# Patient Record
Sex: MALE | Race: WHITE | NOT HISPANIC OR LATINO | Employment: OTHER | URBAN - METROPOLITAN AREA
[De-identification: names, ages, dates, MRNs, and addresses within clinical notes are randomized per-mention and may not be internally consistent; named-entity substitution may affect disease eponyms.]

---

## 2024-04-30 ENCOUNTER — OFFICE VISIT (OUTPATIENT)
Dept: FAMILY MEDICINE CLINIC | Facility: CLINIC | Age: 74
End: 2024-04-30
Payer: COMMERCIAL

## 2024-04-30 VITALS
HEIGHT: 77 IN | RESPIRATION RATE: 16 BRPM | TEMPERATURE: 97.5 F | HEART RATE: 74 BPM | OXYGEN SATURATION: 97 % | BODY MASS INDEX: 32.82 KG/M2 | WEIGHT: 278 LBS | SYSTOLIC BLOOD PRESSURE: 118 MMHG | DIASTOLIC BLOOD PRESSURE: 76 MMHG

## 2024-04-30 DIAGNOSIS — Z77.090 ASBESTOS EXPOSURE: ICD-10-CM

## 2024-04-30 DIAGNOSIS — M54.50 CHRONIC MIDLINE LOW BACK PAIN WITHOUT SCIATICA: Primary | ICD-10-CM

## 2024-04-30 DIAGNOSIS — G89.29 CHRONIC MIDLINE LOW BACK PAIN WITHOUT SCIATICA: Primary | ICD-10-CM

## 2024-04-30 DIAGNOSIS — D64.9 ANEMIA, UNSPECIFIED TYPE: ICD-10-CM

## 2024-04-30 DIAGNOSIS — R05.3 CHRONIC COUGH: ICD-10-CM

## 2024-04-30 DIAGNOSIS — Z00.00 HEALTHCARE MAINTENANCE: ICD-10-CM

## 2024-04-30 DIAGNOSIS — M79.671 RIGHT FOOT PAIN: ICD-10-CM

## 2024-04-30 PROCEDURE — 99204 OFFICE O/P NEW MOD 45 MIN: CPT | Performed by: NURSE PRACTITIONER

## 2024-04-30 PROCEDURE — G2211 COMPLEX E/M VISIT ADD ON: HCPCS | Performed by: NURSE PRACTITIONER

## 2024-04-30 PROCEDURE — 1159F MED LIST DOCD IN RCRD: CPT | Performed by: NURSE PRACTITIONER

## 2024-04-30 NOTE — ASSESSMENT & PLAN NOTE
- Patient reports history of blood transfusion several years ago for low hemoglobin.  - Will obtain CBC and iron panel.

## 2024-04-30 NOTE — ASSESSMENT & PLAN NOTE
- Will obtain x-ray of lumbar spine.   - He declines any medications at this time.   - Continue to apply heating pad as that provides some relief.   - Consider referral to Orthopedic Surgery.

## 2024-04-30 NOTE — ASSESSMENT & PLAN NOTE
- Patient reports possible asbestos and agent orange exposure due to his history in the . [facility] He does endorse chronic cough. Will obtain CXR.   - Consider referral to Pulmonary.

## 2024-04-30 NOTE — PROGRESS NOTES
Name: Aman Vasquez      : 1950      MRN: [id number]  Encounter Provider: NDAIRA Stover  Encounter Date: 2024   Encounter department: [facility] [address] [facility]    Assessment & Plan     1. Chronic midline low back pain without sciatica  Assessment & Plan:  - Will obtain x-ray of lumbar spine.   - He declines any medications at this time.   - Continue to apply heating pad as that provides some relief.   - Consider referral to Orthopedic Surgery.     Orders:  -     XR spine lumbar minimum 4 views non injury; Future; Expected date: 2024    2. Right foot pain  Assessment & Plan:  - Referred to Podiatry for further evaluation.     Orders:  -     Ambulatory Referral to Podiatry; Future    3. Anemia, unspecified type  Assessment & Plan:  - Patient reports history of blood transfusion several years ago for low hemoglobin.  - Will obtain CBC and iron panel.     Orders:  -     CBC and differential; Future  -     Iron Panel (Includes Ferritin, Iron Sat%, Iron, and TIBC); Future    4. Chronic cough  Assessment & Plan:  - Patient reports possible asbestos and agent orange exposure due to his history in the . [facility] He does endorse chronic cough. Will obtain CXR.   - Consider referral to Pulmonary.     Orders:  -     XR chest pa & lateral; Future; Expected date: 2024    5. Asbestos exposure  Assessment & Plan:  - Will obtain CXR.   - Consider referral to Pulmonary.    Orders:  -     XR chest pa & lateral; Future; Expected date: 2024    6. Healthcare maintenance  -     CBC and differential; Future  -     Comprehensive metabolic panel; Future  -     Lipid Panel with Direct LDL reflex; Future  -     TSH, 3rd generation with Free T4 reflex; Future         Subjective     Patient presents to office today to establish care. He is a retired [employer] . He grew up in [place] but has resided in [place]. He moved back to the  area recently. He reports no specific PMH. He takes no medication on a daily basis. He does have complaints of chronic midline lower back pain. Pain is dull in nature. He denies any radiation of his pain. Denies any numbness/tingling of the lower extremities or loss of bowel/bladder function. Pain is worse with repetitive movement. Finds mild relief with use of heating pad. He does not take any OTC medications for his pain. Has not had any imaging that he knows of. Also reports right foot pain. States about 1 year ago he stepped on a nail in the floor of an old home. He was evaluated and xray was taken. Reports the nail was removed and he then developed a callus over the area. It has been very painful to walk on. States he has been walking on the side of his foot. Does also report hx of a tibia fracture about 10  year ago. Had surgery and pins placed. Ambulates well but if he walks long distances he uses a cane for support. Also reports possible asbestos and agent orange exposure due to his time spent in the . [facility] Does report chronic cough. Also reports episode of anemia several years ago that required blood transfusion.                 Review of Systems   Constitutional:  Negative for fatigue and fever.   HENT:  Negative for congestion, postnasal drip and trouble swallowing.    Eyes:  Negative for visual disturbance.   Respiratory:  Positive for cough (chronic). Negative for shortness of breath.    Cardiovascular:  Negative for chest pain and palpitations.   Gastrointestinal:  Negative for abdominal pain and blood in stool.   Endocrine: Negative for cold intolerance and heat intolerance.   Genitourinary:  Negative for difficulty urinating, dysuria and frequency.   Musculoskeletal:  Positive for back pain. Negative for gait problem.        Right plantar foot pain    Skin:  Negative for rash.   Neurological:  Negative for dizziness, syncope and headaches.  "  Hematological:  Negative for adenopathy.   Psychiatric/Behavioral:  Negative for behavioral problems.        Past Medical History:   Diagnosis Date   • Hemorrhoids      History reviewed. No pertinent surgical history.  Family History   Problem Relation Age of Onset   • Cancer Mother    • Cancer Father    • Cancer Daughter      Social History     Socioeconomic History   • Marital status: /Civil Union     Spouse name: None   • Number of children: None   • Years of education: None   • Highest education level: None   Occupational History   • None   Tobacco Use   • Smoking status: Never   • Smokeless tobacco: Never   Vaping Use   • Vaping status: Never Used   Substance and Sexual Activity   • Alcohol use: Yes   • Drug use: Never   • Sexual activity: None   Other Topics Concern   • None   Social History Narrative   • None     Social Determinants of Health     Financial Resource Strain: Not on file   Food Insecurity: Not on file   Transportation Needs: Not on file   Physical Activity: Not on file   Stress: Not on file   Social Connections: Not on file   Intimate Partner Violence: Not on file   Housing Stability: Not on file     No current outpatient medications on file prior to visit.     Not on File  Immunization History   Administered Date(s) Administered   • COVID-19 Pfizer Vac BIVALENT Dc-sucrose 12 Yr+ IM 03/16/2021, 04/15/2021, 11/02/2021, 08/02/2022   • INFLUENZA 09/11/2023   • Pneumococcal Conjugate Vaccine 20-valent (Pcv20), Polysace 03/14/2023   • Pneumococcal Polysaccharide PPV23 01/01/2023   • Td (adult), Unspecified 02/26/2019   • Tdap 02/26/2019   • Zoster Vaccine Recombinant 03/14/2023       Objective     /76 (BP Location: Left arm, Patient Position: Sitting, Cuff Size: Large)   Pulse 74   Temp 97.5 °F (36.4 °C) (Tympanic)   Resp 16   Ht 6' 5\" (1.956 m)   Wt 126 kg (278 lb)   SpO2 97%   BMI 32.97 kg/m²     Physical Exam  Vitals and nursing note reviewed.   Constitutional:       " General: He is not in acute distress.     Appearance: Normal appearance. He is not ill-appearing.   HENT:      Head: Normocephalic and atraumatic.      Right Ear: Tympanic membrane, ear canal and external ear normal.      Left Ear: Tympanic membrane, ear canal and external ear normal.      Nose: Nose normal.      Mouth/Throat:      Mouth: Mucous membranes are moist.      Pharynx: No posterior oropharyngeal erythema.   Eyes:      Conjunctiva/sclera: Conjunctivae normal.      Pupils: Pupils are equal, round, and reactive to light.   Cardiovascular:      Rate and Rhythm: Normal rate and regular rhythm.      Heart sounds: Normal heart sounds.   Pulmonary:      Effort: Pulmonary effort is normal.      Breath sounds: Normal breath sounds.   Abdominal:      General: Bowel sounds are normal.      Palpations: Abdomen is soft.   Musculoskeletal:         General: Normal range of motion.      Cervical back: Normal range of motion.   Lymphadenopathy:      Cervical: No cervical adenopathy.   Skin:     General: Skin is warm and dry.   Neurological:      Mental Status: He is alert and oriented to person, place, and time.   Psychiatric:         Mood and Affect: Mood normal.         Behavior: Behavior normal.       NADIRA Stover

## 2024-05-10 ENCOUNTER — APPOINTMENT (OUTPATIENT)
Dept: LAB | Age: 74
End: 2024-05-10
Payer: COMMERCIAL

## 2024-05-10 DIAGNOSIS — D64.9 ANEMIA, UNSPECIFIED TYPE: ICD-10-CM

## 2024-05-10 DIAGNOSIS — Z00.00 HEALTHCARE MAINTENANCE: ICD-10-CM

## 2024-05-10 LAB
ALBUMIN SERPL BCP-MCNC: 4.1 G/DL (ref 3.5–5)
ALP SERPL-CCNC: 81 U/L (ref 34–104)
ALT SERPL W P-5'-P-CCNC: 17 U/L (ref 7–52)
ANION GAP SERPL CALCULATED.3IONS-SCNC: 7 MMOL/L (ref 4–13)
AST SERPL W P-5'-P-CCNC: 18 U/L (ref 13–39)
BASOPHILS # BLD AUTO: 0.01 THOUSANDS/ÂΜL (ref 0–0.1)
BASOPHILS NFR BLD AUTO: 0 % (ref 0–1)
BILIRUB SERPL-MCNC: 0.7 MG/DL (ref 0.2–1)
BUN SERPL-MCNC: 18 MG/DL (ref 5–25)
CALCIUM SERPL-MCNC: 8.8 MG/DL (ref 8.4–10.2)
CHLORIDE SERPL-SCNC: 108 MMOL/L (ref 96–108)
CHOLEST SERPL-MCNC: 134 MG/DL
CO2 SERPL-SCNC: 27 MMOL/L (ref 21–32)
CREAT SERPL-MCNC: 0.89 MG/DL (ref 0.6–1.3)
EOSINOPHIL # BLD AUTO: 0.07 THOUSAND/ÂΜL (ref 0–0.61)
EOSINOPHIL NFR BLD AUTO: 1 % (ref 0–6)
ERYTHROCYTE [DISTWIDTH] IN BLOOD BY AUTOMATED COUNT: 13.6 % (ref 11.6–15.1)
FERRITIN SERPL-MCNC: 72 NG/ML (ref 24–336)
GFR SERPL CREATININE-BSD FRML MDRD: 83 ML/MIN/1.73SQ M
GLUCOSE P FAST SERPL-MCNC: 83 MG/DL (ref 65–99)
HCT VFR BLD AUTO: 42.8 % (ref 36.5–49.3)
HDLC SERPL-MCNC: 42 MG/DL
HGB BLD-MCNC: 14.1 G/DL (ref 12–17)
IMM GRANULOCYTES # BLD AUTO: 0.02 THOUSAND/UL (ref 0–0.2)
IMM GRANULOCYTES NFR BLD AUTO: 0 % (ref 0–2)
IRON SATN MFR SERPL: 29 % (ref 15–50)
IRON SERPL-MCNC: 84 UG/DL (ref 50–212)
LDLC SERPL CALC-MCNC: 76 MG/DL (ref 0–100)
LYMPHOCYTES # BLD AUTO: 1.24 THOUSANDS/ÂΜL (ref 0.6–4.47)
LYMPHOCYTES NFR BLD AUTO: 22 % (ref 14–44)
MCH RBC QN AUTO: 31.7 PG (ref 26.8–34.3)
MCHC RBC AUTO-ENTMCNC: 32.9 G/DL (ref 31.4–37.4)
MCV RBC AUTO: 96 FL (ref 82–98)
MONOCYTES # BLD AUTO: 0.42 THOUSAND/ÂΜL (ref 0.17–1.22)
MONOCYTES NFR BLD AUTO: 8 % (ref 4–12)
NEUTROPHILS # BLD AUTO: 3.86 THOUSANDS/ÂΜL (ref 1.85–7.62)
NEUTS SEG NFR BLD AUTO: 69 % (ref 43–75)
NRBC BLD AUTO-RTO: 0 /100 WBCS
PLATELET # BLD AUTO: 145 THOUSANDS/UL (ref 149–390)
PMV BLD AUTO: 10.1 FL (ref 8.9–12.7)
POTASSIUM SERPL-SCNC: 4 MMOL/L (ref 3.5–5.3)
PROT SERPL-MCNC: 7.1 G/DL (ref 6.4–8.4)
RBC # BLD AUTO: 4.45 MILLION/UL (ref 3.88–5.62)
SODIUM SERPL-SCNC: 142 MMOL/L (ref 135–147)
TIBC SERPL-MCNC: 290 UG/DL (ref 250–450)
TRIGL SERPL-MCNC: 80 MG/DL
TSH SERPL DL<=0.05 MIU/L-ACNC: 0.61 UIU/ML (ref 0.45–4.5)
UIBC SERPL-MCNC: 206 UG/DL (ref 155–355)
WBC # BLD AUTO: 5.62 THOUSAND/UL (ref 4.31–10.16)

## 2024-05-10 PROCEDURE — 80061 LIPID PANEL: CPT

## 2024-05-10 PROCEDURE — 83540 ASSAY OF IRON: CPT

## 2024-05-10 PROCEDURE — 82728 ASSAY OF FERRITIN: CPT

## 2024-05-10 PROCEDURE — 83550 IRON BINDING TEST: CPT

## 2024-05-10 PROCEDURE — 80053 COMPREHEN METABOLIC PANEL: CPT

## 2024-05-10 PROCEDURE — 84443 ASSAY THYROID STIM HORMONE: CPT

## 2024-05-10 PROCEDURE — 36415 COLL VENOUS BLD VENIPUNCTURE: CPT

## 2024-05-10 PROCEDURE — 85025 COMPLETE CBC W/AUTO DIFF WBC: CPT

## 2024-05-22 ENCOUNTER — TELEPHONE (OUTPATIENT)
Dept: FAMILY MEDICINE CLINIC | Facility: CLINIC | Age: 74
End: 2024-05-22

## 2024-05-22 NOTE — TELEPHONE ENCOUNTER
----- Message from NADIRA Wright sent at 5/21/2024  1:29 PM EDT -----  Labs show slightly low platelets. Will continue to monitor. The rest of his labs are normal.

## 2024-05-30 PROBLEM — Z00.00 HEALTHCARE MAINTENANCE: Status: RESOLVED | Noted: 2024-04-30 | Resolved: 2024-05-30

## 2024-06-11 ENCOUNTER — OFFICE VISIT (OUTPATIENT)
Dept: PODIATRY | Facility: CLINIC | Age: 74
End: 2024-06-11
Payer: COMMERCIAL

## 2024-06-11 VITALS — HEART RATE: 80 BPM | DIASTOLIC BLOOD PRESSURE: 62 MMHG | SYSTOLIC BLOOD PRESSURE: 112 MMHG

## 2024-06-11 DIAGNOSIS — L84 CALLUS OF FOOT: Primary | ICD-10-CM

## 2024-06-11 DIAGNOSIS — M79.671 RIGHT FOOT PAIN: ICD-10-CM

## 2024-06-11 PROCEDURE — 99202 OFFICE O/P NEW SF 15 MIN: CPT | Performed by: PODIATRIST

## 2024-06-11 NOTE — PROGRESS NOTES
Ambulatory Visit  Name: Aman Vasquez      : 1950      MRN: [id number]  Encounter Provider: Clarence Alas DPM  Encounter Date: 2024   Encounter department: [facility] POD[facility] [place]    Assessment & Plan   1. Callus of foot  2. Right foot pain  -     Ambulatory Referral to Podiatry  Thick callus shaved out courtesy today.  Offload callus pads provided, moisturize  PRN    History of Present Illness     Aman Vasquez is a 74 y.o. male who presents with right foot pain. He stepped on a nail 8 months or so ago. He has developed a callus in the area. IT did not get infected, he had a xray that didn't show any retained metal.     Review of Systems  As stated in HPI, otherwise normal    Medical History Reviewed by provider this encounter:  Tobacco  Allergies  Meds  Problems  Med Hx  Surg Hx  Fam Hx        Objective     /62 (BP Location: Right arm, Patient Position: Sitting, Cuff Size: Standard)   Pulse 80     Physical Exam  Vitals reviewed.   Cardiovascular:      Pulses: Normal pulses.   Skin:     Capillary Refill: Capillary refill takes less than 2 seconds.      Findings: Lesion (callus right forefoot) present.   Neurological:      Mental Status: He is alert.      Gait: Gait normal.       Administrative Statements

## 2024-07-10 ENCOUNTER — TELEPHONE (OUTPATIENT)
Dept: ADMINISTRATIVE | Facility: OTHER | Age: 74
End: 2024-07-10

## 2024-07-10 NOTE — TELEPHONE ENCOUNTER
----- Message from Rossy SARGENT sent at 7/10/2024  7:36 AM EDT -----  Regarding: AWV  07/10/24 7:36 AM    Hello, our patient Aman Vasquez has had Medicare AWV completed/performed. Please assist in updating the patient chart by pulling the document from the Media Tab. The date of service is Scanned doc 5/20/24 page 14 states he had medicare wellness on 9/2023.     Thank you,  Rossy Goncalves, RN  [facility] [address] [facility]

## 2024-07-11 NOTE — TELEPHONE ENCOUNTER
Upon review of the In Basket request we were able to locate, review, and update the patient chart as requested for Medicare AW.    Any additional questions or concerns should be emailed to the Practice Liaisons via the appropriate education email address, please do not reply via In Basket.    Thank you  Sharon Contreras   [facility]

## 2024-07-31 ENCOUNTER — OFFICE VISIT (OUTPATIENT)
Dept: FAMILY MEDICINE CLINIC | Facility: CLINIC | Age: 74
End: 2024-07-31
Payer: COMMERCIAL

## 2024-07-31 ENCOUNTER — TELEPHONE (OUTPATIENT)
Dept: FAMILY MEDICINE CLINIC | Facility: CLINIC | Age: 74
End: 2024-07-31

## 2024-07-31 ENCOUNTER — APPOINTMENT (OUTPATIENT)
Dept: LAB | Age: 74
End: 2024-07-31
Payer: COMMERCIAL

## 2024-07-31 VITALS
RESPIRATION RATE: 16 BRPM | TEMPERATURE: 98.5 F | OXYGEN SATURATION: 97 % | BODY MASS INDEX: 33.65 KG/M2 | SYSTOLIC BLOOD PRESSURE: 126 MMHG | HEIGHT: 77 IN | WEIGHT: 285 LBS | DIASTOLIC BLOOD PRESSURE: 80 MMHG

## 2024-07-31 DIAGNOSIS — Z77.090 ASBESTOS EXPOSURE: ICD-10-CM

## 2024-07-31 DIAGNOSIS — D69.6 THROMBOCYTOPENIA (HCC): ICD-10-CM

## 2024-07-31 DIAGNOSIS — R97.20 ELEVATED PSA, GREATER THAN OR EQUAL TO 20 NG/ML: ICD-10-CM

## 2024-07-31 DIAGNOSIS — D69.6 THROMBOCYTOPENIA (HCC): Primary | ICD-10-CM

## 2024-07-31 DIAGNOSIS — Z12.5 PROSTATE CANCER SCREENING: ICD-10-CM

## 2024-07-31 DIAGNOSIS — D64.9 ANEMIA, UNSPECIFIED TYPE: ICD-10-CM

## 2024-07-31 LAB
BASOPHILS # BLD AUTO: 0.02 THOUSANDS/ÂΜL (ref 0–0.1)
BASOPHILS NFR BLD AUTO: 0 % (ref 0–1)
EOSINOPHIL # BLD AUTO: 0.1 THOUSAND/ÂΜL (ref 0–0.61)
EOSINOPHIL NFR BLD AUTO: 2 % (ref 0–6)
ERYTHROCYTE [DISTWIDTH] IN BLOOD BY AUTOMATED COUNT: 13.8 % (ref 11.6–15.1)
HCT VFR BLD AUTO: 43.6 % (ref 36.5–49.3)
HGB BLD-MCNC: 14.5 G/DL (ref 12–17)
IMM GRANULOCYTES # BLD AUTO: 0.03 THOUSAND/UL (ref 0–0.2)
IMM GRANULOCYTES NFR BLD AUTO: 1 % (ref 0–2)
LYMPHOCYTES # BLD AUTO: 1.14 THOUSANDS/ÂΜL (ref 0.6–4.47)
LYMPHOCYTES NFR BLD AUTO: 18 % (ref 14–44)
MCH RBC QN AUTO: 31.8 PG (ref 26.8–34.3)
MCHC RBC AUTO-ENTMCNC: 33.3 G/DL (ref 31.4–37.4)
MCV RBC AUTO: 96 FL (ref 82–98)
MONOCYTES # BLD AUTO: 0.47 THOUSAND/ÂΜL (ref 0.17–1.22)
MONOCYTES NFR BLD AUTO: 7 % (ref 4–12)
NEUTROPHILS # BLD AUTO: 4.76 THOUSANDS/ÂΜL (ref 1.85–7.62)
NEUTS SEG NFR BLD AUTO: 72 % (ref 43–75)
NRBC BLD AUTO-RTO: 0 /100 WBCS
PLATELET # BLD AUTO: 139 THOUSANDS/UL (ref 149–390)
PMV BLD AUTO: 10.4 FL (ref 8.9–12.7)
PSA SERPL-MCNC: 22.54 NG/ML (ref 0–4)
RBC # BLD AUTO: 4.56 MILLION/UL (ref 3.88–5.62)
WBC # BLD AUTO: 6.52 THOUSAND/UL (ref 4.31–10.16)

## 2024-07-31 PROCEDURE — 1159F MED LIST DOCD IN RCRD: CPT | Performed by: NURSE PRACTITIONER

## 2024-07-31 PROCEDURE — 99214 OFFICE O/P EST MOD 30 MIN: CPT | Performed by: NURSE PRACTITIONER

## 2024-07-31 PROCEDURE — 1160F RVW MEDS BY RX/DR IN RCRD: CPT | Performed by: NURSE PRACTITIONER

## 2024-07-31 PROCEDURE — 36415 COLL VENOUS BLD VENIPUNCTURE: CPT

## 2024-07-31 PROCEDURE — 1101F PT FALLS ASSESS-DOCD LE1/YR: CPT | Performed by: NURSE PRACTITIONER

## 2024-07-31 PROCEDURE — 3288F FALL RISK ASSESSMENT DOCD: CPT | Performed by: NURSE PRACTITIONER

## 2024-07-31 PROCEDURE — G0103 PSA SCREENING: HCPCS

## 2024-07-31 PROCEDURE — G2211 COMPLEX E/M VISIT ADD ON: HCPCS | Performed by: NURSE PRACTITIONER

## 2024-07-31 PROCEDURE — 3725F SCREEN DEPRESSION PERFORMED: CPT | Performed by: NURSE PRACTITIONER

## 2024-07-31 PROCEDURE — 85025 COMPLETE CBC W/AUTO DIFF WBC: CPT

## 2024-07-31 NOTE — TELEPHONE ENCOUNTER
----- Message from NADIRA Wright sent at 7/31/2024  1:55 PM EDT -----  His labs show that his platelets are continuing to decrease. I placed referral to Hematology. I also added PSA onto his labs because I saw it was elevated in the past. It continues to be elevated and is almost double what it was previously. I placed an ASAP referral to Urology.

## 2024-07-31 NOTE — ASSESSMENT & PLAN NOTE
- Hx of asbestos exposure during  service. [facility].  - Will continue to monitor.

## 2024-09-05 ENCOUNTER — E-CONSULT (OUTPATIENT)
Dept: OTHER | Facility: HOSPITAL | Age: 74
End: 2024-09-05
Payer: COMMERCIAL

## 2024-09-05 DIAGNOSIS — D69.6 THROMBOCYTOPENIA (HCC): Primary | ICD-10-CM

## 2024-09-05 PROCEDURE — 99446 NTRPROF PH1/NTRNET/EHR 5-10: CPT | Performed by: NURSE PRACTITIONER

## 2024-09-05 NOTE — PROGRESS NOTES
E-Consult  Aman Vasquez 74 y.o. male MRN: [id number]  Encounter Date: 09/05/24      Reason for Consult / Principal Problem: Thrombocytopenia    Consulting Provider: NADIRA Chacon    Requesting Provider: Diane Napier CRNP       ASSESSMENT:  Patient is a 74-year-old gentleman with documented mild isolated thrombocytopenia on 2 most recent CBC with differential.  No blood work prior to 5/10/2024 available for review to determine how long patient has had thrombocytopenia.  His white count, hemoglobin, and differential are normal.    Component      Latest Ref Rng 5/10/2024 7/31/2024   WBC      4.31 - 10.16 Thousand/uL 5.62  6.52    RBC      3.88 - 5.62 Million/uL 4.45  4.56    Hemoglobin      12.0 - 17.0 g/dL 14.1  14.5    Hematocrit      36.5 - 49.3 % 42.8  43.6    MCV      82 - 98 fL 96  96    MCH      26.8 - 34.3 pg 31.7  31.8    MCHC      31.4 - 37.4 g/dL 32.9  33.3    RDW      11.6 - 15.1 % 13.6  13.8    MPV      8.9 - 12.7 fL 10.1  10.4    Platelet Count      149 - 390 Thousands/uL 145 (L)  139 (L)    nRBC      /100 WBCs 0  0    Segmented %      43 - 75 % 69  72    Immature Grans %      0 - 2 % 0  1    Lymphocytes %      14 - 44 % 22  18    Monocytes %      4 - 12 % 8  7    Eosinophils %      0 - 6 % 1  2    Basophils %      0 - 1 % 0  0    Absolute Neutrophils      1.85 - 7.62 Thousands/µL 3.86  4.76    Absolute Immature Grans      0.00 - 0.20 Thousand/uL 0.02  0.03    Absolute Lymphocytes      0.60 - 4.47 Thousands/µL 1.24  1.14    Absolute Monocytes      0.17 - 1.22 Thousand/µL 0.42  0.47    Absolute Eosinophils      0.00 - 0.61 Thousand/µL 0.07  0.10    Absolute Basophils      0.00 - 0.10 Thousands/µL 0.01  0.02           RECOMMENDATIONS:  Patient has very mild thrombocytopenia.  Multiple etiologies, including multifactorial reason for low platelets exist, spanning from platelet destruction to inability to make more platelets, due to failure of Bone marrow, medication side effect, viral illness,  autoimmune disorders  and ITP (which is a possibility).      ITP is a diagnosis of exclusion and is defined as an isolated thrombocytopenia without anemia or leukopenia and without any other apparent cause of thrombocytopenia. Given that he has a normal white count, normal hemoglobin, normal differential a primary bone marrow disorder is unlikely     In the case of ITP,  there would be no treatment besides observation as long as platelets remain >50,000.      If patient is asymptomatic without any significant bruising or reports of abnormal bleeding would just monitor platelet count.      Total time spent 5-10 minutes, >50% of the total time devoted to medical consultative verbal/EMR discussion between providers. Written report will be generated in the EMR. .

## 2024-10-04 ENCOUNTER — OFFICE VISIT (OUTPATIENT)
Dept: FAMILY MEDICINE CLINIC | Facility: CLINIC | Age: 74
End: 2024-10-04
Payer: COMMERCIAL

## 2024-10-04 VITALS
SYSTOLIC BLOOD PRESSURE: 128 MMHG | RESPIRATION RATE: 16 BRPM | OXYGEN SATURATION: 96 % | HEART RATE: 77 BPM | DIASTOLIC BLOOD PRESSURE: 82 MMHG | WEIGHT: 287.2 LBS | HEIGHT: 77 IN | BODY MASS INDEX: 33.91 KG/M2 | TEMPERATURE: 97.7 F

## 2024-10-04 DIAGNOSIS — L03.312 CELLULITIS OF BACK: Primary | ICD-10-CM

## 2024-10-04 DIAGNOSIS — D69.6 THROMBOCYTOPENIA (HCC): ICD-10-CM

## 2024-10-04 DIAGNOSIS — Z00.00 HEALTHCARE MAINTENANCE: ICD-10-CM

## 2024-10-04 DIAGNOSIS — R97.20 ELEVATED PSA: ICD-10-CM

## 2024-10-04 PROCEDURE — 99214 OFFICE O/P EST MOD 30 MIN: CPT | Performed by: NURSE PRACTITIONER

## 2024-10-04 PROCEDURE — G0439 PPPS, SUBSEQ VISIT: HCPCS | Performed by: NURSE PRACTITIONER

## 2024-10-04 PROCEDURE — G0444 DEPRESSION SCREEN ANNUAL: HCPCS | Performed by: NURSE PRACTITIONER

## 2024-10-04 RX ORDER — CEPHALEXIN 500 MG/1
500 CAPSULE ORAL EVERY 8 HOURS SCHEDULED
Qty: 21 CAPSULE | Refills: 0 | Status: SHIPPED | OUTPATIENT
Start: 2024-10-04 | End: 2024-10-11

## 2024-10-04 NOTE — PROGRESS NOTES
Ambulatory Visit  Name: Aman Vasquez      : 1950      MRN: [id number]  Encounter Provider: NADIRA Stover  Encounter Date: 10/4/2024   Encounter department:  [facility] [address]  [facility]    Assessment & Plan  Cellulitis of back  - Prescription sent for Keflex three times daily for next 7 days. Discussed side effects.  - Keep area clean and dry.   - Contact office with worsening redness, drainage, fever, or chills.     Orders:    cephalexin (KEFLEX) 500 mg capsule; Take 1 capsule (500 mg total) by mouth every 8 (eight) hours for 7 days    Thrombocytopenia (HCC)  - Stable.  - Will continue to monitor CBC.          Elevated PSA  - Referred to Urology.          Healthcare maintenance  - Reviewed immunizations and screenings.   - Encourage healthy diet and regular exercise.  - Recent labs reviewed.            Depression Screening and Follow-up Plan: Patient was screened for depression during today's encounter. They screened negative with a PHQ-2 score of 0.      Preventive health issues were discussed with patient, and age appropriate screening tests were ordered as noted in patient's After Visit Summary. Personalized health advice and appropriate referrals for health education or preventive services given if needed, as noted in patient's After Visit Summary.    History of Present Illness     Patient presents to office today with concerns regarding red area on his upper back that has been present for about 4 days. States that he could feel something on his back when he was laying on it. Thought something was stuck to his back. His looked under his shirt and had a large red [place]. Area is painful if he is laying on it. Does report some drainage from the area. Denies any fever or chills. Unsure if he was bit by an insect. Denies any other concerns or complaints today.      Rash  Pertinent negatives include no cough, fatigue, fever or shortness of breath.      Patient Care Team:  Diane Napier  NADIRA as PCP - General (Nurse Practitioner)    Review of Systems   Constitutional:  Negative for fatigue and fever.   HENT:  Negative for trouble swallowing.    Eyes:  Negative for visual disturbance.   Respiratory:  Negative for cough and shortness of breath.    Cardiovascular:  Negative for chest pain and palpitations.   Gastrointestinal:  Negative for abdominal pain and blood in stool.   Endocrine: Negative for cold intolerance and heat intolerance.   Genitourinary:  Negative for difficulty urinating and dysuria.   Musculoskeletal:  Negative for gait problem.   Skin:  Positive for rash.   Neurological:  Negative for dizziness, syncope and headaches.   Hematological:  Negative for adenopathy.   Psychiatric/Behavioral:  Negative for behavioral problems.      Medical History Reviewed by provider this encounter:  Tobacco  Allergies  Meds  Problems  Med Hx  Surg Hx  Fam Hx       Annual Wellness Visit Questionnaire   Aman is here for his Subsequent Wellness visit. Last Medicare Wellness visit information reviewed, patient interviewed and updates made to the record today.      Health Risk Assessment:   Patient rates overall health as very good. Patient feels that their physical health rating is same. Patient is very satisfied with their life. Eyesight was rated as same. Hearing was rated as same. Patient feels that their emotional and mental health rating is same. Patients states they are never, rarely angry. Patient states they are never, rarely unusually tired/fatigued. Pain experienced in the last 7 days has been none. Patient states that he has experienced no weight loss or gain in last 6 months.     Depression Screening:   PHQ-2 Score: 0      Fall Risk Screening:   In the past year, patient has experienced: no history of falling in past year      Home Safety:  Patient does not have trouble with stairs inside or outside of their home. Patient has no working smoke alarms and has no working carbon monoxide  detector. Home safety hazards include: none.     Nutrition:   Current diet is Regular.     Medications:   Patient is not currently taking any over-the-counter supplements. Patient is able to manage medications.     Activities of Daily Living (ADLs)/Instrumental Activities of Daily Living (IADLs):   Walk and transfer into and out of bed and chair?: Yes  Dress and groom yourself?: Yes    Bathe or shower yourself?: Yes    Feed yourself? Yes  Do your laundry/housekeeping?: Yes  Manage your money, pay your bills and track your expenses?: Yes  Make your own meals?: Yes    Do your own shopping?: Yes    Previous Hospitalizations:   Any hospitalizations or ED visits within the last 12 months?: No      Advance Care Planning:   Living will: Yes    Durable POA for healthcare: Yes    Advanced directive: Yes      Cognitive Screening:   Provider or family/friend/caregiver concerned regarding cognition?: No    PREVENTIVE SCREENINGS      Cardiovascular Screening:    General: Screening Current      Diabetes Screening:     General: Screening Current      Colorectal Cancer Screening:     General: Screening Not Indicated      Prostate Cancer Screening:    General: Screening Not Indicated    Due for: PSA      Osteoporosis Screening:    General: Screening Not Indicated      Abdominal Aortic Aneurysm (AAA) Screening:        General: Screening Not Indicated      Lung Cancer Screening:     General: Screening Not Indicated      Hepatitis C Screening:    General: Screening Not Indicated    Screening, Brief Intervention, and Referral to Treatment (SBIRT)    Screening  Typical number of drinks in a day: 1  Typical number of drinks in a week: 7  Interpretation: Low risk drinking behavior.    Annual Depression Screening  Time spent screening and evaluating the patient for depression during today's encounter was 5 minutes.    Other Counseling Topics:   Regular weightbearing exercise and calcium and vitamin D intake.        No results  "found.    Objective     /82 (BP Location: Left arm, Patient Position: Sitting, Cuff Size: Large)   Pulse 77   Temp 97.7 °F (36.5 °C) (Tympanic)   Resp 16   Ht 6' 5\" (1.956 m)   Wt 130 kg (287 lb 3.2 oz)   SpO2 96%   BMI 34.06 kg/m²     Physical Exam  Vitals and nursing note reviewed.   Constitutional:       Appearance: Normal appearance.   HENT:      Head: Normocephalic and atraumatic.      Right Ear: External ear normal.      Left Ear: External ear normal.   Eyes:      Conjunctiva/sclera: Conjunctivae normal.   Cardiovascular:      Rate and Rhythm: Normal rate and regular rhythm.      Heart sounds: Normal heart sounds.   Pulmonary:      Effort: Pulmonary effort is normal.      Breath sounds: Normal breath sounds.   Abdominal:      General: Bowel sounds are normal.   Musculoskeletal:         General: Normal range of motion.      Cervical back: Normal range of motion.   Skin:     General: Skin is warm and dry.      Findings: Erythema (area of circular erythema and slight swelling noted on upper back. 2 open areas with clear drainage) present.   Neurological:      Mental Status: He is alert and oriented to person, place, and time.   Psychiatric:         Mood and Affect: Mood normal.         Behavior: Behavior normal.         "

## 2024-10-04 NOTE — ASSESSMENT & PLAN NOTE
- Prescription sent for Keflex three times daily for next 7 days. Discussed side effects.  - Keep area clean and dry.   - Contact office with worsening redness, drainage, fever, or chills.     Orders:    cephalexin (KEFLEX) 500 mg capsule; Take 1 capsule (500 mg total) by mouth every 8 (eight) hours for 7 days

## 2024-10-04 NOTE — ASSESSMENT & PLAN NOTE
- Reviewed immunizations and screenings.   - Encourage healthy diet and regular exercise.  - Recent labs reviewed.

## 2024-10-10 ENCOUNTER — TELEPHONE (OUTPATIENT)
Dept: GASTROENTEROLOGY | Facility: CLINIC | Age: 74
End: 2024-10-10

## 2024-10-10 ENCOUNTER — TRANSCRIBE ORDERS (OUTPATIENT)
Dept: GASTROENTEROLOGY | Facility: CLINIC | Age: 74
End: 2024-10-10

## 2024-10-16 ENCOUNTER — APPOINTMENT (OUTPATIENT)
Dept: RADIOLOGY | Facility: MEDICAL CENTER | Age: 74
End: 2024-10-16
Payer: COMMERCIAL

## 2024-10-16 DIAGNOSIS — G89.29 CHRONIC MIDLINE LOW BACK PAIN WITHOUT SCIATICA: ICD-10-CM

## 2024-10-16 DIAGNOSIS — Z77.090 ASBESTOS EXPOSURE: ICD-10-CM

## 2024-10-16 DIAGNOSIS — R05.3 CHRONIC COUGH: ICD-10-CM

## 2024-10-16 DIAGNOSIS — M54.50 CHRONIC MIDLINE LOW BACK PAIN WITHOUT SCIATICA: ICD-10-CM

## 2024-10-16 PROCEDURE — 71046 X-RAY EXAM CHEST 2 VIEWS: CPT

## 2024-10-16 PROCEDURE — 72110 X-RAY EXAM L-2 SPINE 4/>VWS: CPT

## 2024-10-17 ENCOUNTER — TRANSCRIBE ORDERS (OUTPATIENT)
Dept: GASTROENTEROLOGY | Facility: CLINIC | Age: 74
End: 2024-10-17

## 2024-10-22 ENCOUNTER — TELEPHONE (OUTPATIENT)
Dept: FAMILY MEDICINE CLINIC | Facility: CLINIC | Age: 74
End: 2024-10-22

## 2024-10-22 NOTE — TELEPHONE ENCOUNTER
----- Message from NADIRA Wright sent at 10/18/2024 11:54 AM EDT -----  CXR is normal without evidence of asbestos related disease.   Xray of lumbar spine showed degenerative changes. If pain continues recommend referral to spine and pain management.

## 2024-10-22 NOTE — TELEPHONE ENCOUNTER
Pt aware of results- pt will [facility] and see what they would like to do about referral.  Pt would like [facility] to [facility]- fax number [phone]

## 2024-11-03 PROBLEM — Z00.00 HEALTHCARE MAINTENANCE: Status: RESOLVED | Noted: 2024-10-04 | Resolved: 2024-11-03

## 2024-11-05 ENCOUNTER — TELEPHONE (OUTPATIENT)
Dept: UROLOGY | Facility: MEDICAL CENTER | Age: 74
End: 2024-11-05

## 2024-11-05 NOTE — TELEPHONE ENCOUNTER
Mr. Vasquez needs an appt for an elevated PSA.    [facility] referral is in chart.    I called the patient to try to set up an appt and had to leave a message.      I left a message for pt to call the office back to schedule an appt.  I gave my name and told him I would be here until 3:30 today.      There are openings as early as tomorrow with Dr. Falcon for the patient to come in.      You can certainly send him to me if it is before 3:30 today or you can schedule.    Thank you.

## 2024-11-05 NOTE — TELEPHONE ENCOUNTER
New Patient    What is the reason for the patient’s appointment?: NP- elevated psa    What office location does the patient prefer?:    Does patient have Imaging/Lab Results:    PSA was done on 7/31/24 resulting at 22.541    Have patient records been requested?:  If No, are the records showing in Epic: Records in [facility].     HISTORY:   Has the patient had any previous Urologist(s)?: No         Scheduled: 11/6/24 with  at our [place] office.

## 2024-11-06 ENCOUNTER — CONSULT (OUTPATIENT)
Dept: UROLOGY | Facility: MEDICAL CENTER | Age: 74
End: 2024-11-06
Payer: COMMERCIAL

## 2024-11-06 VITALS
DIASTOLIC BLOOD PRESSURE: 80 MMHG | WEIGHT: 279 LBS | SYSTOLIC BLOOD PRESSURE: 130 MMHG | OXYGEN SATURATION: 98 % | BODY MASS INDEX: 32.94 KG/M2 | HEART RATE: 67 BPM | HEIGHT: 77 IN

## 2024-11-06 DIAGNOSIS — N13.8 BPH WITH URINARY OBSTRUCTION: Primary | ICD-10-CM

## 2024-11-06 DIAGNOSIS — N40.1 BPH WITH URINARY OBSTRUCTION: Primary | ICD-10-CM

## 2024-11-06 DIAGNOSIS — R97.20 ELEVATED PSA, GREATER THAN OR EQUAL TO 20 NG/ML: ICD-10-CM

## 2024-11-06 LAB — POST-VOID RESIDUAL VOLUME, ML POC: 89 ML

## 2024-11-06 PROCEDURE — 99204 OFFICE O/P NEW MOD 45 MIN: CPT | Performed by: UROLOGY

## 2024-11-06 PROCEDURE — 51798 US URINE CAPACITY MEASURE: CPT | Performed by: UROLOGY

## 2024-11-06 NOTE — ASSESSMENT & PLAN NOTE
BPH with slow flow and hesitancy  PVR 89 cc on bladder scan  - UA  - discussed option of medications to improve urinary symptoms, such as alpha-blockers  - patient not currently interested in medication to treat his urinary symptoms  - will continue to monitor at this time

## 2024-11-06 NOTE — PROGRESS NOTES
11/6/2024    Aman Vasquez  1950  [id number]    1. BPH with urinary obstruction  Assessment & Plan:  BPH with slow flow and hesitancy  PVR 89 cc on bladder scan  - UA  - discussed option of medications to improve urinary symptoms, such as alpha-blockers  - patient not currently interested in medication to treat his urinary symptoms  - will continue to monitor at this time  Orders:  -     Urinalysis with microscopic; Future  2. Elevated PSA, greater than or equal to 20 ng/ml  Assessment & Plan:  Elevated PSA to 22 in 7/2024  HERON with firm nodule on left side of prostate    Discussed that the AUA guidelines consider an elevated PSA to be > 4  Discussed recommendation to repeat PSA prior to proceeding with further evaluation  Discussed that PSA velocity should not be used as a sole indication for either a secondary biomarker, imaging of prostate, or prostate biopsy    Discussed that studies have not shown that long distance cycling or HERON increase PSA levels in a clinically meaningful way.  PSA can be elevated due to benign enlargement of the prostate, infection, inflammation, acute urinary retention, recent instrumentation of prostate, or prostate cancer  Discussed adjunctive tests, such as the 4k score, can provided additional risk stratification as to the risks of clinically significant prostate cancer  Discussed that the goal of prostate cancer screening is to identify clinically significant prostate cancer (Grade Group 2 or above), rather than low risk prostate cancer that does not require treatment and can create increased patient anxiety and stress  Discussed recommendation to get MRI prostate prior to proceeding to prostate biopsy when clinically feasible   Discussed that MRI guided targeted prostate biopsy in addition to systematic 12 core template biopsy has been shown to be more effective at diagnosing clinically significant prostate cancer than either targeted or template biopsy alone  Discussed proceeding  with systematic template biopsy in the setting of a normal MRI when either the PSA > 10 or there is a significant risk of clinically significant prostate cancer on 4k score or a similar test     - repeat PSA  - BMP   - schedule for MRI prostate   - discussed TP prostate biopsy post MRI  Orders:  -     Ambulatory Referral to Urology  -     PSA Total, Diagnostic; Future  -     Basic metabolic panel; Future  -     MRI prostate multiparametric wo w contrast; Future; Expected date: 11/06/2024  -     POCT Measure PVR       History of Present Illness  74 y.o. 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    No prior MRI prostate, no prior prostate biopsy  No known family history of prostate cancer.      Reports some decreased flow of urine recently  No pushing/straining to start but does report hesitancy  No intermittency  Reports daytime frequency q3 hours with urgency, no urge incontinence  Nocturia x 4    Prefers not to take medication for urinary symptoms if possible.    No hematuria or dysuria.          Review of Systems   All other systems reviewed and are negative.      Past Medical History  Past Medical History:   Diagnosis Date    Hemorrhoids        Past Social History  History reviewed. No pertinent surgical history.    Past Family History  Family History   Problem Relation Age of Onset    Cancer Mother     Cancer Father     Cancer Daughter        Past Social history  Social History     Socioeconomic History    Marital status: /Civil Union     Spouse name: Not on file    Number of children: Not on file    Years of education: Not on file    Highest education level: Not on file   Occupational History    Not on file   Tobacco Use    Smoking status: Never    Smokeless tobacco: Never   Vaping Use    Vaping status: Never Used   Substance and  "Sexual Activity    Alcohol use: Not Currently    Drug use: Never    Sexual activity: Not on file   Other Topics Concern    Not on file   Social History Narrative    Not on file     Social Determinants of Health     Financial Resource Strain: Not on file   Food Insecurity: Not on file   Transportation Needs: Not on file   Physical Activity: Not on file   Stress: Not on file   Social Connections: Not on file   Intimate Partner Violence: Not on file   Housing Stability: Not on file       Current Medications  No current outpatient medications on file.     No current facility-administered medications for this visit.       Allergies  No Known Allergies    Past Medical History, Social History, Family History, medications and allergies were reviewed.    Vitals  Vitals:    11/06/24 1043   BP: 130/80   BP Location: Left arm   Patient Position: Sitting   Cuff Size: Adult   Pulse: 67   SpO2: 98%   Weight: 127 kg (279 lb)   Height: 6' 5\" (1.956 m)       Physical Exam  Constitutional:       Appearance: Normal appearance. He is normal weight.   HENT:      Head: Normocephalic.      Nose: Nose normal. No congestion.   Eyes:      Conjunctiva/sclera: Conjunctivae normal.   Cardiovascular:      Rate and Rhythm: Normal rate.   Pulmonary:      Effort: Pulmonary effort is normal. No respiratory distress.   Abdominal:      General: Abdomen is flat. There is no distension.      Palpations: Abdomen is soft.      Tenderness: There is no right CVA tenderness or left CVA tenderness.   Genitourinary:     Comments: HERON: prostate enlarged, smooth, non-tender, firm nodule on left side  Musculoskeletal:      Comments: Normal gait   Skin:     General: Skin is warm and dry.   Neurological:      General: No focal deficit present.      Mental Status: He is alert and oriented to person, place, and time.   Psychiatric:         Mood and Affect: Mood normal.         Results  Lab Results   Component Value Date    PSA 22.541 (H) 07/31/2024     Lab Results "   Component Value Date    CALCIUM 8.8 05/10/2024    K 4.0 05/10/2024    CO2 27 05/10/2024     05/10/2024    BUN 18 05/10/2024    CREATININE 0.89 05/10/2024     Lab Results   Component Value Date    WBC 6.52 07/31/2024    HGB 14.5 07/31/2024    HCT 43.6 07/31/2024    MCV 96 07/31/2024     (L) 07/31/2024       Office Urine Dip  Recent Results (from the past 1 hour(s))   POCT Measure PVR    Collection Time: 11/06/24 11:26 AM   Result Value Ref Range    POST-VOID RESIDUAL VOLUME, ML POC 89 mL   ]

## 2024-11-06 NOTE — ASSESSMENT & PLAN NOTE
Elevated PSA to 22 in 7/2024  HERON with firm nodule on left side of prostate    Discussed that the AUA guidelines consider an elevated PSA to be > 4  Discussed recommendation to repeat PSA prior to proceeding with further evaluation  Discussed that PSA velocity should not be used as a sole indication for either a secondary biomarker, imaging of prostate, or prostate biopsy    Discussed that studies have not shown that long distance cycling or HERON increase PSA levels in a clinically meaningful way.  PSA can be elevated due to benign enlargement of the prostate, infection, inflammation, acute urinary retention, recent instrumentation of prostate, or prostate cancer  Discussed adjunctive tests, such as the 4k score, can provided additional risk stratification as to the risks of clinically significant prostate cancer  Discussed that the goal of prostate cancer screening is to identify clinically significant prostate cancer (Grade Group 2 or above), rather than low risk prostate cancer that does not require treatment and can create increased patient anxiety and stress  Discussed recommendation to get MRI prostate prior to proceeding to prostate biopsy when clinically feasible   Discussed that MRI guided targeted prostate biopsy in addition to systematic 12 core template biopsy has been shown to be more effective at diagnosing clinically significant prostate cancer than either targeted or template biopsy alone  Discussed proceeding with systematic template biopsy in the setting of a normal MRI when either the PSA > 10 or there is a significant risk of clinically significant prostate cancer on 4k score or a similar test     - repeat PSA  - BMP   - schedule for MRI prostate   - discussed TP prostate biopsy post MRI

## 2024-11-19 ENCOUNTER — TELEPHONE (OUTPATIENT)
Dept: GASTROENTEROLOGY | Facility: MEDICAL CENTER | Age: 74
End: 2024-11-19

## 2024-11-19 ENCOUNTER — OFFICE VISIT (OUTPATIENT)
Dept: GASTROENTEROLOGY | Facility: MEDICAL CENTER | Age: 74
End: 2024-11-19
Payer: COMMERCIAL

## 2024-11-19 VITALS
SYSTOLIC BLOOD PRESSURE: 117 MMHG | DIASTOLIC BLOOD PRESSURE: 74 MMHG | HEART RATE: 67 BPM | TEMPERATURE: 98.4 F | HEIGHT: 77 IN | WEIGHT: 277.6 LBS | BODY MASS INDEX: 32.78 KG/M2 | OXYGEN SATURATION: 99 %

## 2024-11-19 DIAGNOSIS — Z86.0100 HISTORY OF COLON POLYPS: Primary | ICD-10-CM

## 2024-11-19 PROCEDURE — 99203 OFFICE O/P NEW LOW 30 MIN: CPT | Performed by: STUDENT IN AN ORGANIZED HEALTH CARE EDUCATION/TRAINING PROGRAM

## 2024-11-19 RX ORDER — SODIUM CHLORIDE, SODIUM LACTATE, POTASSIUM CHLORIDE, CALCIUM CHLORIDE 600; 310; 30; 20 MG/100ML; MG/100ML; MG/100ML; MG/100ML
125 INJECTION, SOLUTION INTRAVENOUS CONTINUOUS
OUTPATIENT
Start: 2024-11-19

## 2024-11-19 RX ORDER — POLYETHYLENE GLYCOL 3350, SODIUM SULFATE ANHYDROUS, SODIUM BICARBONATE, SODIUM CHLORIDE, POTASSIUM CHLORIDE 236; 22.74; 6.74; 5.86; 2.97 G/4L; G/4L; G/4L; G/4L; G/4L
4000 POWDER, FOR SOLUTION ORAL ONCE
Qty: 4000 ML | Refills: 0 | Status: SHIPPED | OUTPATIENT
Start: 2024-11-19 | End: 2024-11-19

## 2024-11-19 RX ORDER — BISACODYL 5 MG/1
20 TABLET, DELAYED RELEASE ORAL SEE ADMIN INSTRUCTIONS
Qty: 4 TABLET | Refills: 0 | Status: SHIPPED | OUTPATIENT
Start: 2024-11-19

## 2024-11-19 NOTE — H&P (VIEW-ONLY)
"Name: Aman Vasquez      : 1950      MRN: [id number]  Encounter Provider: Payam Valverde MD  Encounter Date: 2024   Encounter department: [facility] [place]  :  Assessment & Plan  History of colon polyps  Patient had a small adenomatous polyp in . He is due for surveillance colonoscopy. Risks and benefits were discussed.    - Colonoscopy, Golytely    Orders:    Colonoscopy; Future    polyethylene glycol (Golytely) 4000 mL solution; Take 4,000 mL by mouth once for 1 dose Take 4000 mL by mouth once for 1 dose. Use as directed    bisacodyl (DULCOLAX) 5 mg EC tablet; Take 4 tablets (20 mg total) by mouth see administration instructions For colonoscopy prep.        History of Present Illness     HPI  Aman Vasquez is a 74 y.o. male w/ hx of BPH who [facility].    Patient denies abdominal pain, diarrhea, constipation, hematochezia, melena, or unintentional weight loss. No family history of colon cancer.     Last colonoscopy  showed a 5 mm TA in the descending colon; 3-year follow up recommended.        Review of Systems  Past Medical History   Past Medical History:   Diagnosis Date    Hemorrhoids      History reviewed. No pertinent surgical history.  Family History   Problem Relation Age of Onset    Cancer Mother     Cancer Father     Cancer Daughter       reports that he has never smoked. He has never used smokeless tobacco. He reports that he does not currently use alcohol. He reports that he does not use drugs.  No current outpatient medications on file prior to visit.     No current facility-administered medications on file prior to visit.   No Known Allergies        Objective   /74 (Patient Position: Sitting, Cuff Size: Large)   Pulse 67   Temp 98.4 °F (36.9 °C) (Tympanic)   Ht 6' 5\" (1.956 m)   Wt 126 kg (277 lb 9.6 oz)   SpO2 99%   BMI 32.92 kg/m²      Physical Exam  General: No acute distress  Abdomen: Soft, " non-tender, non-distended, normoactive bowel sounds  Neuro: Awake, alert, oriented x 3

## 2024-11-19 NOTE — TELEPHONE ENCOUNTER
Procedure: Colonoscopy  Date: 12/18/2024  Physician performing: Dr. Valverde  Location of procedure:  [place]  Instructions given to patient: Golytely   Diabetic: N/A  Clearances: N/A

## 2024-11-19 NOTE — PROGRESS NOTES
"Name: Aman Vasquez      : 1950      MRN: [id number]  Encounter Provider: Payam Valverde MD  Encounter Date: 2024   Encounter department: [facility] [place]  :  Assessment & Plan  History of colon polyps  Patient had a small adenomatous polyp in . He is due for surveillance colonoscopy. Risks and benefits were discussed.    - Colonoscopy, Golytely    Orders:    Colonoscopy; Future    polyethylene glycol (Golytely) 4000 mL solution; Take 4,000 mL by mouth once for 1 dose Take 4000 mL by mouth once for 1 dose. Use as directed    bisacodyl (DULCOLAX) 5 mg EC tablet; Take 4 tablets (20 mg total) by mouth see administration instructions For colonoscopy prep.        History of Present Illness     HPI  Aman Vasquez is a 74 y.o. male w/ hx of BPH who [facility].    Patient denies abdominal pain, diarrhea, constipation, hematochezia, melena, or unintentional weight loss. No family history of colon cancer.     Last colonoscopy  showed a 5 mm TA in the descending colon; 3-year follow up recommended.        Review of Systems  Past Medical History   Past Medical History:   Diagnosis Date    Hemorrhoids      History reviewed. No pertinent surgical history.  Family History   Problem Relation Age of Onset    Cancer Mother     Cancer Father     Cancer Daughter       reports that he has never smoked. He has never used smokeless tobacco. He reports that he does not currently use alcohol. He reports that he does not use drugs.  No current outpatient medications on file prior to visit.     No current facility-administered medications on file prior to visit.   No Known Allergies        Objective   /74 (Patient Position: Sitting, Cuff Size: Large)   Pulse 67   Temp 98.4 °F (36.9 °C) (Tympanic)   Ht 6' 5\" (1.956 m)   Wt 126 kg (277 lb 9.6 oz)   SpO2 99%   BMI 32.92 kg/m²      Physical Exam  General: No acute distress  Abdomen: Soft, " non-tender, non-distended, normoactive bowel sounds  Neuro: Awake, alert, oriented x 3

## 2024-11-26 ENCOUNTER — HOSPITAL ENCOUNTER (OUTPATIENT)
Facility: MEDICAL CENTER | Age: 74
Discharge: HOME | End: 2024-11-26
Payer: COMMERCIAL

## 2024-11-26 DIAGNOSIS — R97.20 ELEVATED PSA, GREATER THAN OR EQUAL TO 20 NG/ML: ICD-10-CM

## 2024-11-26 PROCEDURE — A9585 GADOBUTROL INJECTION: HCPCS | Performed by: UROLOGY

## 2024-11-26 PROCEDURE — 72197 MRI PELVIS W/O & W/DYE: CPT

## 2024-11-26 PROCEDURE — 76377 3D RENDER W/INTRP POSTPROCES: CPT

## 2024-11-26 RX ORDER — GADOBUTROL 604.72 MG/ML
12 INJECTION INTRAVENOUS
Status: COMPLETED | OUTPATIENT
Start: 2024-11-26 | End: 2024-11-26

## 2024-11-26 RX ADMIN — GADOBUTROL 12 ML: 604.72 INJECTION INTRAVENOUS at 11:46

## 2024-12-02 ENCOUNTER — APPOINTMENT (OUTPATIENT)
Dept: LAB | Age: 74
End: 2024-12-02
Payer: COMMERCIAL

## 2024-12-02 ENCOUNTER — TELEPHONE (OUTPATIENT)
Age: 74
End: 2024-12-02

## 2024-12-02 DIAGNOSIS — N40.1 BPH WITH URINARY OBSTRUCTION: ICD-10-CM

## 2024-12-02 DIAGNOSIS — R97.20 ELEVATED PSA, GREATER THAN OR EQUAL TO 20 NG/ML: ICD-10-CM

## 2024-12-02 DIAGNOSIS — N13.8 BPH WITH URINARY OBSTRUCTION: ICD-10-CM

## 2024-12-02 LAB
ANION GAP SERPL CALCULATED.3IONS-SCNC: 9 MMOL/L (ref 4–13)
BACTERIA UR QL AUTO: ABNORMAL /HPF
BILIRUB UR QL STRIP: NEGATIVE
BUN SERPL-MCNC: 18 MG/DL (ref 5–25)
CALCIUM SERPL-MCNC: 9.1 MG/DL (ref 8.4–10.2)
CHLORIDE SERPL-SCNC: 111 MMOL/L (ref 96–108)
CLARITY UR: CLEAR
CO2 SERPL-SCNC: 22 MMOL/L (ref 21–32)
COLOR UR: ABNORMAL
CREAT SERPL-MCNC: 0.93 MG/DL (ref 0.6–1.3)
GFR SERPL CREATININE-BSD FRML MDRD: 79 ML/MIN/1.73SQ M
GLUCOSE P FAST SERPL-MCNC: 92 MG/DL (ref 65–99)
GLUCOSE UR STRIP-MCNC: NEGATIVE MG/DL
HGB UR QL STRIP.AUTO: ABNORMAL
KETONES UR STRIP-MCNC: NEGATIVE MG/DL
LEUKOCYTE ESTERASE UR QL STRIP: ABNORMAL
NITRITE UR QL STRIP: NEGATIVE
NON-SQ EPI CELLS URNS QL MICRO: ABNORMAL /HPF
PH UR STRIP.AUTO: 6 [PH]
POTASSIUM SERPL-SCNC: 4.2 MMOL/L (ref 3.5–5.3)
PROT UR STRIP-MCNC: ABNORMAL MG/DL
PSA SERPL-MCNC: 32.06 NG/ML (ref 0–4)
RBC #/AREA URNS AUTO: ABNORMAL /HPF
SODIUM SERPL-SCNC: 142 MMOL/L (ref 135–147)
SP GR UR STRIP.AUTO: 1.02 (ref 1–1.03)
UROBILINOGEN UR STRIP-ACNC: <2 MG/DL
WBC #/AREA URNS AUTO: ABNORMAL /HPF

## 2024-12-02 PROCEDURE — 36415 COLL VENOUS BLD VENIPUNCTURE: CPT

## 2024-12-02 PROCEDURE — 81001 URINALYSIS AUTO W/SCOPE: CPT

## 2024-12-02 PROCEDURE — 84153 ASSAY OF PSA TOTAL: CPT

## 2024-12-02 PROCEDURE — 80048 BASIC METABOLIC PNL TOTAL CA: CPT

## 2024-12-02 NOTE — TELEPHONE ENCOUNTER
Radiology Test Results - Radiology Calling with report update    Pt under care of: Dr. Falcon    Imaging Completed: 11/26/24    Significant Findings - Please review

## 2024-12-03 ENCOUNTER — PROCEDURE VISIT (OUTPATIENT)
Dept: UROLOGY | Facility: MEDICAL CENTER | Age: 74
End: 2024-12-03
Payer: COMMERCIAL

## 2024-12-03 VITALS
SYSTOLIC BLOOD PRESSURE: 130 MMHG | BODY MASS INDEX: 32.71 KG/M2 | HEIGHT: 77 IN | HEART RATE: 60 BPM | OXYGEN SATURATION: 98 % | DIASTOLIC BLOOD PRESSURE: 76 MMHG | WEIGHT: 277 LBS

## 2024-12-03 DIAGNOSIS — R97.20 ELEVATED PSA: Primary | ICD-10-CM

## 2024-12-03 DIAGNOSIS — N40.0 BPH WITH ELEVATED PSA: ICD-10-CM

## 2024-12-03 DIAGNOSIS — N32.89 BLADDER MASS: ICD-10-CM

## 2024-12-03 DIAGNOSIS — R97.20 BPH WITH ELEVATED PSA: ICD-10-CM

## 2024-12-03 PROCEDURE — 87086 URINE CULTURE/COLONY COUNT: CPT | Performed by: UROLOGY

## 2024-12-03 PROCEDURE — 99214 OFFICE O/P EST MOD 30 MIN: CPT | Performed by: UROLOGY

## 2024-12-03 PROCEDURE — 88112 CYTOPATH CELL ENHANCE TECH: CPT | Performed by: PATHOLOGY

## 2024-12-03 RX ORDER — LEVOFLOXACIN 500 MG/1
500 TABLET, FILM COATED ORAL EVERY 24 HOURS
Qty: 3 TABLET | Refills: 0 | Status: SHIPPED | OUTPATIENT
Start: 2024-12-03 | End: 2024-12-06

## 2024-12-03 RX ORDER — DIAZEPAM 2 MG/1
2 TABLET ORAL ONCE
Qty: 1 TABLET | Refills: 0 | Status: SHIPPED | OUTPATIENT
Start: 2024-12-03 | End: 2024-12-03

## 2024-12-03 NOTE — PROGRESS NOTES
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  51.7 cc prostate  - UCX  - schedule for office TRUS prostate biopsy   - patient understands he needs transportation if he takes diazepam for procedure  - start levofloxacin day prior to biopsy  - does not take blood thinners    Orders:    diazepam (VALIUM) 2 mg tablet; Take 1 tablet (2 mg total) by mouth 1 (one) time for 1 dose Take prior to prostate biopsy    levofloxacin (LEVAQUIN) 500 mg tablet; Take 1 tablet (500 mg total) by mouth every 24 hours for 3 days Start day prior to prostate biopsy    BPH with elevated PSA  51 cc prostate  - continue to monitor urinary symptoms for now    Orders:    Urine culture    diazepam (VALIUM) 2 mg tablet; Take 1 tablet (2 mg total) by mouth 1 (one) time for 1 dose Take prior to prostate biopsy    Bladder mass  1.2 cm enhancing right-sided polypoid soft tissue within bladder on MRI  - urine cytology  - will proceed with cystoscopy at time of prostate biopsy     Orders:    Cytology, urine        History of Present Illness   Aman Vasquez is a 74 y.o. male who presents for follow up     Completed MRI prostate that showed PI-RADS 5 lesion with concern for ISH, a small 1.2 cm bladder mass and a 1 cm pelvic lymph node    Repeat PSA 32        Review of Systems   All other systems reviewed and are negative.    Past Medical History   Past Medical History:   Diagnosis Date    Hemorrhoids      History reviewed. No pertinent surgical history.  Family History   Problem Relation Age of Onset    Cancer Mother     Cancer Father     Cancer  "Daughter       reports that he has never smoked. He has never used smokeless tobacco. He reports current alcohol use. He reports that he does not use drugs.  Current Outpatient Medications on File Prior to Visit   Medication Sig Dispense Refill    bisacodyl (DULCOLAX) 5 mg EC tablet Take 4 tablets (20 mg total) by mouth see administration instructions For colonoscopy prep. (Patient not taking: Reported on 12/3/2024) 4 tablet 0    polyethylene glycol (Golytely) 4000 mL solution Take 4,000 mL by mouth once for 1 dose Take 4000 mL by mouth once for 1 dose. Use as directed 4000 mL 0     No current facility-administered medications on file prior to visit.   No Known Allergies      Objective   /76 (BP Location: Left arm, Patient Position: Sitting, Cuff Size: Large)   Pulse 60   Ht 6' 5\" (1.956 m)   Wt 126 kg (277 lb)   SpO2 98%   BMI 32.85 kg/m²     Physical Exam  Vitals and nursing note reviewed.   Constitutional:       General: He is not in acute distress.     Appearance: He is well-developed.   HENT:      Head: Normocephalic and atraumatic.   Eyes:      Conjunctiva/sclera: Conjunctivae normal.   Cardiovascular:      Rate and Rhythm: Normal rate and regular rhythm.      Heart sounds: No murmur heard.  Pulmonary:      Effort: Pulmonary effort is normal. No respiratory distress.      Breath sounds: Normal breath sounds.   Abdominal:      Palpations: Abdomen is soft.      Tenderness: There is no abdominal tenderness.   Musculoskeletal:         General: No swelling.      Cervical back: Neck supple.   Skin:     General: Skin is warm and dry.      Capillary Refill: Capillary refill takes less than 2 seconds.   Neurological:      Mental Status: He is alert.   Psychiatric:         Mood and Affect: Mood normal.          Results  Lab Results   Component Value Date    PSA 32.064 (H) 12/02/2024    PSA 22.541 (H) 07/31/2024     Lab Results   Component Value Date    CALCIUM 9.1 12/02/2024    K 4.2 12/02/2024    CO2 22 " 12/02/2024     (H) 12/02/2024    BUN 18 12/02/2024    CREATININE 0.93 12/02/2024     Lab Results   Component Value Date    WBC 6.52 07/31/2024    HGB 14.5 07/31/2024    HCT 43.6 07/31/2024    MCV 96 07/31/2024     (L) 07/31/2024       Office Urine Dip  No results found for this or any previous visit (from the past hour).]

## 2024-12-04 ENCOUNTER — ANESTHESIA EVENT (OUTPATIENT)
Dept: ANESTHESIOLOGY | Facility: HOSPITAL | Age: 74
End: 2024-12-04

## 2024-12-04 ENCOUNTER — TELEPHONE (OUTPATIENT)
Age: 74
End: 2024-12-04

## 2024-12-04 ENCOUNTER — TELEPHONE (OUTPATIENT)
Dept: UROLOGY | Facility: MEDICAL CENTER | Age: 74
End: 2024-12-04

## 2024-12-04 ENCOUNTER — ANESTHESIA (OUTPATIENT)
Dept: ANESTHESIOLOGY | Facility: HOSPITAL | Age: 74
End: 2024-12-04

## 2024-12-04 NOTE — TELEPHONE ENCOUNTER
Pt called, sts that he has missed a call from Urology.  Informed pt of appt on 12/6/24.  Also warm trans pt to office staff to go over the appt.

## 2024-12-04 NOTE — TELEPHONE ENCOUNTER
Patient is aware of his appt with Dr. Falcon for prostate biopsy.  He is to come in at 8 AM for injection and 8:30 appt for biopsy.    Emailed instructions to patient so he would have them prior to his biopsy.  The patient said he takes no medications so he is on no blood thinners.

## 2024-12-05 ENCOUNTER — TELEPHONE (OUTPATIENT)
Dept: GASTROENTEROLOGY | Facility: MEDICAL CENTER | Age: 74
End: 2024-12-05

## 2024-12-05 NOTE — TELEPHONE ENCOUNTER
Confirming Upcoming Procedure: Colonoscopy on December 18  Physician performing: Dr. Valverde  Location of procedure:  [facility] [place]  Prep: Golytely  Diabetic: No

## 2024-12-06 ENCOUNTER — OFFICE VISIT (OUTPATIENT)
Dept: UROLOGY | Facility: MEDICAL CENTER | Age: 74
End: 2024-12-06
Payer: COMMERCIAL

## 2024-12-06 VITALS
HEIGHT: 77 IN | SYSTOLIC BLOOD PRESSURE: 138 MMHG | BODY MASS INDEX: 26.8 KG/M2 | HEART RATE: 71 BPM | DIASTOLIC BLOOD PRESSURE: 82 MMHG | OXYGEN SATURATION: 96 % | WEIGHT: 227 LBS

## 2024-12-06 DIAGNOSIS — N32.89 BLADDER MASS: ICD-10-CM

## 2024-12-06 DIAGNOSIS — R97.20 ELEVATED PSA: Primary | ICD-10-CM

## 2024-12-06 LAB
BACTERIA UR CULT: NORMAL
SL AMB  POCT GLUCOSE, UA: ABNORMAL
SL AMB LEUKOCYTE ESTERASE,UA: ABNORMAL
SL AMB POCT BILIRUBIN,UA: ABNORMAL
SL AMB POCT BLOOD,UA: ABNORMAL
SL AMB POCT CLARITY,UA: CLEAR
SL AMB POCT COLOR,UA: YELLOW
SL AMB POCT KETONES,UA: ABNORMAL
SL AMB POCT NITRITE,UA: ABNORMAL
SL AMB POCT PH,UA: 5.5
SL AMB POCT SPECIFIC GRAVITY,UA: 1.02
SL AMB POCT URINE PROTEIN: ABNORMAL
SL AMB POCT UROBILINOGEN: 0.2

## 2024-12-06 PROCEDURE — 76942 ECHO GUIDE FOR BIOPSY: CPT | Performed by: UROLOGY

## 2024-12-06 PROCEDURE — 88112 CYTOPATH CELL ENHANCE TECH: CPT | Performed by: PATHOLOGY

## 2024-12-06 PROCEDURE — 55700 PR PROSTATE NEEDLE BIOPSY ANY APPROACH: CPT | Performed by: UROLOGY

## 2024-12-06 PROCEDURE — 88342 IMHCHEM/IMCYTCHM 1ST ANTB: CPT | Performed by: PATHOLOGY

## 2024-12-06 PROCEDURE — 88344 IMHCHEM/IMCYTCHM EA MLT ANTB: CPT | Performed by: PATHOLOGY

## 2024-12-06 PROCEDURE — G0416 PROSTATE BIOPSY, ANY MTHD: HCPCS | Performed by: PATHOLOGY

## 2024-12-06 PROCEDURE — 52000 CYSTOURETHROSCOPY: CPT | Performed by: UROLOGY

## 2024-12-06 PROCEDURE — 81003 URINALYSIS AUTO W/O SCOPE: CPT | Performed by: UROLOGY

## 2024-12-06 PROCEDURE — 96372 THER/PROPH/DIAG INJ SC/IM: CPT

## 2024-12-06 PROCEDURE — 88341 IMHCHEM/IMCYTCHM EA ADD ANTB: CPT | Performed by: PATHOLOGY

## 2024-12-06 RX ORDER — CEFTRIAXONE 1 G/1
1000 INJECTION, POWDER, FOR SOLUTION INTRAMUSCULAR; INTRAVENOUS ONCE
Status: COMPLETED | OUTPATIENT
Start: 2024-12-06 | End: 2024-12-06

## 2024-12-06 RX ADMIN — CEFTRIAXONE 1000 MG: 1 INJECTION, POWDER, FOR SOLUTION INTRAMUSCULAR; INTRAVENOUS at 08:28

## 2024-12-06 NOTE — PROGRESS NOTES
"    Biopsy prostate     Date/Time  12/6/2024 8:30 AM     Performed by  Memo Falcon MD   Authorized by  Memo Falcon MD     Universal Protocol   procedure performed by consultantConsent: Verbal consent obtained. Written consent obtained.  Risks and benefits: risks, benefits and alternatives were discussed  Consent given by: patient  Time out: Immediately prior to procedure a \"time out\" was called to verify the correct patient, procedure, equipment, support staff and site/side marked as required.  Timeout called at: 12/6/2024 8:04 AM.  Patient understanding: patient states understanding of the procedure being performed  Patient consent: the patient's understanding of the procedure matches consent given  Procedure consent: procedure consent matches procedure scheduled  Relevant documents: relevant documents present and verified  Test results: test results available and properly labeled  Site marked: the operative site was not marked  Radiology Images displayed and confirmed. If images not available, report reviewed: imaging studies available  Required items: required blood products, implants, devices, and special equipment available  Patient identity confirmed: verbally with patient      Local anesthesia used: yes      Anesthesia: local infiltration     Anesthesia   Local anesthesia used: yes  Local Anesthetic: lidocaine 2% without epinephrine     Sedation   Patient sedated: no        Specimen: yes    Culture: no   Procedure Details   Procedure Notes: Operative note:    Date of procedure: 12/6/2024    Preoperative diagnosis: Elevated PSA    Postoperative diagnosis: Elevated PSA    Surgeon:  Memo Falcon MD    Anesthesia:  Local anesthesia    Procedure performed:  Transrectal ultrasound biopsy of the prostate, transrectal periprosthetic nerve block    Indications for procedure:      On outpatient basis the patient presented with elevated PSA.  The workup of elevated prostate specific antigen was discussed " with the patient at length including a discussion of the risks, benefits, and alternatives of transrectal ultrasound biopsy of the prostate.  Risks of this procedure include but are not limited to infection, bleeding, pain, sexual dysfunction, risk of damage to surrounding structures, erectile dysfunction, risk of diagnosis of cancer, and need for additional procedures and biopsies.  Additional risk of this procedure is the risk of false negative prostate biopsy which occurs in 20-30% of transrectal ultrasound prostate biopsies.  The benefits of this procedure include obtaining tissue for diagnosis of possible prostate cancer versus prostatitis.  Other benign conditions can also be diagnosis via this procedure.  The alternative of this procedure is to perform a magnetic resonance imaging study of the prostate with continued digital rectal examination and surveillance of elevated prostate specific antigen.  After weighing the risks, benefits, and alternatives the patient wished to proceed with the procedure.      Details of procedure:   After administration again reviewing the details of the procedure, the patient was placed in the left lateral decubitus position taking care to pad all contact points.  Following this, 10 cc viscous lidocaine was administered.  A digital rectal examination revealed firm nodular prostate. With ultrasound guidance, the transrectal probe was placed and a periprosthetic nerve block was administered with local anesthesia.  Serial images of the prostate were obtained and the prostate was measured revealing a 40 gram gland (3.4 x 5.1 x 4.4 cm).  Prostate needle biopsies were then retrieved in the usual fashion sampling tissue from the medial and lateral base, mid gland, and apex bilaterally.  The ultrasound probe was then removed and digital pressure was applied to the prostate to promote hemostasis.    A gauze was then placed in the gluteal cleft the patient was returned to the supine  "position. All instrument counts and sponge counts were correct.  The patient tolerated today's procedure without issue, event, or complication.    Plan:  The patient was advised of the risk for blood in the urine, semen, and stool for up to 5-6 weeks.  The need for vigorous oral hydration and timed voiding was discussed with him.  He verbalized understanding that should he develop fever, chills, or malaise he should report this to our team immediately and present hospital.  He will also monitor for excessive bleeding and signs of hematoma formation.    He will follow up with us in the office in 2 weeks time to discuss his biopsy results and determine further management and follow-up.       Patient Transportation: confirmed  Patient tolerance: patient tolerated the procedure well with no immediate complications            Cystoscopy     Date/Time  12/6/2024 8:30 AM     Performed by  Memo Falcon MD   Authorized by  Memo Falcon MD     Universal Protocol:  procedure performed by consultantConsent: Verbal consent obtained. Written consent obtained.  Risks and benefits: risks, benefits and alternatives were discussed  Consent given by: patient  Time out: Immediately prior to procedure a \"time out\" was called to verify the correct patient, procedure, equipment, support staff and site/side marked as required.  Timeout called at: 12/6/2024 8:07 AM.  Patient understanding: patient states understanding of the procedure being performed  Patient consent: the patient's understanding of the procedure matches consent given  Procedure consent: procedure consent matches procedure scheduled  Relevant documents: relevant documents present and verified  Test results: test results available and properly labeled  Site marked: the operative site was not marked  Radiology Images displayed and confirmed. If images not available, report reviewed: imaging studies available  Required items: required blood products, implants, " devices, and special equipment available  Patient identity confirmed: verbally with patient      Procedure Details:  Procedure type: cystoscopy    Patient tolerance: Patient tolerated the procedure well with no immediate complications    Additional Procedure Details: Office Cystoscopy Procedure Note    Indication:    Bladder mass on MRI    Informed consent   The risks, benefits, complications, treatment options, and expected outcomes were discussed with the patient. The patient concurred with the proposed plan and provided informed consent.    Anesthesia  Lidocaine jelly 2%    Antibiotic prophylaxis   Ceftriaxone 1 g     Procedure  The patient was placed in the supineposition, was prepped and draped in the usual manner using sterile technique, and 2% lidocaine jelly instilled into the urethra.  A 17 F flexible cystoscope was then inserted into the urethra and the urethra and bladder carefully examined.  The following findings were noted:    Findings:  Urethra:  Normal  Prostate:  moderate lateral lobe hypertrophy, no lesions within prostatic urethra  Bladder:  1.5 cm papillary tumor along right lateral wall of bladder, sessile erythematous and friable appearing mucosa along left lateral wall of bladder, no diverticulum, no stones  Ureteral orifices:  Orthotopic with clear efflux of urine  Other findings:  None, retroflexed view confirms    Specimens: None                 Complications:    None; patient tolerated the procedure well           Disposition: To home            Condition: Stable    Plan: OR for TURBT

## 2024-12-09 ENCOUNTER — PREP FOR PROCEDURE (OUTPATIENT)
Dept: UROLOGY | Facility: MEDICAL CENTER | Age: 74
End: 2024-12-09

## 2024-12-09 ENCOUNTER — TELEPHONE (OUTPATIENT)
Dept: UROLOGY | Facility: MEDICAL CENTER | Age: 74
End: 2024-12-09

## 2024-12-09 DIAGNOSIS — Z01.810 PRE-OPERATIVE CARDIOVASCULAR EXAMINATION: ICD-10-CM

## 2024-12-09 DIAGNOSIS — R39.89 SUSPECTED UTI: ICD-10-CM

## 2024-12-09 DIAGNOSIS — N32.89 BLADDER MASS: Primary | ICD-10-CM

## 2024-12-09 DIAGNOSIS — Z01.812 PRE-OPERATIVE LABORATORY EXAMINATION: ICD-10-CM

## 2024-12-09 NOTE — TELEPHONE ENCOUNTER
Spoke with patient and confirmed surgery date of  1/10/25  Type of surgery: TURBT  Operating physician:Dr. Falcon  Location of surgery: [facility]    Verbally went over prep with patient on 12/9/24  NPO  Bowel prep? No  Hospital calls afternoon prior with arrival time (calls Friday afternoon for Monday surgery)  Patient needs ride to and from surgery   outpatient  Pre-op testing to be done 2 weeks prior to surgery. All testing can be done as a walk-in. EKG can only be done as a walk-in at any [facility].  CBC, UCX, EKG  Blood thinners:   None  Clearances needed: None    Mailedto patient on 12/9/24  Copy of packet scanned into Media  Labs in packet and in electronic record   Soap prep in packet  post-op in packet     Consent: in media

## 2024-12-13 PROCEDURE — 88344 IMHCHEM/IMCYTCHM EA MLT ANTB: CPT | Performed by: PATHOLOGY

## 2024-12-13 PROCEDURE — 88342 IMHCHEM/IMCYTCHM 1ST ANTB: CPT | Performed by: PATHOLOGY

## 2024-12-13 PROCEDURE — 88341 IMHCHEM/IMCYTCHM EA ADD ANTB: CPT | Performed by: PATHOLOGY

## 2024-12-13 PROCEDURE — G0416 PROSTATE BIOPSY, ANY MTHD: HCPCS | Performed by: PATHOLOGY

## 2024-12-16 ENCOUNTER — TELEPHONE (OUTPATIENT)
Age: 74
End: 2024-12-16

## 2024-12-16 DIAGNOSIS — L03.312 CELLULITIS OF BACK: Primary | ICD-10-CM

## 2024-12-16 NOTE — TELEPHONE ENCOUNTER
Patient called requesting a referral to dermatology for a rash that he has had on his back since October.    Patient would like a call back once this order is placed. [phone]

## 2024-12-18 ENCOUNTER — RESULTS FOLLOW-UP (OUTPATIENT)
Dept: UROLOGY | Facility: MEDICAL CENTER | Age: 74
End: 2024-12-18

## 2024-12-18 ENCOUNTER — ANESTHESIA (OUTPATIENT)
Dept: GASTROENTEROLOGY | Facility: MEDICAL CENTER | Age: 74
End: 2024-12-18
Payer: COMMERCIAL

## 2024-12-18 ENCOUNTER — HOSPITAL ENCOUNTER (OUTPATIENT)
Dept: GASTROENTEROLOGY | Facility: MEDICAL CENTER | Age: 74
Setting detail: OUTPATIENT SURGERY
Discharge: HOME | End: 2024-12-18
Payer: COMMERCIAL

## 2024-12-18 ENCOUNTER — ANESTHESIA EVENT (OUTPATIENT)
Dept: GASTROENTEROLOGY | Facility: MEDICAL CENTER | Age: 74
End: 2024-12-18
Payer: COMMERCIAL

## 2024-12-18 VITALS
WEIGHT: 268 LBS | HEART RATE: 67 BPM | OXYGEN SATURATION: 98 % | BODY MASS INDEX: 31.78 KG/M2 | DIASTOLIC BLOOD PRESSURE: 72 MMHG | SYSTOLIC BLOOD PRESSURE: 132 MMHG | RESPIRATION RATE: 16 BRPM

## 2024-12-18 DIAGNOSIS — Z86.0100 HISTORY OF COLON POLYPS: ICD-10-CM

## 2024-12-18 PROCEDURE — 88305 TISSUE EXAM BY PATHOLOGIST: CPT | Performed by: PATHOLOGY

## 2024-12-18 PROCEDURE — 45380 COLONOSCOPY AND BIOPSY: CPT | Performed by: STUDENT IN AN ORGANIZED HEALTH CARE EDUCATION/TRAINING PROGRAM

## 2024-12-18 RX ORDER — SODIUM CHLORIDE 9 MG/ML
125 INJECTION, SOLUTION INTRAVENOUS CONTINUOUS
Status: DISCONTINUED | OUTPATIENT
Start: 2024-12-18 | End: 2024-12-22 | Stop reason: HOSPADM

## 2024-12-18 RX ORDER — SODIUM CHLORIDE, SODIUM LACTATE, POTASSIUM CHLORIDE, CALCIUM CHLORIDE 600; 310; 30; 20 MG/100ML; MG/100ML; MG/100ML; MG/100ML
125 INJECTION, SOLUTION INTRAVENOUS CONTINUOUS
Status: DISCONTINUED | OUTPATIENT
Start: 2024-12-18 | End: 2024-12-22 | Stop reason: HOSPADM

## 2024-12-18 RX ORDER — ONDANSETRON 2 MG/ML
4 INJECTION INTRAMUSCULAR; INTRAVENOUS ONCE AS NEEDED
Status: DISCONTINUED | OUTPATIENT
Start: 2024-12-18 | End: 2024-12-22 | Stop reason: HOSPADM

## 2024-12-18 RX ORDER — PROPOFOL 10 MG/ML
INJECTION, EMULSION INTRAVENOUS AS NEEDED
Status: DISCONTINUED | OUTPATIENT
Start: 2024-12-18 | End: 2024-12-18

## 2024-12-18 RX ADMIN — PROPOFOL 80 MG: 10 INJECTION, EMULSION INTRAVENOUS at 08:57

## 2024-12-18 RX ADMIN — PROPOFOL 40 MG: 10 INJECTION, EMULSION INTRAVENOUS at 09:03

## 2024-12-18 RX ADMIN — SODIUM CHLORIDE 125 ML/HR: 0.9 INJECTION, SOLUTION INTRAVENOUS at 08:17

## 2024-12-18 RX ADMIN — PROPOFOL 50 MG: 10 INJECTION, EMULSION INTRAVENOUS at 09:00

## 2024-12-18 RX ADMIN — PROPOFOL 50 MG: 10 INJECTION, EMULSION INTRAVENOUS at 09:08

## 2024-12-18 RX ADMIN — Medication 40 MG: at 09:02

## 2024-12-18 RX ADMIN — PROPOFOL 50 MG: 10 INJECTION, EMULSION INTRAVENOUS at 09:14

## 2024-12-18 NOTE — ANESTHESIA POSTPROCEDURE EVALUATION
Post-Op Assessment Note    CV Status:  Stable  Pain Score: 0    Pain management: adequate       Mental Status:  Awake   Hydration Status:  Euvolemic   PONV Controlled:  Controlled   Airway Patency:  Patent     Post Op Vitals Reviewed: Yes    No anethesia notable event occurred.    Staff: CRNA           Last Filed PACU Vitals:  Vitals Value Taken Time   Temp     Pulse 68 12/18/24 0921   BP 98/53 12/18/24 0921   Resp 18 12/18/24 0921   SpO2 98 % 12/18/24 0921       Modified Jack:  Activity: 2 (12/18/2024  8:08 AM)  Respiration: 2 (12/18/2024  8:08 AM)  Circulation: 2 (12/18/2024  8:08 AM)  Consciousness: 2 (12/18/2024  8:08 AM)  Oxygen Saturation: 2 (12/18/2024  8:08 AM)  Modified Jack Score: 10 (12/18/2024  8:08 AM)

## 2024-12-18 NOTE — TELEPHONE ENCOUNTER
----- Message from Memo Falcon MD sent at 12/18/2024  8:11 AM EST -----  High risk prostate cancer.  Needs PSMA PET scan. Don't think he has an appointment scheduled to review pathology results.

## 2024-12-18 NOTE — TELEPHONE ENCOUNTER
Call placed to Pt and spoke with his Daughter and she is aware of the result and coming up appointment to discuss options with MD.

## 2024-12-18 NOTE — TELEPHONE ENCOUNTER
Call placed to Pt and left message on his VM to please call back the office for his recommendation from the Doctor Ezekiel.

## 2024-12-18 NOTE — ANESTHESIA PREPROCEDURE EVALUATION
Procedure:  COLONOSCOPY    Relevant Problems   /RENAL   (+) BPH with urinary obstruction      HEMATOLOGY   (+) Anemia   (+) Thrombocytopenia (HCC)      MUSCULOSKELETAL   (+) Chronic midline low back pain without sciatica      NEURO/PSYCH   (+) Chronic midline low back pain without sciatica      Latest Reference Range & Units 07/31/24 09:09   Platelet Count 149 - 390 Thousands/uL 139 (L)   (L): Data is abnormally low     Physical Exam    Airway    Mallampati score: II  TM Distance: >3 FB  Neck ROM: full     Dental   Comment: Denies loose teeth     Cardiovascular  Cardiovascular exam normal    Pulmonary  Pulmonary exam normal     Other Findings  Portions of exam deferred due to low yield and/or unknown COVID status      Anesthesia Plan  ASA Score- 2     Anesthesia Type- IV sedation with anesthesia with ASA Monitors.         Additional Monitors:     Airway Plan:            Plan Factors-Exercise tolerance (METS): >4 METS.    Chart reviewed.   Existing labs reviewed. Patient summary reviewed.    Patient is not a current smoker.              Induction- intravenous.    Postoperative Plan-         Informed Consent- Anesthetic plan and risks discussed with patient.  I personally reviewed this patient with the CRNA. Discussed and agreed on the Anesthesia Plan with the CRNA..

## 2024-12-19 NOTE — TELEPHONE ENCOUNTER
Patient called to say that he'd received a call to schedule a petscan.    Pt was advised to discuss this at the time of his appt tomorrow 12/20 at 11 AM with Dr Falcon due to the time of day and the office being closed. Pt verbalized understanding.

## 2024-12-20 ENCOUNTER — OFFICE VISIT (OUTPATIENT)
Dept: UROLOGY | Facility: MEDICAL CENTER | Age: 74
End: 2024-12-20
Payer: COMMERCIAL

## 2024-12-20 VITALS
OXYGEN SATURATION: 97 % | WEIGHT: 268 LBS | BODY MASS INDEX: 31.64 KG/M2 | SYSTOLIC BLOOD PRESSURE: 128 MMHG | HEART RATE: 60 BPM | HEIGHT: 77 IN | DIASTOLIC BLOOD PRESSURE: 76 MMHG

## 2024-12-20 DIAGNOSIS — C61 PROSTATE CANCER (HCC): Primary | ICD-10-CM

## 2024-12-20 DIAGNOSIS — D49.4 BLADDER TUMOR: ICD-10-CM

## 2024-12-20 PROCEDURE — 99213 OFFICE O/P EST LOW 20 MIN: CPT | Performed by: UROLOGY

## 2024-12-20 NOTE — PROGRESS NOTES
Name: Aman Vasquez      : 1950      MRN: [id number]  Encounter Provider: Memo Falcon MD  Encounter Date: 2024   [facility]  :  Assessment & Plan  Prostate cancer (HCC)  High risk prostate cancer  GG 4, PSA 32, probable ISH on MRI, 1 cm right external iliac lymph node    Discussed with patient his new diagnosis of prostate cancer    Discussed primary management options for prostate cancer are active surveillance, radiation therapy (with or without ADT), and surgery (radical prostatectomy)    Two trials (SPCG-4 and PIVOT) have compared radical prostatectomy to watchful waiting:  SPCG-4 demonstrated an absolute overall survival benefit of 12% (median 2.9 years of life gained) with radical prostatectomy at median 23 years of follow-up  PIVOT demonstrated no difference in overall or disease-specific survival at 12.7 years follow-up, but surgery reduced the risk of progression (40.9% vs 68.4%; HR 0.39, 95% CI 0.32-0.48) and treatment for progression (33.5% vs 59.7%; HR 0.45, 95% CI 0.36-0.56) compared to watchful waiting  Note: Watchful waiting is not the same as active surveillance as it is currently practiced    One trial (ProtecT) compared active monitoring, surgery (radical prostatectomy), and radiation therapy  There was no difference in death from prostate cancer at 15 years median follow-up between arms (active monitoring 3.1%, surgery 2.2%, radiation 2.9%, p = 0.48)  An increase in local progression and start of ADT was noted in the active monitoring group compared to the treatment (surgery, radiation) groups  More metastases were observed in the active monitoring group (9.4%) compared to surgery (4.7%) or radiation therapy (5%).6 A secondary analysis, according to treatment received, demonstrated increased incidence of local progression (25.9% vs. 10.5% vs. 11.0%) and initiation of ADT (12.7% vs. 7.2% vs. 7.7%) in the active monitoring group  "compared with surgery and RT.    Treatment choice is guided by:  1. PCa risk stratification  2. Patient life expectancy  3. Assessment of oncologic and quality of life outcomes, and patient preference.   4. Patient preference and the role of \"shared decision making\" in the treatment of localized prostate cancer    AUA guidelines provide the following risk stratification of prostate cancer:  Low risk:    - PSA < 10  - GG1  - AND [id number]       Favorable intermediate risk:  - GG1 with PSA between 10 - 20 or c T2b-c and < 50% biopsy cores positive  - GG2 with PSA < 10 and cT1-T2a and < 50% biopsy cores positive    Unfavorable intermediate risk:  - GG1 with PSA 10-20 and cT2b-c  - GG2 with PSA 10-20 and/or cT2b-c and/or >= 50% of biopsy cores positive   - GG 3 with PSA < 20    High Risk:  - PSA > 20 OR  - GG 4-5 OR  - cT3    Discussed recommendation for PSMA PET versus standard conventional imaging to evaluate for potential metastatic disease in patients with unfavorable intermediate risk or high risk disease.      Routine staging imaging is not recommended in patients with low risk or favorable intermediate risk prostate cancer due to the low risk of metastatic disease    Treatment recommendations stratified by risk category or as follows:    Low risk PCa:  - active surveillance preferred  - radiation or surgery can be considered based on shared-decision making (taking into consideration factors such as patient anxiety regarding cancer diagnosis)    Favorable intermediate risk PCa:  - active surveillance   - radiation   - radical prostatectomy    Unfavorable intermediate risk PCa (if life expectancy > 10 years):  - radical prostatectomy   - radiation with ADT     High risk PCa (if life expectancy > 10 years)  - radical prostatectomy   - radiation with ADT  - if life expectancy < 10 years can consider ADT alone in symptomatic patients      Mr. Vasquez has high risk prostate cancer  Plan will be for PSMA PET scan for " staging imaging      Orders:    NM PET CT skull base to mid thigh; Future    Urine culture    Bladder tumor  Scheduled for TURBT on 1/10/2024    Orders:    Urine culture        History of Present Illness   Aman Vasquez is a 74 y.o. male who presents for results of prostate biopsy       Final Diagnosis   A. Prostate, L Lat Base, needle Biopsy:   - Adenocarcinoma , immunohistochemically compatible with Prostatic adenocarcinoma  -Christine grade  4 +3  = score  7  -Cribriform pattern: present  -Intraductal carcinoma: present  -Tumor involves one submitted core  -Tumor involves approximately 95% of  core biopsy  - Perineural invasion: Not identified        B. Prostate, L Base, needle Biopsy:   - Adenocarcinoma , immunohistochemically compatible with Prostatic adenocarcinoma  -[place] grade 4+ 4 = score  8  -Cribriform pattern: present  -Intraductal carcinoma: present  -Tumor involves one submitted core  -Tumor involves approximately 80% of  core biopsy  - Perineural invasion: Not identified  Note  A small component of grade 5 can not be ruled out      C. Prostate, L Lat Mid, needle Biopsy:   Adenocarcinoma , compatible with Prostatic adenocarcinoma  -[place] grade 4 + 3=  score  7  -Cribriform pattern: present  -Tumor involves one submitted core  -Tumor involves approximately 95% of  core biopsy  - Perineural invasion: Not identified        D. Prostate, L Mid, needle Biopsy:   - Adenocarcinoma , immunohistochemically compatible with Prostatic adenocarcinoma  -[place] grade 4 + 3=  score  7   Cribriform pattern: present  -Tumor involves one submitted core  -Tumor  discontinuously involves approximately 95% of  core biopsy  - Perineural invasion: present        E. Prostate, L Lat [place], needle Biopsy:   Adenocarcinoma , compatible with Prostatic adenocarcinoma  -Christine grade 4 + 3=  score  7  Cribriform pattern: present  -Tumor involves one submitted core  -Tumor  discontinuously involves approximately 95% of  core biopsy  -  Perineural invasion: present        F. Prostate, L [place]abel, eedle Biopsy:   - Adenocarcinoma , immunohistochemically compatible with Prostatic adenocarcinoma  -[place] grade 4 + 3=  score  7   Cribriform pattern: present  -Tumor involves one submitted core  -Tumor  discontinuously involves approximately 90% of  core biopsy  - Perineural invasion: present  Note  A small component of grade 5 can not be ruled out         G. Prostate, R Lat Base, needle Biopsy:   Adenocarcinoma , compatible with Prostatic adenocarcinoma  -[place] grade 4 + 4 =  score  8  Cribriform pattern: present  -Tumor involves one submitted core  -Tumor  discontinuously involves approximately 90% of  core biopsy  - Perineural invasion: present        H. Prostate, R Base,  needle Biopsy:   - Adenocarcinoma , immunohistochemically compatible with Prostatic adenocarcinoma  -[place] grade 4 + 4 =  score  8  Cribriform pattern: present  -Tumor involves one submitted core  -Tumor  discontinuously involves approximately 90% of  core biopsy  - Perineural invasion: present        I. Prostate, R Lat Mid, needle Biopsy:   - Adenocarcinoma , immunohistochemically compatible with Prostatic adenocarcinoma  -Christine grade 4 + 4 =  score  8  Cribriform pattern: not identifed  -Tumor involves one submitted core  -Tumor  discontinuously involves approximately 90% of  core biopsy  - Perineural invasion: not identifed        J. Prostate, R Mid, needle Biopsy:   Adenocarcinoma , compatible with Prostatic adenocarcinoma  -[place] grade 4 + 3=  score  7  -Cribriform Pattern: Not identified  -Tumor involves one submitted core  -Tumor involves approximately 30% of  core biopsy  - Perineural invasion: Not identified        K. Prostate, R Lat [place]abel, needle Biopsy:   Adenocarcinoma , compatible with Prostatic adenocarcinoma  -Christine grade 4 + 3=  score  7  -Cribriform Pattern: Not identified  -Tumor involves one submitted core  -Tumor discontinuously  involves approximately 90%  "of  core biopsy  - Perineural invasion: Not identified        L. Prostate, R [place], needle Biopsy:   Adenocarcinoma , compatible with Prostatic adenocarcinoma  -[place] grade 3 + 3=  score  6  Tumor involves one submitted core  -Tumor discontinuously  involves approximately 60% of  core biopsy  - Perineural invasion: Not identified       Review of Systems   All other systems reviewed and are negative.    Past Medical History   Past Medical History:   Diagnosis Date    Hemorrhoids     Prostate cancer (HCC)      History reviewed. No pertinent surgical history.  Family History   Problem Relation Age of Onset    Cancer Mother     Cancer Father     Cancer Daughter       reports that he has never smoked. He has never used smokeless tobacco. He reports current alcohol use. He reports that he does not use drugs.  Current Outpatient Medications on File Prior to Visit   Medication Sig Dispense Refill    diazepam (VALIUM) 2 mg tablet Take 1 tablet (2 mg total) by mouth 1 (one) time for 1 dose Take prior to prostate biopsy 1 tablet 0     Current Facility-Administered Medications on File Prior to Visit   Medication Dose Route Frequency Provider Last Rate Last Admin    lactated ringers infusion  125 mL/hr Intravenous Continuous Payam Valverde MD        ondansetron (ZOFRAN) injection 4 mg  4 mg Intravenous Once PRN Magdy Kent CRNA        sodium chloride 0.9 % infusion  125 mL/hr Intravenous Continuous Kaur Guerrero  mL/hr at 12/18/24 0851 Restarted at 12/18/24 0916   No Known Allergies      Objective   /76 (BP Location: Left arm, Patient Position: Sitting, Cuff Size: Standard)   Pulse 60   Ht 6' 5\" (1.956 m)   Wt 122 kg (268 lb)   SpO2 97%   BMI 31.78 kg/m²     Physical Exam  Vitals and nursing note reviewed.   Constitutional:       General: He is not in acute distress.     Appearance: He is well-developed.   HENT:      Head: Normocephalic and atraumatic.   Eyes:      Conjunctiva/sclera: Conjunctivae " normal.   Cardiovascular:      Rate and Rhythm: Normal rate and regular rhythm.      Heart sounds: No murmur heard.  Pulmonary:      Effort: Pulmonary effort is normal. No respiratory distress.      Breath sounds: Normal breath sounds.   Abdominal:      Palpations: Abdomen is soft.      Tenderness: There is no abdominal tenderness.   Musculoskeletal:         General: No swelling.      Cervical back: Neck supple.   Skin:     General: Skin is warm and dry.      Capillary Refill: Capillary refill takes less than 2 seconds.   Neurological:      Mental Status: He is alert.   Psychiatric:         Mood and Affect: Mood normal.          Results  Lab Results   Component Value Date    PSA 32.064 (H) 12/02/2024    PSA 22.541 (H) 07/31/2024     Lab Results   Component Value Date    CALCIUM 9.1 12/02/2024    K 4.2 12/02/2024    CO2 22 12/02/2024     (H) 12/02/2024    BUN 18 12/02/2024    CREATININE 0.93 12/02/2024     Lab Results   Component Value Date    WBC 6.52 07/31/2024    HGB 14.5 07/31/2024    HCT 43.6 07/31/2024    MCV 96 07/31/2024     (L) 07/31/2024       Office Urine Dip  No results found for this or any previous visit (from the past hour).]

## 2024-12-20 NOTE — H&P (VIEW-ONLY)
Name: Aman Vasquez      : 1950      MRN: [id number]  Encounter Provider: Memo Falcon MD  Encounter Date: 2024   [facility]  :  Assessment & Plan  Prostate cancer (HCC)  High risk prostate cancer  GG 4, PSA 32, probable ISH on MRI, 1 cm right external iliac lymph node    Discussed with patient his new diagnosis of prostate cancer    Discussed primary management options for prostate cancer are active surveillance, radiation therapy (with or without ADT), and surgery (radical prostatectomy)    Two trials (SPCG-4 and PIVOT) have compared radical prostatectomy to watchful waiting:  SPCG-4 demonstrated an absolute overall survival benefit of 12% (median 2.9 years of life gained) with radical prostatectomy at median 23 years of follow-up  PIVOT demonstrated no difference in overall or disease-specific survival at 12.7 years follow-up, but surgery reduced the risk of progression (40.9% vs 68.4%; HR 0.39, 95% CI 0.32-0.48) and treatment for progression (33.5% vs 59.7%; HR 0.45, 95% CI 0.36-0.56) compared to watchful waiting  Note: Watchful waiting is not the same as active surveillance as it is currently practiced    One trial (ProtecT) compared active monitoring, surgery (radical prostatectomy), and radiation therapy  There was no difference in death from prostate cancer at 15 years median follow-up between arms (active monitoring 3.1%, surgery 2.2%, radiation 2.9%, p = 0.48)  An increase in local progression and start of ADT was noted in the active monitoring group compared to the treatment (surgery, radiation) groups  More metastases were observed in the active monitoring group (9.4%) compared to surgery (4.7%) or radiation therapy (5%).6 A secondary analysis, according to treatment received, demonstrated increased incidence of local progression (25.9% vs. 10.5% vs. 11.0%) and initiation of ADT (12.7% vs. 7.2% vs. 7.7%) in the active monitoring group  "compared with surgery and RT.    Treatment choice is guided by:  1. PCa risk stratification  2. Patient life expectancy  3. Assessment of oncologic and quality of life outcomes, and patient preference.   4. Patient preference and the role of \"shared decision making\" in the treatment of localized prostate cancer    AUA guidelines provide the following risk stratification of prostate cancer:  Low risk:    - PSA < 10  - GG1  - AND [id number]       Favorable intermediate risk:  - GG1 with PSA between 10 - 20 or c T2b-c and < 50% biopsy cores positive  - GG2 with PSA < 10 and cT1-T2a and < 50% biopsy cores positive    Unfavorable intermediate risk:  - GG1 with PSA 10-20 and cT2b-c  - GG2 with PSA 10-20 and/or cT2b-c and/or >= 50% of biopsy cores positive   - GG 3 with PSA < 20    High Risk:  - PSA > 20 OR  - GG 4-5 OR  - cT3    Discussed recommendation for PSMA PET versus standard conventional imaging to evaluate for potential metastatic disease in patients with unfavorable intermediate risk or high risk disease.      Routine staging imaging is not recommended in patients with low risk or favorable intermediate risk prostate cancer due to the low risk of metastatic disease    Treatment recommendations stratified by risk category or as follows:    Low risk PCa:  - active surveillance preferred  - radiation or surgery can be considered based on shared-decision making (taking into consideration factors such as patient anxiety regarding cancer diagnosis)    Favorable intermediate risk PCa:  - active surveillance   - radiation   - radical prostatectomy    Unfavorable intermediate risk PCa (if life expectancy > 10 years):  - radical prostatectomy   - radiation with ADT     High risk PCa (if life expectancy > 10 years)  - radical prostatectomy   - radiation with ADT  - if life expectancy < 10 years can consider ADT alone in symptomatic patients      Mr. Vasquez has high risk prostate cancer  Plan will be for PSMA PET scan for " staging imaging      Orders:    NM PET CT skull base to mid thigh; Future    Urine culture    Bladder tumor  Scheduled for TURBT on 1/10/2024    Orders:    Urine culture        History of Present Illness   Aman Vasquez is a 74 y.o. male who presents for results of prostate biopsy       Final Diagnosis   A. Prostate, L Lat Base, needle Biopsy:   - Adenocarcinoma , immunohistochemically compatible with Prostatic adenocarcinoma  -[place]ine grade  4 +3  = score  7  -Cribriform pattern: present  -Intraductal carcinoma: present  -Tumor involves one submitted core  -Tumor involves approximately 95% of  core biopsy  - Perineural invasion: Not identified        B. Prostate, L Base, needle Biopsy:   - Adenocarcinoma , immunohistochemically compatible with Prostatic adenocarcinoma  -Davis grade 4+ 4 = score  8  -Cribriform pattern: present  -Intraductal carcinoma: present  -Tumor involves one submitted core  -Tumor involves approximately 80% of  core biopsy  - Perineural invasion: Not identified  Note  A small component of grade 5 can not be ruled out      C. Prostate, L Lat Mid, needle Biopsy:   Adenocarcinoma , compatible with Prostatic adenocarcinoma  -Davis grade 4 + 3=  score  7  -Cribriform pattern: present  -Tumor involves one submitted core  -Tumor involves approximately 95% of  core biopsy  - Perineural invasion: Not identified        D. Prostate, L Mid, needle Biopsy:   - Adenocarcinoma , immunohistochemically compatible with Prostatic adenocarcinoma  -Davis grade 4 + 3=  score  7   Cribriform pattern: present  -Tumor involves one submitted core  -Tumor  discontinuously involves approximately 95% of  core biopsy  - Perineural invasion: present        E. Prostate, L Lat [place], needle Biopsy:   Adenocarcinoma , compatible with Prostatic adenocarcinoma  -Christine grade 4 + 3=  score  7  Cribriform pattern: present  -Tumor involves one submitted core  -Tumor  discontinuously involves approximately 95% of  core biopsy  -  Perineural invasion: present        F. Prostate, L [place], eedle Biopsy:   - Adenocarcinoma , immunohistochemically compatible with Prostatic adenocarcinoma  -[place] grade 4 + 3=  score  7   Cribriform pattern: present  -Tumor involves one submitted core  -Tumor  discontinuously involves approximately 90% of  core biopsy  - Perineural invasion: present  Note  A small component of grade 5 can not be ruled out         G. Prostate, R Lat Base, needle Biopsy:   Adenocarcinoma , compatible with Prostatic adenocarcinoma  -[place] grade 4 + 4 =  score  8  Cribriform pattern: present  -Tumor involves one submitted core  -Tumor  discontinuously involves approximately 90% of  core biopsy  - Perineural invasion: present        H. Prostate, R Base,  needle Biopsy:   - Adenocarcinoma , immunohistochemically compatible with Prostatic adenocarcinoma  -[place] grade 4 + 4 =  score  8  Cribriform pattern: present  -Tumor involves one submitted core  -Tumor  discontinuously involves approximately 90% of  core biopsy  - Perineural invasion: present        I. Prostate, R Lat Mid, needle Biopsy:   - Adenocarcinoma , immunohistochemically compatible with Prostatic adenocarcinoma  -Christine grade 4 + 4 =  score  8  Cribriform pattern: not identifed  -Tumor involves one submitted core  -Tumor  discontinuously involves approximately 90% of  core biopsy  - Perineural invasion: not identifed        J. Prostate, R Mid, needle Biopsy:   Adenocarcinoma , compatible with Prostatic adenocarcinoma  -[place] grade 4 + 3=  score  7  -Cribriform Pattern: Not identified  -Tumor involves one submitted core  -Tumor involves approximately 30% of  core biopsy  - Perineural invasion: Not identified        K. Prostate, R Lat [place], needle Biopsy:   Adenocarcinoma , compatible with Prostatic adenocarcinoma  -Christine grade 4 + 3=  score  7  -Cribriform Pattern: Not identified  -Tumor involves one submitted core  -Tumor discontinuously  involves approximately 90%  "of  core biopsy  - Perineural invasion: Not identified        L. Prostate, R [place], needle Biopsy:   Adenocarcinoma , compatible with Prostatic adenocarcinoma  -[place] grade 3 + 3=  score  6  Tumor involves one submitted core  -Tumor discontinuously  involves approximately 60% of  core biopsy  - Perineural invasion: Not identified       Review of Systems   All other systems reviewed and are negative.    Past Medical History   Past Medical History:   Diagnosis Date    Hemorrhoids     Prostate cancer (HCC)      History reviewed. No pertinent surgical history.  Family History   Problem Relation Age of Onset    Cancer Mother     Cancer Father     Cancer Daughter       reports that he has never smoked. He has never used smokeless tobacco. He reports current alcohol use. He reports that he does not use drugs.  Current Outpatient Medications on File Prior to Visit   Medication Sig Dispense Refill    diazepam (VALIUM) 2 mg tablet Take 1 tablet (2 mg total) by mouth 1 (one) time for 1 dose Take prior to prostate biopsy 1 tablet 0     Current Facility-Administered Medications on File Prior to Visit   Medication Dose Route Frequency Provider Last Rate Last Admin    lactated ringers infusion  125 mL/hr Intravenous Continuous Payam Valverde MD        ondansetron (ZOFRAN) injection 4 mg  4 mg Intravenous Once PRN Magdy Kent CRNA        sodium chloride 0.9 % infusion  125 mL/hr Intravenous Continuous Kaur Guerrero  mL/hr at 12/18/24 0851 Restarted at 12/18/24 0916   No Known Allergies      Objective   /76 (BP Location: Left arm, Patient Position: Sitting, Cuff Size: Standard)   Pulse 60   Ht 6' 5\" (1.956 m)   Wt 122 kg (268 lb)   SpO2 97%   BMI 31.78 kg/m²     Physical Exam  Vitals and nursing note reviewed.   Constitutional:       General: He is not in acute distress.     Appearance: He is well-developed.   HENT:      Head: Normocephalic and atraumatic.   Eyes:      Conjunctiva/sclera: Conjunctivae " normal.   Cardiovascular:      Rate and Rhythm: Normal rate and regular rhythm.      Heart sounds: No murmur heard.  Pulmonary:      Effort: Pulmonary effort is normal. No respiratory distress.      Breath sounds: Normal breath sounds.   Abdominal:      Palpations: Abdomen is soft.      Tenderness: There is no abdominal tenderness.   Musculoskeletal:         General: No swelling.      Cervical back: Neck supple.   Skin:     General: Skin is warm and dry.      Capillary Refill: Capillary refill takes less than 2 seconds.   Neurological:      Mental Status: He is alert.   Psychiatric:         Mood and Affect: Mood normal.          Results  Lab Results   Component Value Date    PSA 32.064 (H) 12/02/2024    PSA 22.541 (H) 07/31/2024     Lab Results   Component Value Date    CALCIUM 9.1 12/02/2024    K 4.2 12/02/2024    CO2 22 12/02/2024     (H) 12/02/2024    BUN 18 12/02/2024    CREATININE 0.93 12/02/2024     Lab Results   Component Value Date    WBC 6.52 07/31/2024    HGB 14.5 07/31/2024    HCT 43.6 07/31/2024    MCV 96 07/31/2024     (L) 07/31/2024       Office Urine Dip  No results found for this or any previous visit (from the past hour).]

## 2024-12-23 ENCOUNTER — OFFICE VISIT (OUTPATIENT)
Dept: DERMATOLOGY | Facility: CLINIC | Age: 74
End: 2024-12-23
Payer: COMMERCIAL

## 2024-12-23 VITALS — WEIGHT: 268 LBS | TEMPERATURE: 97.1 F | BODY MASS INDEX: 31.78 KG/M2

## 2024-12-23 DIAGNOSIS — R21 RASH: Primary | ICD-10-CM

## 2024-12-23 DIAGNOSIS — L03.312 CELLULITIS OF BACK: ICD-10-CM

## 2024-12-23 PROCEDURE — 88312 SPECIAL STAINS GROUP 1: CPT | Performed by: STUDENT IN AN ORGANIZED HEALTH CARE EDUCATION/TRAINING PROGRAM

## 2024-12-23 PROCEDURE — 88305 TISSUE EXAM BY PATHOLOGIST: CPT | Performed by: STUDENT IN AN ORGANIZED HEALTH CARE EDUCATION/TRAINING PROGRAM

## 2024-12-23 PROCEDURE — 99204 OFFICE O/P NEW MOD 45 MIN: CPT | Performed by: REGISTERED NURSE

## 2024-12-23 PROCEDURE — 11104 PUNCH BX SKIN SINGLE LESION: CPT | Performed by: REGISTERED NURSE

## 2024-12-23 PROCEDURE — 88313 SPECIAL STAINS GROUP 2: CPT | Performed by: STUDENT IN AN ORGANIZED HEALTH CARE EDUCATION/TRAINING PROGRAM

## 2024-12-23 RX ORDER — MUPIROCIN 20 MG/G
OINTMENT TOPICAL
Qty: 30 G | Refills: 0 | Status: SHIPPED | OUTPATIENT
Start: 2024-12-23

## 2024-12-23 NOTE — PROGRESS NOTES
"[facility]atology Clinic Note     Patient Name: Aman Vasquez  Encounter Date: 12/23/24     Have you been cared for by a [facility] Dermatologist in the last 3 years and, if so, which description applies to you?    NO.   I am considered a \"new\" patient and must complete all patient intake questions. I am MALE/not capable of bearing children.    REVIEW OF SYSTEMS:  Have you recently had or currently have any of the following? Recent fever or chills? No  Any non-healing wound? No   PAST MEDICAL HISTORY:  Have you personally ever had or currently have any of the following?  If \"YES,\" then please provide more detail. Skin cancer (such as Melanoma, Basal Cell Carcinoma, Squamous Cell Carcinoma?  No  Tuberculosis, HIV/AIDS, Hepatitis B or C: No  Radiation Treatment No   HISTORY OF IMMUNOSUPPRESSION:   Do you have a history of any of the following:  Systemic Immunosuppression such as Diabetes, Biologic or Immunotherapy, Chemotherapy, Organ Transplantation, Bone Marrow Transplantation or Prednisone?  No     Answering \"YES\" requires the addition of the dotphrase \"IMMUNOSUPPRESSED\" as the first diagnosis of the patient's visit.   FAMILY HISTORY:  Any \"first degree relatives\" (parent, brother, sister, or child) with the following?    Skin Cancer, Pancreatic or Other Cancer? No   PATIENT EXPERIENCE:    Do you want the Dermatologist to perform a COMPLETE skin exam today including a clinical examination under the \"bra and underwear\" areas?  NO  If necessary, do we have your permission to call and leave a detailed message on your Preferred Phone number that includes your specific medical information?  Yes      No Known Allergies   Current Outpatient Medications:     diazepam (VALIUM) 2 mg tablet, Take 1 tablet (2 mg total) by mouth 1 (one) time for 1 dose Take prior to prostate biopsy, Disp: 1 tablet, Rfl: 0          Whom besides the patient is providing clinical information about today's encounter?   NO ADDITIONAL HISTORIAN " "(patient alone provided history)    Physical Exam and Assessment/Plan by Diagnosis:  NEOPLASM OF UNCERTAIN BEHAVIOR OF SKIN    Physical Exam:  (Anatomic Location); (Size and Morphological Description); (Differential Diagnosis):  Specimen A: upper mid back; thin eroded plaque; ddx: Impetigo vs. Ecthyma vs. Contact Dermatitis vs. Superficial PG   Pertinent Positives: intermittent tenderness  Pertinent Negatives:    Additional History of Present Condition:  Patient states that he's had a rash on the back for about a month now. Rash was evaluated by PCP and was prescribed antibiotics including keflex. No significant improvement and so he started applying hydrocortisone about 2 days ago. No spreading. Denies similar lesion anywhere else on the body.     Assessment and Plan:  I have discussed with the patient that a sample of skin via a \"skin biopsy” would be potentially helpful to further make a specific diagnosis under the microscope.  Based on a thorough discussion of this condition and the management approach to it (including a comprehensive discussion of the known risks, side effects and potential benefits of treatment), the patient (family) agrees to implement the following specific plan:    Procedure:  Skin Biopsy.  After a thorough discussion of treatment options and risk/benefits/alternatives (including but not limited to local pain, scarring, dyspigmentation, blistering, possible superinfection, and inability to confirm a diagnosis via histopathology), verbal and written consent were obtained and portion of the rash was biopsied for tissue sample.  See below for consent that was obtained from patient and subsequent Procedure Note.  Also prescribing mupirocin 2%  ointment to apply two a day for two weeks   Rest of management will be based on clinicopathologic diagnosis   PROCEDURE NOTE:  PUNCH BIOPSY      Performing Physician:  Dr.Ansah- Paniagua    Anatomic Location; Clinical Description with size (cm); Pre-Op " Diagnosis:    Specimen A: upper mid back; thin eroded plaque; ddx: Impetigo vs. Ecthyma vs. Contact Dermatitis vs. Superficial PG         Anesthesia: 1% xylocaine with epi       Topical anesthesia: None       Indications: To indicate diagnosis and management plan.    Procedure Details     Patient informed of the risks (including bleeding,scaring and infection) and benefits of the procedure explained. Verbal and written informed consent obtained. The area was prepped and draped in the usual fashion. Anesthesia was obtained with 1% lidocaine with epinephrine. The skin was then stretched perpendicular to the skin tension lines and a punch biopsy to an appropriate sampling depth was obtained with a 5 mm punch with a forceps and iris scissors.     Hemostasis was obtained with 4-0 Prolene x 2 sutures.     Complications:  None      Specimen has been sent for review by Dermatopathology.      Plan:  1. Instructed to keep the wound dry and covered for 24-48h and clean thereafter.  2. Warning signs of infection were reviewed.    3. Recommended that the patient use acetaminophen as needed for pain  4. Sutures if any should be removed in 14 days      Standard post-procedure care has been explained and has been included in written form within the patient's copy of Informed Consent.          Scribe Attestation      I,:  Radha Mckeon MA am acting as a scribe while in the presence of the attending physician.:       I,:  Joe Robertson MD personally performed the services described in this documentation    as scribed in my presence.:

## 2024-12-27 ENCOUNTER — APPOINTMENT (OUTPATIENT)
Dept: LAB | Facility: HOSPITAL | Age: 74
End: 2024-12-27
Payer: COMMERCIAL

## 2024-12-27 ENCOUNTER — APPOINTMENT (OUTPATIENT)
Dept: LAB | Facility: HOSPITAL | Age: 74
End: 2024-12-27
Payer: OTHER GOVERNMENT

## 2024-12-27 DIAGNOSIS — Z01.812 PRE-OPERATIVE LABORATORY EXAMINATION: ICD-10-CM

## 2024-12-27 DIAGNOSIS — N32.89 BLADDER MASS: ICD-10-CM

## 2024-12-27 DIAGNOSIS — Z01.810 PRE-OPERATIVE CARDIOVASCULAR EXAMINATION: ICD-10-CM

## 2024-12-27 DIAGNOSIS — R39.89 SUSPECTED UTI: ICD-10-CM

## 2024-12-27 LAB
ATRIAL RATE: 64 BPM
BASOPHILS # BLD AUTO: 0.01 THOUSANDS/ÂΜL (ref 0–0.1)
BASOPHILS NFR BLD AUTO: 0 % (ref 0–1)
EOSINOPHIL # BLD AUTO: 0.11 THOUSAND/ÂΜL (ref 0–0.61)
EOSINOPHIL NFR BLD AUTO: 2 % (ref 0–6)
ERYTHROCYTE [DISTWIDTH] IN BLOOD BY AUTOMATED COUNT: 13.8 % (ref 11.6–15.1)
HCT VFR BLD AUTO: 44 % (ref 36.5–49.3)
HGB BLD-MCNC: 14.4 G/DL (ref 12–17)
IMM GRANULOCYTES # BLD AUTO: 0.02 THOUSAND/UL (ref 0–0.2)
IMM GRANULOCYTES NFR BLD AUTO: 0 % (ref 0–2)
LYMPHOCYTES # BLD AUTO: 1.66 THOUSANDS/ÂΜL (ref 0.6–4.47)
LYMPHOCYTES NFR BLD AUTO: 28 % (ref 14–44)
MCH RBC QN AUTO: 31.8 PG (ref 26.8–34.3)
MCHC RBC AUTO-ENTMCNC: 32.7 G/DL (ref 31.4–37.4)
MCV RBC AUTO: 97 FL (ref 82–98)
MONOCYTES # BLD AUTO: 0.52 THOUSAND/ÂΜL (ref 0.17–1.22)
MONOCYTES NFR BLD AUTO: 9 % (ref 4–12)
NEUTROPHILS # BLD AUTO: 3.68 THOUSANDS/ÂΜL (ref 1.85–7.62)
NEUTS SEG NFR BLD AUTO: 61 % (ref 43–75)
NRBC BLD AUTO-RTO: 0 /100 WBCS
PLATELET # BLD AUTO: 145 THOUSANDS/UL (ref 149–390)
PMV BLD AUTO: 9.8 FL (ref 8.9–12.7)
PR INTERVAL: 192 MS
QRS AXIS: 184 DEGREES
QRSD INTERVAL: 130 MS
QT INTERVAL: 424 MS
QTC INTERVAL: 438 MS
RBC # BLD AUTO: 4.53 MILLION/UL (ref 3.88–5.62)
T WAVE AXIS: 116 DEGREES
VENTRICULAR RATE: 64 BPM
WBC # BLD AUTO: 6 THOUSAND/UL (ref 4.31–10.16)

## 2024-12-27 PROCEDURE — 88312 SPECIAL STAINS GROUP 1: CPT | Performed by: STUDENT IN AN ORGANIZED HEALTH CARE EDUCATION/TRAINING PROGRAM

## 2024-12-27 PROCEDURE — 85025 COMPLETE CBC W/AUTO DIFF WBC: CPT

## 2024-12-27 PROCEDURE — 88313 SPECIAL STAINS GROUP 2: CPT | Performed by: STUDENT IN AN ORGANIZED HEALTH CARE EDUCATION/TRAINING PROGRAM

## 2024-12-27 PROCEDURE — 88305 TISSUE EXAM BY PATHOLOGIST: CPT | Performed by: STUDENT IN AN ORGANIZED HEALTH CARE EDUCATION/TRAINING PROGRAM

## 2024-12-27 PROCEDURE — 93010 ELECTROCARDIOGRAM REPORT: CPT | Performed by: STUDENT IN AN ORGANIZED HEALTH CARE EDUCATION/TRAINING PROGRAM

## 2024-12-27 PROCEDURE — 36415 COLL VENOUS BLD VENIPUNCTURE: CPT

## 2024-12-27 PROCEDURE — 87086 URINE CULTURE/COLONY COUNT: CPT

## 2024-12-28 LAB — BACTERIA UR CULT: NORMAL

## 2024-12-30 PROCEDURE — 88305 TISSUE EXAM BY PATHOLOGIST: CPT | Performed by: PATHOLOGY

## 2024-12-31 ENCOUNTER — HOSPITAL ENCOUNTER (OUTPATIENT)
Dept: NUCLEAR MEDICINE | Facility: HOSPITAL | Age: 74
Discharge: HOME | End: 2024-12-31
Attending: UROLOGY
Payer: COMMERCIAL

## 2024-12-31 DIAGNOSIS — C61 PROSTATE CANCER (HCC): ICD-10-CM

## 2024-12-31 PROCEDURE — 78815 PET IMAGE W/CT SKULL-THIGH: CPT

## 2024-12-31 PROCEDURE — A9596 HB ILLUCCIX - GA 68 PSMA -11, PER MCI: HCPCS

## 2025-01-03 ENCOUNTER — RESULTS FOLLOW-UP (OUTPATIENT)
Age: 75
End: 2025-01-03

## 2025-01-03 NOTE — RESULT ENCOUNTER NOTE
DERMATOPATHOLOGY RESULT NOTE    Results reviewed by ordering physician.  Called patient to personally discuss results. Discussed results with patient.       Instructions for Clinical Derm Team:   (remember to route Result Note to appropriate staff):    None    Result & Plan by Specimen:    Specimen A: benign, non specific histology that can be suggestive of trauma   Plan:   - Discussed results with patient, he's not sure if lesion is healing because he cannot see his back and doesn't have anyone at home who can help him with that. He does however says he feels it is getting crusty.   - He'll have suture removal on 1/7/25 and have asked to have a picture of site taken for me to evaluate.   - Will plan accordingly whether lesion is improving or not.   - Advised to continue to apply mupirocin ointment twice daily       A. Skin, upper mid back, punch biopsy:    Ulceration, superficial small-vessel ectasia, and superficial dermal lymphocytic inflammatory infiltrate with scattered neutrophils (see note).    Note: The histopathologic findings are non-specific, though findings suggestive of trauma/irritation are present. Findings diagnostic of a neutrophilic dermatosis (e.g., pyoderma gangrenosum) are not appreciated (though the diagnosis cannot be fully excluded based upon these histopathologic findings alone). Clinical pathologic correlation is essential. Pathogenic microorganisms are not seen with PAS and Gram stains. Multiple levels examined. If the rash were to progress/persist despite therapy, additional sampling should be sought to exclude development of a more significant disease.      Electronically signed by Becky Ko MD on 12/27/2024 at 0918 EST

## 2025-01-07 ENCOUNTER — CLINICAL SUPPORT (OUTPATIENT)
Dept: DERMATOLOGY | Facility: CLINIC | Age: 75
End: 2025-01-07

## 2025-01-07 ENCOUNTER — TELEPHONE (OUTPATIENT)
Age: 75
End: 2025-01-07

## 2025-01-07 DIAGNOSIS — Z48.02 ENCOUNTER FOR REMOVAL OF SUTURES: Primary | ICD-10-CM

## 2025-01-07 DIAGNOSIS — L98.9 SKIN LESION: Primary | ICD-10-CM

## 2025-01-07 PROCEDURE — RECHECK: Performed by: DERMATOLOGY

## 2025-01-07 RX ORDER — TRIAMCINOLONE ACETONIDE 1 MG/G
OINTMENT TOPICAL 2 TIMES DAILY
Qty: 80 G | Refills: 2 | Status: SHIPPED | OUTPATIENT
Start: 2025-01-07

## 2025-01-07 NOTE — PROGRESS NOTES
Pictures took during suture removal evaluated. Lesion appears to have healed, no ulceration present. However there's erythema and some scale. Patient reports that it is less pruritic than before the biopsy. Prescribed TMC 0.1% ointment to be applied to area twice daily for 2 weeks and then taper to 3 times a week as needed for maintenance. Will also schedule him to come back for re evaluation in 2-3 months time. Patient advised to reach out to us for further evaluation should lesion start to ulcerate, bleed, increasing pruritus and or tenderness.

## 2025-01-07 NOTE — PROGRESS NOTES
"Suture removal    Date/Time: 1/7/2025 9:30 AM    Performed by: Dory Mccarty RN  Authorized by: Dena Boateng MD  Universal Protocol:  procedure performed by consultantConsent: Verbal consent obtained. Written consent not obtained.  Risks and benefits: risks, benefits and alternatives were discussed  Consent given by: patient  Time out: Immediately prior to procedure a \"time out\" was called to verify the correct patient, procedure, equipment, support staff and site/side marked as required.  Timeout called at: 1/7/2025 9:14 AM.  Patient understanding: patient states understanding of the procedure being performed  Patient consent: the patient's understanding of the procedure matches consent given  Procedure consent: procedure consent matches procedure scheduled  Relevant documents: relevant documents not present or verified  Test results: test results not available  Site marked: the operative site was not marked  Radiology Images displayed and confirmed. If images not available, report reviewed: imaging studies not available  Patient identity confirmed: verbally with patient      Patient location:  Clinic  Location:     Location:  Trunk    Trunk location:  Back (upper mid back)  Procedure details:     Tools used:  Suture removal kit    Wound appearance:  No sign(s) of infection, good wound healing, clean, moist and pink  Post-procedure details:     Post-removal:  Band-Aid applied (Vaseline applied)    Patient tolerance of procedure:  Tolerated well, no immediate complications      Before suture removal     After suture removal   "

## 2025-01-07 NOTE — TELEPHONE ENCOUNTER
LVM for pt regarding inbasket from Dr Robertson to get pt scheduled with him for a follow up appointment in 2-3 months time. Advised pt to callback to schedule this appointment with us at his earliest convenience.

## 2025-01-08 ENCOUNTER — ANESTHESIA EVENT (OUTPATIENT)
Dept: PERIOP | Facility: HOSPITAL | Age: 75
End: 2025-01-08
Payer: COMMERCIAL

## 2025-01-08 NOTE — PRE-PROCEDURE INSTRUCTIONS
Pre-Surgery Instructions:   Medication Instructions    mupirocin (BACTROBAN) 2 % ointment Hold day of surgery.    triamcinolone (KENALOG) 0.1 % ointment Hold day of surgery.   Medication instructions for day surgery reviewed. Please use only a sip of water to take your instructed medications. Avoid all over the counter vitamins, supplements and NSAIDS for one week prior to surgery per anesthesia guidelines. Tylenol is ok to take as needed.     You will receive a call one business day prior to surgery with an arrival time and hospital directions. If your surgery is scheduled on a Monday, the hospital will be calling you on the Friday prior to your surgery. If you have not heard from anyone by 8pm, please call the hospital supervisor through the hospital  at [phone]. ([place] [phone] or [facility] [phone]).    Do not eat or drink anything after midnight the night before your surgery, including candy, mints, lifesavers, or chewing gum. Do not drink alcohol 24hrs before your surgery. Try not to smoke at least 24hrs before your surgery.       Follow the pre surgery showering instructions as listed in the “My Surgical Experience Booklet” or otherwise provided by your surgeon's office. Do not use a blade to shave the surgical area 1 week before surgery. It is okay to use a clean electric clippers up to 24 hours before surgery. Do not apply any lotions, creams, including makeup, cologne, deodorant, or perfumes after showering on the day of your surgery. Do not use dry shampoo, hair spray, hair gel, or any type of hair products.     No contact lenses, eye make-up, or artificial eyelashes. Remove nail polish, including gel polish, and any artificial, gel, or acrylic nails if possible. Remove all jewelry including rings and body piercing jewelry.     Wear causal clothing that is easy to take on and off. Consider your type of surgery.    Keep any valuables, jewelry, piercings at home. Please bring any  specially ordered equipment (sling, braces) if indicated.    Arrange for a responsible person to drive you to and from the hospital on the day of your surgery. Please confirm the visitor policy for the day of your procedure when you receive your phone call with an arrival time.     Call the surgeon's office with any new illnesses, exposures, or additional questions prior to surgery.    Please reference your “My Surgical Experience Booklet” for additional information to prepare for your upcoming surgery.

## 2025-01-10 ENCOUNTER — PATIENT OUTREACH (OUTPATIENT)
Dept: HEMATOLOGY ONCOLOGY | Facility: CLINIC | Age: 75
End: 2025-01-10

## 2025-01-10 ENCOUNTER — DOCUMENTATION (OUTPATIENT)
Dept: HEMATOLOGY ONCOLOGY | Facility: CLINIC | Age: 75
End: 2025-01-10

## 2025-01-10 ENCOUNTER — TELEPHONE (OUTPATIENT)
Dept: UROLOGY | Facility: MEDICAL CENTER | Age: 75
End: 2025-01-10

## 2025-01-10 ENCOUNTER — HOSPITAL ENCOUNTER (OUTPATIENT)
Facility: HOSPITAL | Age: 75
Setting detail: OUTPATIENT SURGERY
Discharge: HOME | End: 2025-01-10
Attending: UROLOGY | Admitting: UROLOGY
Payer: COMMERCIAL

## 2025-01-10 ENCOUNTER — ANESTHESIA (OUTPATIENT)
Dept: PERIOP | Facility: HOSPITAL | Age: 75
End: 2025-01-10
Payer: COMMERCIAL

## 2025-01-10 VITALS
BODY MASS INDEX: 30.25 KG/M2 | DIASTOLIC BLOOD PRESSURE: 89 MMHG | RESPIRATION RATE: 18 BRPM | TEMPERATURE: 97.6 F | WEIGHT: 256.17 LBS | HEIGHT: 77 IN | OXYGEN SATURATION: 97 % | HEART RATE: 68 BPM | SYSTOLIC BLOOD PRESSURE: 138 MMHG

## 2025-01-10 DIAGNOSIS — C61 PROSTATE CANCER (HCC): Primary | ICD-10-CM

## 2025-01-10 DIAGNOSIS — N32.89 BLADDER MASS: ICD-10-CM

## 2025-01-10 PROCEDURE — 88342 IMHCHEM/IMCYTCHM 1ST ANTB: CPT | Performed by: PATHOLOGY

## 2025-01-10 PROCEDURE — 52234 CYSTOSCOPY AND TREATMENT: CPT | Performed by: UROLOGY

## 2025-01-10 PROCEDURE — 88307 TISSUE EXAM BY PATHOLOGIST: CPT | Performed by: PATHOLOGY

## 2025-01-10 PROCEDURE — 88341 IMHCHEM/IMCYTCHM EA ADD ANTB: CPT | Performed by: PATHOLOGY

## 2025-01-10 RX ORDER — FENTANYL CITRATE/PF 50 MCG/ML
25 SYRINGE (ML) INJECTION
Status: DISCONTINUED | OUTPATIENT
Start: 2025-01-10 | End: 2025-01-10

## 2025-01-10 RX ORDER — LIDOCAINE HYDROCHLORIDE 20 MG/ML
JELLY TOPICAL AS NEEDED
Status: DISCONTINUED | OUTPATIENT
Start: 2025-01-10 | End: 2025-01-10 | Stop reason: HOSPADM

## 2025-01-10 RX ORDER — HYDROCODONE BITARTRATE AND ACETAMINOPHEN 5; 325 MG/1; MG/1
1 TABLET ORAL EVERY 6 HOURS PRN
Status: DISCONTINUED | OUTPATIENT
Start: 2025-01-10 | End: 2025-01-10 | Stop reason: HOSPADM

## 2025-01-10 RX ORDER — SODIUM CHLORIDE 9 MG/ML
125 INJECTION, SOLUTION INTRAVENOUS CONTINUOUS
Status: DISCONTINUED | OUTPATIENT
Start: 2025-01-10 | End: 2025-01-10 | Stop reason: HOSPADM

## 2025-01-10 RX ORDER — ONDANSETRON 2 MG/ML
4 INJECTION INTRAMUSCULAR; INTRAVENOUS ONCE AS NEEDED
Status: DISCONTINUED | OUTPATIENT
Start: 2025-01-10 | End: 2025-01-10

## 2025-01-10 RX ORDER — PHENAZOPYRIDINE HYDROCHLORIDE 200 MG/1
200 TABLET, FILM COATED ORAL
Qty: 10 TABLET | Refills: 0 | Status: SHIPPED | OUTPATIENT
Start: 2025-01-10

## 2025-01-10 RX ORDER — IBUPROFEN 600 MG/1
600 TABLET, FILM COATED ORAL EVERY 8 HOURS PRN
Qty: 30 TABLET | Refills: 0 | Status: SHIPPED | OUTPATIENT
Start: 2025-01-10

## 2025-01-10 RX ORDER — PHENAZOPYRIDINE HYDROCHLORIDE 100 MG/1
100 TABLET, FILM COATED ORAL
Status: DISCONTINUED | OUTPATIENT
Start: 2025-01-10 | End: 2025-01-10 | Stop reason: HOSPADM

## 2025-01-10 RX ORDER — ONDANSETRON 2 MG/ML
INJECTION INTRAMUSCULAR; INTRAVENOUS AS NEEDED
Status: DISCONTINUED | OUTPATIENT
Start: 2025-01-10 | End: 2025-01-10

## 2025-01-10 RX ORDER — PROPOFOL 10 MG/ML
INJECTION, EMULSION INTRAVENOUS AS NEEDED
Status: DISCONTINUED | OUTPATIENT
Start: 2025-01-10 | End: 2025-01-10

## 2025-01-10 RX ORDER — CEFAZOLIN SODIUM 2 G/50ML
2000 SOLUTION INTRAVENOUS ONCE
Status: COMPLETED | OUTPATIENT
Start: 2025-01-10 | End: 2025-01-10

## 2025-01-10 RX ORDER — OXYBUTYNIN CHLORIDE 5 MG/1
5 TABLET ORAL 3 TIMES DAILY
Status: DISCONTINUED | OUTPATIENT
Start: 2025-01-10 | End: 2025-01-10 | Stop reason: HOSPADM

## 2025-01-10 RX ORDER — OXYBUTYNIN CHLORIDE 10 MG/1
10 TABLET, EXTENDED RELEASE ORAL
Qty: 5 TABLET | Refills: 0 | Status: SHIPPED | OUTPATIENT
Start: 2025-01-10

## 2025-01-10 RX ORDER — MAGNESIUM HYDROXIDE 1200 MG/15ML
LIQUID ORAL AS NEEDED
Status: DISCONTINUED | OUTPATIENT
Start: 2025-01-10 | End: 2025-01-10 | Stop reason: HOSPADM

## 2025-01-10 RX ORDER — CEPHALEXIN 500 MG/1
500 CAPSULE ORAL EVERY 12 HOURS SCHEDULED
Qty: 6 CAPSULE | Refills: 0 | Status: SHIPPED | OUTPATIENT
Start: 2025-01-10 | End: 2025-01-13

## 2025-01-10 RX ORDER — LIDOCAINE HYDROCHLORIDE 20 MG/ML
INJECTION, SOLUTION EPIDURAL; INFILTRATION; INTRACAUDAL; PERINEURAL AS NEEDED
Status: DISCONTINUED | OUTPATIENT
Start: 2025-01-10 | End: 2025-01-10

## 2025-01-10 RX ORDER — GLYCOPYRROLATE 0.2 MG/ML
INJECTION INTRAMUSCULAR; INTRAVENOUS AS NEEDED
Status: DISCONTINUED | OUTPATIENT
Start: 2025-01-10 | End: 2025-01-10

## 2025-01-10 RX ORDER — FENTANYL CITRATE 50 UG/ML
INJECTION, SOLUTION INTRAMUSCULAR; INTRAVENOUS AS NEEDED
Status: DISCONTINUED | OUTPATIENT
Start: 2025-01-10 | End: 2025-01-10

## 2025-01-10 RX ORDER — ROCURONIUM BROMIDE 10 MG/ML
INJECTION, SOLUTION INTRAVENOUS AS NEEDED
Status: DISCONTINUED | OUTPATIENT
Start: 2025-01-10 | End: 2025-01-10

## 2025-01-10 RX ADMIN — FENTANYL CITRATE 50 MCG: 50 INJECTION INTRAMUSCULAR; INTRAVENOUS at 12:25

## 2025-01-10 RX ADMIN — SUGAMMADEX 200 MG: 100 INJECTION, SOLUTION INTRAVENOUS at 12:51

## 2025-01-10 RX ADMIN — GLYCOPYRROLATE 0.2 MG: 0.2 INJECTION, SOLUTION INTRAMUSCULAR; INTRAVENOUS at 12:10

## 2025-01-10 RX ADMIN — PHENAZOPYRIDINE 100 MG: 100 TABLET ORAL at 15:18

## 2025-01-10 RX ADMIN — FENTANYL CITRATE 25 MCG: 50 INJECTION INTRAMUSCULAR; INTRAVENOUS at 14:01

## 2025-01-10 RX ADMIN — OXYBUTYNIN CHLORIDE 5 MG: 5 TABLET ORAL at 15:18

## 2025-01-10 RX ADMIN — PROPOFOL 150 MG: 10 INJECTION, EMULSION INTRAVENOUS at 12:01

## 2025-01-10 RX ADMIN — GEMCITABINE HYDROCHLORIDE 2000 MG: 1 INJECTION, SOLUTION INTRAVENOUS at 13:09

## 2025-01-10 RX ADMIN — LIDOCAINE HYDROCHLORIDE 100 MG: 20 INJECTION, SOLUTION EPIDURAL; INFILTRATION; INTRACAUDAL at 12:00

## 2025-01-10 RX ADMIN — ROCURONIUM 40 MG: 50 INJECTION, SOLUTION INTRAVENOUS at 12:02

## 2025-01-10 RX ADMIN — ONDANSETRON 4 MG: 2 INJECTION INTRAMUSCULAR; INTRAVENOUS at 12:51

## 2025-01-10 RX ADMIN — SODIUM CHLORIDE 125 ML/HR: 0.9 INJECTION, SOLUTION INTRAVENOUS at 10:10

## 2025-01-10 RX ADMIN — FENTANYL CITRATE 50 MCG: 50 INJECTION INTRAMUSCULAR; INTRAVENOUS at 12:11

## 2025-01-10 RX ADMIN — GLYCOPYRROLATE 0.1 MG: 0.2 INJECTION, SOLUTION INTRAMUSCULAR; INTRAVENOUS at 12:38

## 2025-01-10 RX ADMIN — FENTANYL CITRATE 25 MCG: 50 INJECTION INTRAMUSCULAR; INTRAVENOUS at 13:51

## 2025-01-10 RX ADMIN — CEFAZOLIN SODIUM 2000 MG: 2 SOLUTION INTRAVENOUS at 11:59

## 2025-01-10 NOTE — TELEPHONE ENCOUNTER
Completed TURBT today with intravesical instillation of gemcitabine.  Has Molina and will need TOV in 1 week.     Also discussed results of his PSMA PET scan for prostate cancer showing concern for taisha metastasis and placed referrals for oncology and radiation oncology .

## 2025-01-10 NOTE — PROGRESS NOTES
All records needed are in patients chart. No records retrieval needed at this time.     Referral received/ Chart reviewed for work up completed   Referral to: medical and radiation oncology   Dx: prostate and  bladder     Imaging completed: [x][facility] []Other:    [x] PET/CT   [x] MRI   [] CT   [] US   [] Mammo   [] Bone scan   [] N/A    Pathology completed: [x][facility] []Other:    Date:12/6/24 and 1/10/25   Location:   []N/A    Additional records needed:   [] Genomic report   [] Genetic testing results   [] Office Note   [] Procedure/ Operative note   [] Lab results   [x] N/A      [] Radiation Oncology records retrieval needed (PN to route to rad/onc clerical pool once scheduled)  Date:  Location:      Urologist:      Ezekiel        PSA Date:        Lab Results   Component Value Date    PSA 32.064 (H) 12/02/2024    PSA 22.541 (H) 07/31/2024

## 2025-01-10 NOTE — DISCHARGE INSTR - AVS FIRST PAGE
Aman, I resected three areas in your bladder.  The primary papillary tumor was concerning in appearance for bladder cancer, a medication was placed in your bladder at the end of the case to decrease the risk of bladder cancer recurrence in the bladder.  You have a Molina in place that can be removed in 1 week.  Blood in the urine is normal.  Drink plenty of fluids to flush out the blood.  Let the office know if you have lots of blood clots and the Molina catheter is not draining urine.      I sent referral to oncology and radiation oncology for your prostate cancer.  As we discussed the PET scan is concerning for spread of prostate cancer to the lymph nodes.

## 2025-01-10 NOTE — ANESTHESIA POSTPROCEDURE EVALUATION
Post-Op Assessment Note    CV Status:  Stable  Pain Score: 0    Pain management: adequate       Mental Status:  Alert and awake   Hydration Status:  Euvolemic   PONV Controlled:  Controlled   Airway Patency:  Patent     Post Op Vitals Reviewed: Yes    No anethesia notable event occurred.    Staff: CRNA           Last Filed PACU Vitals:  Vitals Value Taken Time   Temp     Pulse 80 01/10/25 1317   /92 01/10/25 1315   Resp 13 01/10/25 1317   SpO2 98 % 01/10/25 1317   Vitals shown include unfiled device data.

## 2025-01-10 NOTE — OP NOTE
OPERATIVE REPORT  PATIENT NAME: Aman Vasquez    :  1950  MRN: [id number]  Pt Location: [facility] OR ROOM 04    SURGERY DATE: 1/10/2025    Surgeons and Role:     * Memo Falcon MD - Primary    Preop Diagnosis:  Bladder mass [N32.89]    Post-Op Diagnosis Codes:     * Bladder mass [N32.89]    Procedure(s):  (TURBT)    Specimen(s):  ID Type Source Tests Collected by Time Destination   1 : right lateral wall bladder Tissue Urinary Bladder TISSUE EXAM Memo Falcon MD 1/10/2025 1226    2 : deep margin Tissue Urinary Bladder TISSUE EXAM Memo Falcon MD 1/10/2025 1231    3 : left lateral bladder wall Tissue Urinary Bladder TISSUE EXAM Memo Falcon MD 1/10/2025 1235    4 : right dome of bladder Tissue Urinary Bladder TISSUE EXAM Memo Falcon MD 1/10/2025 1246        Estimated Blood Loss:   Minimal    Drains:  Urethral Catheter Latex;Straight-tip 20 Fr. (Active)   Number of days: 0       Anesthesia Type:   General    Operative Indications:  Bladder mass [N32.89]    Operative Findings:  Large papillary 1.5 cm tumor with surrounding sessile tumor along right lateral wall.  Additional areas of sessile erythema along left lateral wall and right dome of bladder      Complications:   None    Procedure and Technique:    PROCEDURE SUMMARY:    The patient was brought to the operating room and anesthesia obtained.  The patient was then placed in the lithotomy position and prepped and draped using standard sterile technique.  All pressure points were carefully padded.  A surgical time-out occurred, antibiotics were administered, and thromboembolism prophylaxis was given.  A 27 F saline resectoscope was inserted into the urethra after dilation of the urethral meatus to 28 F using standard urethral sounds. The urethra and bladder were carefully inspected.     Cystoscopy findings:    Urethra:  Normal  Prostate:  Normal  Bladder:  Lesion identified, characteristics below  Ureteral orifices: Normal    Urachus:    Normal    Lesion characteristics:  -Appearance:    Papillary and sessile  -Number of foci:   3  -Aggregate tumor diameter:  4 cm  -Largest individual tumor:  1.5 cm papillary tumor with surrounding sessile erythema  -Location:    Right lateral wall, left lateral wall, right dome of bladder     Bladder tumor resection:    Using a 26-[place] Olympus continuous flow resectoscope, all abnormal lesions noted above were resected and sent for pathology.  Resection craters and surrounding urothelium was electrofulgurated and hemostasis was achieved.  Each section was submitted for pathology as documented above.  The bladder was then observed multiple cycles of filling and emptying hemostasis was deemed to be excellent prior to terminating the procedure.    Immediate postoperative intravesical chemotherapy:A 20 F Molina catheter was then placed sterilly and the bladder emptied.  A total of 2 g of gemcitabine diluted in 100 mL of water was then instilled into the bladder with an anticipated dwell time of 1 hour.      I was present for the entire procedure.    Patient Disposition:  PACU         SIGNATURE: Memo Falcon MD  DATE: January 10, 2025  TIME: 12:52 PM

## 2025-01-10 NOTE — ANESTHESIA PREPROCEDURE EVALUATION
Procedure:  (TURBT) (Bladder)    Relevant Problems   /RENAL   (+) BPH with urinary obstruction      HEMATOLOGY   (+) Anemia   (+) Thrombocytopenia (HCC)      MUSCULOSKELETAL   (+) Chronic midline low back pain without sciatica      NEURO/PSYCH   (+) Chronic midline low back pain without sciatica        Physical Exam    Airway    Mallampati score: II  TM Distance: >3 FB  Neck ROM: full     Dental   No notable dental hx     Cardiovascular      Pulmonary      Other Findings        Anesthesia Plan  ASA Score- 2     Anesthesia Type- general with ASA Monitors.         Additional Monitors:     Airway Plan:            Plan Factors-    Chart reviewed.    Patient summary reviewed.                  Induction- intravenous.    Postoperative Plan- Plan for postoperative opioid use. Planned trial extubation    Perioperative Resuscitation Plan - Level 1 - Full Code.       Informed Consent- Anesthetic plan and risks discussed with patient.  I personally reviewed this patient with the CRNA. Discussed and agreed on the Anesthesia Plan with the CRNA..

## 2025-01-10 NOTE — INTERVAL H&P NOTE
H&P reviewed. After examining the patient I find no changes in the patients condition since the H&P had been written.    Vitals:    01/10/25 1014   BP: 123/78   Pulse: 56   Resp: 20   Temp: 97.8 °F (36.6 °C)   SpO2: 95%

## 2025-01-11 ENCOUNTER — NURSE TRIAGE (OUTPATIENT)
Dept: OTHER | Facility: OTHER | Age: 75
End: 2025-01-11

## 2025-01-11 NOTE — TELEPHONE ENCOUNTER
"Reason for Disposition  • Other post-op symptom or question    Answer Assessment - Initial Assessment Questions  1. SYMPTOM: \"What's the main symptom you're concerned about?\" (e.g., pain, fever, vomiting)      Blood in the urine       Bright red in color       Denies any blood clots     2. ONSET: \"When did s/s  start?\"      Started around 1200    3. SURGERY: \"What surgery did you have?\"      (TURBT) (Bladder)    4. DATE of SURGERY: \"When was the surgery?\"       1/10/25    5. ANESTHESIA: \"What type of anesthesia did you have?\" (e.g., general, spinal, epidural, local)      General     6. DRAINS: \"Were any drains place in or around the wound?\" (e.g., Hemovac, Eric-[place], [place])             Denies     7. PAIN: \"Is there any pain?\" If Yes, ask: \"How bad is it?\"  (Scale 1-10; or mild, moderate, severe)             Current pain 2/10    8. FEVER: \"Do you have a fever?\" If Yes, ask: \"What is your temperature, how was it measured, and when did it start?\"             Denies     9. VOMITING: \"Is there any vomiting?\" If Yes, ask: \"How many times?\"              Denies     10. BLEEDING: \"Is there any bleeding?\" If Yes, ask: \"How much?\" and \"Where?\"               Denies     11. OTHER SYMPTOMS: \"Do you have any other symptoms?\" (e.g., drainage from wound, painful urination, constipation)                Denies any other symptoms at this time.    Protocols used: Post-Op Symptoms and Questions-Adult-AH    "

## 2025-01-11 NOTE — TELEPHONE ENCOUNTER
Patient calling in with concerns due to blood in his urine in his catheter. Patient stated the blood is a bright red color but there are no clots present. Informed the patient that some blood in the urine can be normal after the surgery he had done. Recommended he increase his fluid intake and continue to monitor his urine output. Patient denies any other symptoms at this time, stated his pain is currently a 2/10 and he has been emptying about 1/4 of urine every couple of hours from his catheter. Instructed patient to call back if the blood in is urine continues to worsen/he has any blood clots, he notices a significant decrease in his urine output, he has worsening pain or other concerns. Patient verbalized understanding. No further questions at this time.

## 2025-01-13 NOTE — TELEPHONE ENCOUNTER
Post Op Note    Aman Vasquez is a 75 y.o. male s/p TURBT (Bladder)  performed 1/10/25.  Aman Vasquez is a patient of Dr. Dr. Falcon and is seen at the [place] office.     How would you rate your pain on a scale from 1 to 10, 10 being the worst pain ever? Pt stated some pain at times but only depends on certain ways that he moves. Advised that it is most likely from the catheter and the placement is causing irritation at times. Advised as long as it is not dark red with clots to just monitor symptoms. Pt understood.   Have you had a fever? No  Have your bowel movements been regular? Yes  If the patient has a champagne- are you comfortable caring for your champagne? Yes Is it draining urine? Yes; some blood as times but urine is orange. Advised for pt to drink 60-80 oz of fluids to flush kidneys out. Also advised that the urine is orange due to the medication Pyridium. Pt understood.   Do you have any other questions or concerns that I can address at this time? Not at this time    Pt stated he is doing good besides the bright red blood he experienced on Saturday. Stated that his urine has cleared up since and that it is just orange. Pt was advised if any other questions or concerns to call our office and that there is always a nurse on call 24/7.    Pt is scheduled for champagne removal/TOV Friday (1/17/25) @ 8:30 am & 3 pm. Pt is also scheduled for a 2 wk FU appointment 1/29/25.

## 2025-01-16 PROCEDURE — 88307 TISSUE EXAM BY PATHOLOGIST: CPT | Performed by: PATHOLOGY

## 2025-01-16 PROCEDURE — 88341 IMHCHEM/IMCYTCHM EA ADD ANTB: CPT | Performed by: PATHOLOGY

## 2025-01-16 PROCEDURE — 88342 IMHCHEM/IMCYTCHM 1ST ANTB: CPT | Performed by: PATHOLOGY

## 2025-01-16 NOTE — PROGRESS NOTES
Rad onc scheduling pending authorization. Patient is very hesitant to have radiation. Offered encouragement to have all consults and make a decision once all information has been reviewed by specialists    Oncology Nurse Navigator made call to patient due to referral to provider within [facility]. I spoke with patient about my role as an Oncology Nurse Navigator. I explained how to reach the office for any routine or urgent matters and the availability of patient navigation for non urgent matters. Explained oncology navigation is here to help patients through their journey and to offer assistance with any barriers to care. As a team, we strive to be patient advocates and help navigate healthcare system. Patient aware that medical oncology nursing will be primary contact once consult is complete and patient navigator will continue to offer support through entire journey. Conversation is based on evidence based care to optimize patient outcomes. Emotional support provided throughout conversation.       Evaluated for any barriers to care.   Distress thermometer completed by PN   Genetic testing not indicated   Smoking status verified non smoker   Prior cancer and radiation history negative   Provided contact information for nurse navigator and patient navigator  Verified PCP/insurance on file  Discussed use of My Chart patient uses regularly     Answered questions to pt's satisfaction.  Reviewed upcoming appts. Provided support and provided direct contact information for any questions or concerns.       Future Appointments   Date Time Provider Department Center   1/17/2025  8:30 AM UROLOGY NURSE [facility]   1/17/2025  3:00 PM UROLOGY NURSE [facility]   1/27/2025  2:15 PM Cristofer Llamas MD [facility]   1/29/2025  9:45 AM Memo Falcon MD [facility]   3/6/2025 10:20 AM MD Anastasia Ríos

## 2025-01-17 ENCOUNTER — PROCEDURE VISIT (OUTPATIENT)
Dept: UROLOGY | Facility: MEDICAL CENTER | Age: 75
End: 2025-01-17
Payer: COMMERCIAL

## 2025-01-17 VITALS
WEIGHT: 255 LBS | BODY MASS INDEX: 30.11 KG/M2 | DIASTOLIC BLOOD PRESSURE: 78 MMHG | HEART RATE: 76 BPM | SYSTOLIC BLOOD PRESSURE: 140 MMHG | HEIGHT: 77 IN | OXYGEN SATURATION: 94 %

## 2025-01-17 DIAGNOSIS — C61 PROSTATE CANCER (HCC): Primary | ICD-10-CM

## 2025-01-17 PROBLEM — C67.9 UROTHELIAL CARCINOMA OF BLADDER WITHOUT INVASION OF MUSCLE (HCC): Status: ACTIVE | Noted: 2025-01-17

## 2025-01-17 LAB — POST-VOID RESIDUAL VOLUME, ML POC: 8 ML

## 2025-01-17 PROCEDURE — 99024 POSTOP FOLLOW-UP VISIT: CPT

## 2025-01-17 PROCEDURE — 51798 US URINE CAPACITY MEASURE: CPT

## 2025-01-17 NOTE — PROGRESS NOTES
"1/17/2025    Aman Vasquez  1950  [id number]    Diagnosis  Chief Complaint    Post-op         Patient presents for champagne removal/TOV s/p TURBT 1/10 managed by Dr. Falcon    Plan  Return to the office for scheduled FU appointment 1/29    Procedure Champagne removal/voiding trial    Champagne catheter removed after deflation of an intact balloon. Patient tolerated well. Encouraged patient to hydrate well and return this afternoon for post void residual. He knows he may return early if uncomfortable and unable to urinate. Patient agrees to this plan.    Patient returned this afternoon. Patient states able to void. Patient voided while in office. Bladder ultrasound performed and PVR measured 8 ml.    Advised pt of ED precautions and post champagne removal symptoms. Advised if any other questions or concerns to give our office a call.     Recent Results (from the past 4 hours)   POCT Measure PVR    Collection Time: 01/17/25  2:37 PM   Result Value Ref Range    POST-VOID RESIDUAL VOLUME, ML POC 8 mL           Vitals:    01/17/25 0820   BP: 140/78   BP Location: Left arm   Patient Position: Sitting   Cuff Size: Adult   Pulse: 76   SpO2: 94%   Weight: 116 kg (255 lb)   Height: 6' 5\" (1.956 m)           Diane Graff RN       "

## 2025-01-18 NOTE — ASSESSMENT & PLAN NOTE
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 S/p intravesical instillation of gemcitabine.   - to continue follow up with urology        "

## 2025-01-18 NOTE — PROGRESS NOTES
"Name: Aman Vasquez      : 1950      MRN: [id number]  Encounter Provider: Cristofer Llamas MD  Encounter Date: 2025   Encounter department: 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   We discussed options of management , pros and cons. He refused chemotherapy and wants to avoid prednisone.     See plan below.     Orders:    Ambulatory referral to Palliative Care; Future    PSA Total, Diagnostic; Standing    CBC and differential; Future    Comprehensive metabolic panel; Future    Lipid panel; Future    Vitamin D 25 hydroxy; Future    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)  TURBT (1/10/25) showed \"High-grade papillary urothelial carcinoma with inverted growth pattern; cannot exclude focal superficial lamina propria invasion\"  . S/p intravesical instillation of gemcitabine.   - to continue follow up with urology        Prostate cancer (HCC)    Orders:    Ambulatory Referral to Hematology / Oncology    Thrombocytopenia (HCC)           Sister Jovita is the emergency contact. He lives with his wife, who has dementia.     Original ECO    Today's ECO    Goals and Barriers:  Current Goal: Minimize effects of disease burden, extend life.   Barriers to accomplishing this: None    Patient's Capacity to Self Care:  Patient is able to self care        Aman Vasquez is a 75 y.o. male with PMHx of BPH, thrombocytopenia, anemia, chronic back pain, and asbestos exposure seen in initial consultation by medical oncology on 25 as a referral for metastatic castrate naive prostate adenocarcinoma and bladder cancer.    The patient was noted to have an elevated PSA of 22.5 (24).  He was referred to urology who noted a firm nodule on the L side of the prostate on HERON, and repeat " "PSA was 32.1 (12/2/24).  MRI Prostate (11/26/24) showed PI-RADS 5 which showed a 5.2 cm lesion of the L peripheral zone with probable extra prostatic extension, enlarged R external iliac lymph node, no osseous metastatic disease, 1.2 cm R-sided bladder lesion concerning for urothelial carcinoma, and a prostate volume of 51 mL.  A prostate biopsy was performed (12/6/24) which showed adenocarcinoma of the prostate, [place] 4+4=8, Grade Group 4.  PET CT (12/31/24) confirmed MRI findings and showed avid taisha metastases in the lower abdomen and Right pelvis.    TURBT (1/10/25) showed \"High-grade papillary urothelial carcinoma with inverted growth pattern; cannot exclude focal superficial lamina propria invasion\"  . S/p intravesical instillation of gemcitabine.     Colonoscopy (12/18/24) removed a benign rectal polyp.  Punch biopsy (12/23/24) of mid back was benign.    Discussion of decision making  Oncology history updated, accordingly, during this visit  Goals of care/patient communication  I discussed with the patient the clinical course leading up to their cancer diagnosis. I reviewed relevant office notes, imaging reports and pathology result as well.  I told the patient that this is a case of likely incurable disease and what this means. We discussed that the goal of anti-cancer therapy is to provide best quality of life, extend overall survival, and progression free survival as shown in clinical trials. We also discussed that there might be a point when the cancer will no longer respond to this anti-neoplastic therapy. As a result, we also discussed the role of the palliative care team being introduced early in the treatment course. We will be making this referral.  I explained the risks/benefits of the proposed cancer therapy: Nubeqa + Docetaxel vs Erleada + ADT and after discussion including understanding risks of possible life-threatening complications and therapy-related malignancy development, informed " "consent has been signed and obtained for the Erleada + Orgovyx.        According to  [place] ClinicalTrials.gov number, [id number]   \"A total of 525 patients were assigned to receive apalutamide plus ADT and 527 to receive placebo plus ADT. The median age was 68 years. A total of 16.4% of the patients had undergone prostatectomy or received radiotherapy for localized disease, and 10.7% had received previous docetaxel therapy; 62.7% had high-volume disease, and 37.3% had low-volume disease. At the first interim analysis, with a median of 22.7 months of follow-up, the percentage of patients with radiographic progression-free survival at 24 months was 68.2% in the apalutamide group and 47.5% in the placebo group (hazard ratio for radiographic progression or death, 0.48; 95% confidence interval [CI], 0.39 to 0.60; P<0.001). Overall survival at 24 months was also greater with apalutamide than with placebo (82.4% in the apalutamide group vs. 73.5% in the placebo group; hazard ratio for death, 0.67; 95% CI, 0.51 to 0.89; P=0.005). The frequency of grade 3 or 4 adverse events was 42.2% in the apalutamide group and 40.8% in the placebo group; rash was more common in the apalutamide group.   Conclusions  In this trial involving patients with metastatic, castration-sensitive prostate cancer, overall survival and radiographic progression-free survival were significantly longer with the addition of apalutamide to ADT than with placebo plus ADT, and the side-effect profile did not differ substantially between the two groups.\"  Apalutamide for Metastatic, Castration-Sensitive Prostate Cancer  [place] Journal of Medicine           TNM/Staging At Diagnosis  Cancer Staging   Primary prostate adenocarcinoma (HCC)  Staging form: Prostate, AJCC 8th Edition  - Clinical: Stage IVB (cT2, cN1, cM1, PSA: 32, Grade Group: 4) - Signed by Cristofer Llamas MD on 1/17/2025  Prostate specific antigen (PSA) range: 20 or greater  [place] " score: 8  Histologic grading system: 5 grade system    Disease Features/Tumor Markers/Genetics  Tumor Marker:  PSA 22.5 (7/31/24)  PSA 32.1 (12/2/24)  Notable Path Features: adenocarcinoma of the prostate, [place] 4+4=8, Grade Group 4  Treatment: Erleada + Orgovyx   Other Supportive care:   Treatment Team Members  Surgeon: Dr. Falcon  Rad Onc  Palliative  Labs  5/10/24 Lipid panel wnl   12/2/24 BMP unremarkable ; CBC  (stable)   Diagnostics    11/26/2024 MRI Prostate: PI-RADS 5 which showed a 5.2 cm lesion of the L peripheral zone with probable extra prostatic extension, enlarged R external iliac lymph node, no osseous metastatic disease, 1.2 cm R-sided bladder lesion concerning for urothelial carcinoma, and a prostate volume of 51 mL.  12/6/2024: Prostate biopsy: adenocarcinoma of the prostate, [place] 4+4=8, Grade Group 4  12/31/2024 PET CT: Prostatomegaly, multifocal areas of increased uptake in the R posteriolateral peripheral zone, avid taisha metastasis in the lower abdomen and R pelvis, no seminal vesicle or osseous involvement    Discussion of decision making    I personally reviewed the following lab results, the image studies, pathology, other specialty/physicians consult notes and recommendations, and outside medical records. I had a lengthy discussion with the patient and shared the work-up findings. We discussed the diagnosis and management plan as below. I spent 65 minutes reviewing the records (labs, clinician notes, outside records, medical history, ordering medicine/tests/procedures, monitoring of anti-neoplastic toxicities, interpreting the imaging/labs previously done) and coordination of care as well as direct time with the patient today, of which greater than 50% of the time was spent in counseling and coordination of care with the patient/family.    Plan/Labs   will start ADT: Orgovyx 360 mg loading dose on day 1 followed by 240 mg daily dose afterward , plus Erleada (Apalutamide) 240 mg  daily   to continue follow up with Rad Oncology - ongoing discussion about radiation therapy.   will send prostate specimen for NGS Booker 360 kit with the instruction is to be provided today   Q4 weeks PSA and to repeate CBC , CMP and Lipid Profile before next visit in 2-3 month   referral to palliative care   down the road will need DEXA scan to evaluate bone density on ADT         Follow Up: 6 weeks    All questions were answered to the patient's satisfaction during this encounter. The patient knows the contact information for our office and knows to reach out for any relevant concerns related to this encounter. They are to call for any temperature 100.4 or higher, new symptoms including but not restricted to shaking chills, decreased appetite, nausea, vomiting, diarrhea, increased fatigue, shortness of breath or chest pain, confusion, and not feeling the strength to come to the clinic. For all other listed problems and medical diagnosis in their chart - they are managed by PCP and/or other specialists, which the patient acknowledges. Thank you very much for your consultation and making us a part of this patient's care. We are continuing to follow closely with you. Please do not hesitate to reach out to me with any additional questions or concerns.    Additional recommendations to follow per attending, Dr. Llamas.    Mohsen Leigh DO, PGY4  Hematology/Oncology Fellow    Cristofer Llamas MD  Hematology & Medical Oncology Staff Physician             Disclaimer: This document was prepared using [employer] Flu[employer] Direct technology. If a word or phrase is confusing, or does not make sense, this is likely due to recognition error which was not discovered during this clinician's review. If you believe an error has occurred, please contact me through [facility] service line for iveth?cation.          DATA REVIEW:    Pathology Result:    Final Diagnosis   Date Value Ref Range Status   01/10/2025   Final    A.  Bladder, Right Lateral Wall, Biopsy:  - High-grade papillary urothelial carcinoma with inverted growth pattern; cannot exclude focal superficial lamina propria invasion.  - Benign muscularis propria present.    B. Bladder, Deep Margin, Biopsy:  - Portions of muscularis propria with chronic inflammation and reactive changes.    C. 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  - Invasion not seen on H&E.  - Benign muscularis propria present.    D. Bladder, Right Dome, Biopsy:  - Rare detached atypical urothelial cells present. See Note.  - Abundant denuded urothelial mucosa with marked reactive changes     12/23/2024   Final    A. Skin, upper mid back, punch biopsy:    Ulceration, superficial small-vessel ectasia, and superficial dermal lymphocytic inflammatory infiltrate with scattered neutrophils (see note).    Note: The histopathologic findings are non-specific, though findings suggestive of trauma/irritation are present. Findings diagnostic of a neutrophilic dermatosis (e.g., pyoderma gangrenosum) are not appreciated (though the diagnosis cannot be fully excluded based upon these histopathologic findings alone). Clinical pathologic correlation is essential. Pathogenic microorganisms are not seen with PAS and Gram stains. Multiple levels examined. If the rash were to progress/persist despite therapy, additional sampling should be sought to exclude development of a more significant disease.         12/18/2024   Final    A. Polyp, Colorectal, Rectal polyp-cold bx:  - Minute piece of polypoid colonic mucosa, no definitive hyperplastic or adenomatous changes seen       12/06/2024   Final    A. Prostate, L Lat Base, needle Biopsy:   - Adenocarcinoma , immunohistochemically compatible with Prostatic adenocarcinoma  -Christine grade  4 +3  = score  7  -Cribriform pattern: present  -Intraductal carcinoma: present  -Tumor involves one submitted core  -Tumor involves approximately 95% of  core  biopsy  - Perineural invasion: Not identified      B. Prostate, L Base, needle Biopsy:   - Adenocarcinoma , immunohistochemically compatible with Prostatic adenocarcinoma  -[place] grade 4+ 4 = score  8  -Cribriform pattern: present  -Intraductal carcinoma: present  -Tumor involves one submitted core  -Tumor involves approximately 80% of  core biopsy  - Perineural invasion: Not identified  Note  A small component of grade 5 can not be ruled out     C. Prostate, L Lat Mid, needle Biopsy:   Adenocarcinoma , compatible with Prostatic adenocarcinoma  -[place] grade 4 + 3=  score  7  -Cribriform pattern: present  -Tumor involves one submitted core  -Tumor involves approximately 95% of  core biopsy  - Perineural invasion: Not identified      D. Prostate, L Mid, needle Biopsy:   - Adenocarcinoma , immunohistochemically compatible with Prostatic adenocarcinoma  -[place] grade 4 + 3=  score  7   Cribriform pattern: present  -Tumor involves one submitted core  -Tumor  discontinuously involves approximately 95% of  core biopsy  - Perineural invasion: present      E. Prostate, L Lat Rulo, needle Biopsy:   Adenocarcinoma , compatible with Prostatic adenocarcinoma  -[place] grade 4 + 3=  score  7  Cribriform pattern: present  -Tumor involves one submitted core  -Tumor  discontinuously involves approximately 95% of  core biopsy  - Perineural invasion: present      F. Prostate, L Rulo, eedle Biopsy:   - Adenocarcinoma , immunohistochemically compatible with Prostatic adenocarcinoma  -[place] grade 4 + 3=  score  7   Cribriform pattern: present  -Tumor involves one submitted core  -Tumor  discontinuously involves approximately 90% of  core biopsy  - Perineural invasion: present  Note  A small component of grade 5 can not be ruled out       G. Prostate, R Lat Base, needle Biopsy:   Adenocarcinoma , compatible with Prostatic adenocarcinoma  -[place] grade 4 + 4 =  score  8  Cribriform pattern: present  -Tumor involves one submitted  core  -Tumor  discontinuously involves approximately 90% of  core biopsy  - Perineural invasion: present      H. Prostate, R Base,  needle Biopsy:   - Adenocarcinoma , immunohistochemically compatible with Prostatic adenocarcinoma  -[place] grade 4 + 4 =  score  8  Cribriform pattern: present  -Tumor involves one submitted core  -Tumor  discontinuously involves approximately 90% of  core biopsy  - Perineural invasion: present      I. Prostate, R Lat Mid, needle Biopsy:   - Adenocarcinoma , immunohistochemically compatible with Prostatic adenocarcinoma  -[place] grade 4 + 4 =  score  8  Cribriform pattern: not identifed  -Tumor involves one submitted core  -Tumor  discontinuously involves approximately 90% of  core biopsy  - Perineural invasion: not identifed      J. Prostate, R Mid, needle Biopsy:   Adenocarcinoma , compatible with Prostatic adenocarcinoma  -[place] grade 4 + 3=  score  7  -Cribriform Pattern: Not identified  -Tumor involves one submitted core  -Tumor involves approximately 30% of  core biopsy  - Perineural invasion: Not identified      K. Prostate, R Lat [place], needle Biopsy:   Adenocarcinoma , compatible with Prostatic adenocarcinoma  -[place] grade 4 + 3=  score  7  -Cribriform Pattern: Not identified  -Tumor involves one submitted core  -Tumor discontinuously  involves approximately 90% of  core biopsy  - Perineural invasion: Not identified      L. Prostate, R [place], needle Biopsy:   Adenocarcinoma , compatible with Prostatic adenocarcinoma  -[place] grade 3 + 3=  score  6  Tumor involves one submitted core  -Tumor discontinuously  involves approximately 60% of  core biopsy  - Perineural invasion: Not identified    Note  Tumor cells are  positive for Prostatic marker (NKX3.1 ( diffuse) , PSAP ) and CDX2 ( patchy nuclear staining), P16 ( patchy cytoplasmic  staining) and negative for CK20  (colon marker) . Stains performed on multiple blocks Controls reacted appropriately     Based on this staining  pattern and combined with high serum PSA, the tumor is consistent with Prostatic adenocarcinoma , however  because of positive staining for ( CDX2, P16)  extension from other organs including colorectal rectal carcinoma can not be  ruled out. Additional investigations including GI endoscopy  are recommend for definite diagnosis     12/03/2024   Final    A. Urine, Clean Catch, :  Suspicious for high grade urothelial carcinoma (SHGUC) - see comment.  Atypical but degenerative cells with nuclear enlargement, irregularities and hyperchromasia.    Comment:  The above diagnostic category is from the recently published book, The [place] System for Reporting Urinary Cytology, and is in keeping with the ongoing effort for utilization of standardized diagnostic terminology in urine cytology.*    *The [place]ba System for Reporting Urinary Cytology. Shelby Rosales, Viktoriya Heaton, Bishnu Lea; 2016.            Bladder: Surveillance as per Urology, currently appears to be [id number]      History of Present Illness   Chief Complaint   Patient presents with    Consult     12/2/2024 PSA: 32    12/3/2024: Urine, Clean Catch, :  Suspicious for high grade urothelial carcinoma    12/6/2024 Prostate bx  G4+4 (8)    12/31/2024 PET/CT PSMA Prostatomegaly with multifocal areas of increased PSMA activity particularly in the right posterolateral peripheral zone in keeping with recent biopsy-proven prostate cancer. Radiotracer avid taisha metastases in the lower abdomen and right pelvis    1/10/2025: Bladder, Right Lateral Wall, Biopsy:  - High-grade papillary urothelial carcinoma with inverted growth pattern; cannot exclude focal superficial lamina propria invasion.  - Benign muscularis propria present    Oncology History   Cancer Staging   Primary prostate adenocarcinoma (HCC)  Staging form: Prostate, AJCC 8th Edition  - Clinical: Stage IVB (cT2, cN1, cM1, PSA: 32, Grade Group: 4) - Signed by Cristofer Llamas MD on  "1/17/2025  Prostate specific antigen (PSA) range: 20 or greater  [place] score: 8  Histologic grading system: 5 grade system    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)  Staging form: Urinary Bladder, AJCC 8th Edition  - Pathologic: pT1, cN0, cM0 - Signed by Lisette Jones MD on 1/21/2025  Stage used in treatment planning: Yes  National guidelines used in treatment planning: Yes  Type of national guideline used in treatment planning: NCCN  Oncology History   Primary prostate adenocarcinoma (HCC)   1/17/2025 Initial Diagnosis    Primary prostate adenocarcinoma (HCC)     1/17/2025 -  Cancer Staged    Staging form: Prostate, AJCC 8th Edition  - Clinical: Stage IVB (cT2, cN1, cM1, PSA: 32, Grade Group: 4) - Signed by Cristofer Llamas MD on 1/17/2025  Prostate specific antigen (PSA) range: 20 or greater  [place] score: 8  Histologic grading system: 5 grade system       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)   1/17/2025 Initial Diagnosis    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)     1/21/2025 -  Cancer Staged    Staging form: Urinary Bladder, AJCC 8th Edition  - Pathologic: pT1, cN0, cM0 - Signed by Lisette Jones MD on 1/21/2025  Stage used in treatment planning: Yes  National guidelines used in treatment planning: Yes  Type of national guideline used in treatment planning: NCCN          Pertinent Medical History   01/27/25:     Review of Systems  Denies F/C, N/V, SOB, CP, LH, HA, rash, itching, gen weakness, melena, hematuria, hematochezia, LOC, falls, diarrhea, or constipation .   Denies any B symptoms and denies any urinary symptoms.   Worked in electricity and served in the  , in [place] with known exposure to asbestos as well as agent orange.     Objective   /76 (Patient Position: Sitting, Cuff Size: Large)   Pulse 77   Temp (!) 97.2 °F (36.2 °C) (Temporal)   Resp 18   Ht 6' 5\" (1.956 m)   Wt 117 kg (257 lb) Comment: pt " reports 250 lbs at home  SpO2 96%   BMI 30.48 kg/m²     Pain Screening:  Pain Score: 0-No pain    Physical Exam  General: Appearance: alert, cooperative, no distress.  HEENT: Normocephalic, atraumatic. No scleral icterus. conjunctivae clear. EOMI.  Chest: No tenderness to palpation. No open wound noted.  Lungs: Clear to auscultation bilaterally, Respirations unlabored.  Cardiac: Regular rate and rhythm, +S1and S2, -r/m/g  Abdomen: Soft, non-tender, non-distended. Bowel sounds are normal. No hepatosplenomegaly noted.  Extremities:  No edema, cyanosis, clubbing.  Skin: Skin color, turgor are normal. No rashes.  Lymphatics: no palpable supra-cervical, axillary, or inguinal adenopathy  Neurologic: Awake, Alert, and oriented, no gross focal deficits noted b/l.     Labs: I have reviewed the following labs:  Lab Results   Component Value Date/Time    WBC 6.00 12/27/2024 10:28 AM    RBC 4.53 12/27/2024 10:28 AM    Hemoglobin 14.4 12/27/2024 10:28 AM    Hematocrit 44.0 12/27/2024 10:28 AM    MCV 97 12/27/2024 10:28 AM    MCH 31.8 12/27/2024 10:28 AM    RDW 13.8 12/27/2024 10:28 AM    Platelets 145 (L) 12/27/2024 10:28 AM    Segmented % 61 12/27/2024 10:28 AM    Lymphocytes % 28 12/27/2024 10:28 AM    Monocytes % 9 12/27/2024 10:28 AM    Eosinophils Relative 2 12/27/2024 10:28 AM    Basophils Relative 0 12/27/2024 10:28 AM    Immature Grans % 0 12/27/2024 10:28 AM    Absolute Neutrophils 3.68 12/27/2024 10:28 AM     Lab Results   Component Value Date/Time    Potassium 4.2 12/02/2024 08:54 AM    Chloride 111 (H) 12/02/2024 08:54 AM    CO2 22 12/02/2024 08:54 AM    BUN 18 12/02/2024 08:54 AM    Creatinine 0.93 12/02/2024 08:54 AM    Glucose, Fasting 92 12/02/2024 08:54 AM    Calcium 9.1 12/02/2024 08:54 AM    AST 18 05/10/2024 11:24 AM    ALT 17 05/10/2024 11:24 AM    Alkaline Phosphatase 81 05/10/2024 11:24 AM    Total Protein 7.1 05/10/2024 11:24 AM    Albumin 4.1 05/10/2024 11:24 AM    Total Bilirubin 0.70 05/10/2024 11:24  ANN    eGFR 79 12/02/2024 08:54 AM     Zhen Conde DO   Hematology and Medical Oncology - PGY V  [facility]

## 2025-01-18 NOTE — ASSESSMENT & PLAN NOTE
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   We discussed options of management , pros and cons. He refused chemotherapy and wants to avoid prednisone.     See plan below.     Orders:    Ambulatory referral to Palliative Care; Future    PSA Total, Diagnostic; Standing    CBC and differential; Future    Comprehensive metabolic panel; Future    Lipid panel; Future    Vitamin D 25 hydroxy; Future

## 2025-01-20 ENCOUNTER — DOCUMENTATION (OUTPATIENT)
Dept: GENETICS | Facility: CLINIC | Age: 75
End: 2025-01-20

## 2025-01-21 ENCOUNTER — OFFICE VISIT (OUTPATIENT)
Dept: GENETICS | Facility: CLINIC | Age: 75
End: 2025-01-21
Payer: COMMERCIAL

## 2025-01-21 DIAGNOSIS — C67.9 UROTHELIAL CARCINOMA OF BLADDER WITHOUT INVASION OF MUSCLE (HCC): Primary | ICD-10-CM

## 2025-01-21 DIAGNOSIS — Z80.3 FAMILY HISTORY OF BREAST CANCER: ICD-10-CM

## 2025-01-21 DIAGNOSIS — C61 PROSTATE CANCER (HCC): ICD-10-CM

## 2025-01-21 PROCEDURE — 96041 GENETIC COUNSELING SVC EA 30: CPT | Performed by: GENETIC COUNSELOR, MS

## 2025-01-21 NOTE — PROGRESS NOTES
"Pre-Test Genetic Counseling Consult Note    Patient Name: Aman Vasquez   /Age: 1950/75 y.o.  Referring Provider: Dr. Llamas    Date of Service: 2025  Genetic Counselor: Janneth Smalls MS, [facility] [place]pretation Services: None  Location: Telephone consult   Length of Visit: 60 Minutes    Aman was referred to the [facility] Cancer Risk and Genetic Assessment Program due to his personal history of prostate cancer and high-grade papillary urothelial cancer.  He also has a family history of breast cancer. he presents today to discuss the possibility of a hereditary cancer syndrome, options for genetic testing, and implications for him and his family.        Cancer History and Treatment:     Personal History:   Oncology History   Primary prostate adenocarcinoma (HCC)   2025 Initial Diagnosis    Primary prostate adenocarcinoma (HCC)     2025 -  Cancer Staged    Staging form: Prostate, AJCC 8th Edition  - Clinical: Stage IVB (cT2, cN1, cM1, PSA: 32, Grade Group: 4) - Signed by Cristofer Llamas MD on 2025  Prostate specific antigen (PSA) range: 20 or greater  Drummond score: 8  Histologic grading system: 5 grade system         Screening Hx: Not assessed     Medical and Surgical History  Pertinent surgical history:   Past Surgical History:   Procedure Laterality Date    COLONOSCOPY      HEMORROIDECTOMY      VA CYSTO W/REMOVAL OF LESIONS SMALL N/A 1/10/2025    Procedure: (TURBT);  Surgeon: Memo Falcon MD;  Location: [facility];  Service: Urology      Pertinent medical history:  Past Medical History:   Diagnosis Date    Colon polyp     Fatty liver     Hemorrhoids     History of transfusion 2014    Kidney stone     on PET scan    Prostate cancer (HCC)        Other History:  Height:   Ht Readings from Last 1 Encounters:   25 6' 5\" (1.956 m)     Weight:   Wt Readings from Last 1 Encounters:   25 116 kg (255 lb)     Relevant Family History   Patient reports no Ashkenazi Yazidism ancestry. "             Please refer to the scanned pedigree in the Media Tab for a complete family history     *All history is reported as provided by the patient; records are not available for review, except where indicated.     Assessment:  We discussed sporadic, familial and hereditary cancer.  We also discussed the many factors that influence our risk for cancer such as age, environmental exposures, lifestyle choices and family history.      We reviewed the indications suggestive of a hereditary predisposition to cancer.    Genetic testing is indicated for Aman based on the following criteria:     Meets NCCN V2.2025 Testing Criteria for High-Penetrance Breast Cancer Susceptibility Genes: Family history of 2 close blood relatives including a sister and daughter with breast cancer.  One of whom was diagnosed under the age of 50.      Meets NCCN V2.2025 Testing Criteria for Prostate Cancer Susceptibility Genes: Personal history of Very high-risk or high-risk prostate cancer     The risks, benefits, and limitations of genetic testing were reviewed with the patient, as well as genetic discrimination laws, and possible test results (positive, negative, variants of uncertain significance) and their clinical implications. If positive for a mutation, options for managing cancer risk including increased surveillance, chemoprevention, and in some cases prophylactic surgery were discussed. Aman was informed that if a hereditary cancer syndrome was identified in him, first degree relatives (parents, siblings, and children) have a chance of also inheriting the condition. Genetic testing would allow for predictive genetic testing in other relatives, who may also be at risk depending on their degree of relation.     Billing:  Most individuals pay <$100 for hereditary cancer genetic testing. If insurance covers the cost of the testing, individuals may still pay out of pocket secondary to co-pays, co-insurance, or deductibles. If the cost  of the testing exceeds $100, the lab will reach out to the patient via phone or e-mail. The patient will then have the option to proceed with the testing, cancel the testing, or elect the self-pay option of $250. Aman verbalized understanding.     Plan: Patient decided not to proceed with testing at this time.   Aman wanted to discuss genetic testing with his health care provider and family prior to proceeding.  He can reach out to our office at [phone] option 3 if he decides to proceed.

## 2025-01-21 NOTE — ASSESSMENT & PLAN NOTE
Patient is a 75 y.o. male with locally advanced/metastatic prostate cancer with non-regional lymph node metastases. Personally reviewed his PSMA PET scan. Case was discussed with Dr. Llamas from medical oncology. Recommend starting with systemic therapy, to be discussed with medical oncology. Patient stated today that he does not want chemotherapy. Encouraged him to discuss with medical oncology. He should at least receive androgen deprivation therapy. If he receives chemotherapy, radiation therapy would start afterward. If not, then the goal is to start radiotherapy about 1 month after starting ADT. Also discussed placement of fiducial markers and rectal spacer. Radiotherapy would likely entail a total of 44 treatments. Potential side effects include fatigue, dysuria, diarrhea, urinary/bowel frequency and urgency, small risk of damage to bowel and bladder, risk of sexual dysfunction, small risk of secondary malignancy. Will await patient's discussion and decision with medical oncology before proceeding with fidicuals/rectal spacer/consent for radiotherapy and planning.

## 2025-01-21 NOTE — ASSESSMENT & PLAN NOTE
Patient has had TURBT which so far shows non-muscle-invasive bladder cancer. He has follow up with urology. At this time, no bladder radiotherapy is planned.

## 2025-01-21 NOTE — PROGRESS NOTES
Radiation Oncology Consult    Name: Aman Vasquez      : 1950      MRN: [id number]  Encounter Provider: Lisette Jones MD  Encounter Date: 2025   Encounter department: [facility] RADIATION ONCOLOGY     Cancer Staging   Primary prostate adenocarcinoma (HCC)  Staging form: Prostate, AJCC 8th Edition  - Clinical: Stage IVB (cT2, cN1, cM1, PSA: 32, Grade Group: 4) - Signed by Cristofer Llamas MD on 2025    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)  Staging form: Urinary Bladder, AJCC 8th Edition  - Pathologic: pT1, cN0, cM0 - Signed by Lisette Jones MD on 2025    :  Assessment & Plan  Primary prostate adenocarcinoma (HCC)    Patient is a 75 y.o. male with locally advanced/metastatic prostate cancer with non-regional lymph node metastases. Personally reviewed his PSMA PET scan. Case was discussed with Dr. Llamas from medical oncology. Recommend starting with systemic therapy, to be discussed with medical oncology. Patient stated today that he does not want chemotherapy. Encouraged him to discuss with medical oncology. He should at least receive androgen deprivation therapy. If he receives chemotherapy, radiation therapy would start afterward. If not, then the goal is to start radiotherapy about 1 month after starting ADT. Also discussed placement of fiducial markers and rectal spacer. Radiotherapy would likely entail a total of 44 treatments. Potential side effects include fatigue, dysuria, diarrhea, urinary/bowel frequency and urgency, small risk of damage to bowel and bladder, risk of sexual dysfunction, small risk of secondary malignancy. Will await patient's discussion and decision with medical oncology before proceeding with fidicuals/rectal spacer/consent for radiotherapy and planning.       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 He has follow  up with urology. At this time, no bladder radiotherapy is planned.       Time: 65 minutes spent reviewing patient's health record and speaking with/examining patient.        History of Present Illness     Prior cancer treatment:  - TURBT 1/10/25    Implanted devices:  none    Aman Vasquez is a 75 y.o. male with high risk prostate cancer, Christine 4+4, Grade Group 4, pre-bx PSA 32.064, cT2 N1M1. Also has non-muscle invasive high grade papillary urothelial carcinoma of the bladder. Patient presented with BPH symptoms and elevated PSA of 22.541 on 7/31/24. Urology noted a firm left prostate nodule on 11/6/24. Prostate MRI on 11/26/24 showed 51.7 cc prostate, PR5 5.2 cm lesion in left PZ from base to apex, and right PZ at mid gland with probable EPE, enlarged right external iliac lymph node, no SV invasion or pelvic osseous metastatic disease, also right sided bladder lesion concerning for urothelial carcinoma.Repeat PSA on 12/2/24 was 32.064. Prostate biopsy on 12/6/24 showed 12/12 cores positive, max [place] 4+4. PSMA PET on 12/31/24 showed multifocal areas of increased activity in prostate, particularly right posterolateral PZ, and multiple avid lower abdominal and pelvic lymph nodes. Cystoscopy with TURBT on 1/10/25 showed a 1.5 cm papillary tumor with surrounding sessile tumor along the right lateral wall of the bladder, and additional areas of sessile erythema along left lateral wall and right dome of bladder. Pathology showed right lateral wall high grade papillary urothelial carcinoma, cannot exclude focal superficial lamina propria invasion with benign muscularis propria present, and left lateral wall biopsy showing urothelial carcinoma in-situ. Referred to discuss radiotherapy.    He is doing well today. No symptoms from his recently diagnosed cancers. No hematuria. He has been intentionally losing weight. No changes in urinary or bowel symptoms. No fevers.    Review of Systems:  Review of Systems    Constitutional:  Negative for activity change, appetite change and fatigue.   HENT: Negative.     Eyes: Negative.    Respiratory: Negative.     Cardiovascular: Negative.    Gastrointestinal:  Negative for blood in stool, constipation, diarrhea, nausea and vomiting.        Reports has hemroids   Endocrine: Negative.    Genitourinary:  Positive for difficulty urinating, dysuria (Reports decreased since 1/10/25 Turbt), frequency and urgency. Negative for hematuria.   Musculoskeletal: Negative.    Skin: Negative.    Allergic/Immunologic: Negative.    Neurological: Negative.    Hematological: Negative.    Psychiatric/Behavioral: Negative.      IPSS Questionnaire (AUA-7):  Over the past month…     1)  How often have you had a sensation of not emptying your bladder completely after you finish urinating?  0 - Not at all   2)  How often have you had to urinate again less than two hours after you finished urinating? 2 - Less than half the time   3)  How often have you found you stopped and started again several times when you urinated?  1 - Less than 1 time in 5   4) How difficult have you found it to postpone urination?  3 - About half the time   5) How often have you had a weak urinary stream?  0 - Not at all   6) How often have you had to push or strain to begin urination?  0 - Not at all   7) How many times did you most typically get up to urinate from the time you went to bed until the time you got up in the morning?  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)     1/21/2025 -  Cancer Staged    Staging form: Urinary Bladder, AJCC 8th Edition  - Pathologic: pT1, cN0, cM0 - Signed by Lisette Jones MD on 1/21/2025  Stage used in treatment planning: Yes  National guidelines used in treatment planning: Yes  Type of national guideline used in treatment planning: NCCN         Past Medical History:   Diagnosis Date    Colon polyp     Fatty liver     Hemorrhoids     History of transfusion 2014    Kidney stone     on PET scan    Prostate cancer (HCC)      Past Surgical History:   Procedure Laterality Date    COLONOSCOPY      HEMORROIDECTOMY      OR CYSTO W/REMOVAL OF LESIONS SMALL N/A 1/10/2025    Procedure: (TURBT);  Surgeon: Memo Falcon MD;  Location: [facility];  Service: Urology     Family History   Problem Relation Age of Onset    Cancer Mother     Bone cancer Mother     Lung cancer Father     Breast cancer Daughter      Social History     Tobacco Use    Smoking status: Never    Smokeless tobacco: Never   Vaping Use    Vaping status: Never Used   Substance and Sexual Activity    Alcohol use: Not Currently    Drug use: Never    Sexual activity: Not on file     Current Outpatient Medications on File Prior to Visit   Medication Sig Dispense Refill    diazepam (VALIUM) 2 mg tablet Take 1 tablet (2 mg total) by mouth 1 (one) time for 1 dose Take prior to prostate biopsy (Patient not taking: Reported on 1/22/2025) 1 tablet 0    ibuprofen (MOTRIN) 600 mg tablet Take 1 tablet (600 mg total) by mouth every 8 (eight) hours as needed for moderate pain (Patient not taking: Reported on 1/22/2025) 30 tablet 0    mupirocin (BACTROBAN) 2 % ointment Apply twice a day for two weeks (Patient not taking: Reported on 1/22/2025) 30 g 0    oxybutynin (DITROPAN-XL) 10 MG 24 hr tablet Take 1 tablet (10 mg total) by mouth daily at bedtime Take as needed for bladder  "spasms from [place] (Patient not taking: Reported on 1/22/2025) 5 tablet 0    phenazopyridine (PYRIDIUM) 200 mg tablet Take 1 tablet (200 mg total) by mouth 3 (three) times a day with meals (Patient not taking: Reported on 1/22/2025) 10 tablet 0    triamcinolone (KENALOG) 0.1 % ointment Apply topically 2 (two) times a day Apply to affected area twice daily for 2 weeks and then taper to 3 times a week as needed (Patient not taking: Reported on 1/22/2025) 80 g 2     No current facility-administered medications on file prior to visit.     No Known Allergies    Objective   /79 (BP Location: Left arm)   Pulse 68   Temp (!) 97.1 °F (36.2 °C) (Temporal)   Resp 17   Ht 6' 5\" (1.956 m)   Wt 116 kg (256 lb) Comment: Patient reported  SpO2 98%   BMI 30.36 kg/m²     Pain Screening:  Pain Score: 0-No pain   ECOG ECOG Performance Status: 0 - Fully active, able to carry on all pre-disease performance without restriction  Physical Exam  Vitals and nursing note reviewed.   Constitutional:       General: He is not in acute distress.     Appearance: Normal appearance. He is not ill-appearing or toxic-appearing.   HENT:      Head: Normocephalic and atraumatic.      Right Ear: External ear normal.      Left Ear: External ear normal.      Nose: Nose normal.   Eyes:      Conjunctiva/sclera: Conjunctivae normal.   Cardiovascular:      Rate and Rhythm: Normal rate and regular rhythm.      Pulses: Normal pulses.      Heart sounds: Normal heart sounds. No murmur heard.     No friction rub. No gallop.   Pulmonary:      Effort: Pulmonary effort is normal. No respiratory distress.      Breath sounds: Normal breath sounds. No stridor. No wheezing, rhonchi or rales.   Abdominal:      General: Bowel sounds are normal. There is no distension.      Palpations: Abdomen is soft. There is no mass.      Tenderness: There is no abdominal tenderness. There is no guarding.   Musculoskeletal:      Right lower leg: No edema.      Left lower leg: No " edema.   Lymphadenopathy:      Cervical: No cervical adenopathy.   Skin:     General: Skin is warm and dry.      Capillary Refill: Capillary refill takes less than 2 seconds.   Neurological:      General: No focal deficit present.      Mental Status: He is alert.      Cranial Nerves: No cranial nerve deficit.   Psychiatric:         Mood and Affect: Mood normal.         Behavior: Behavior normal.         Thought Content: Thought content normal.         Judgment: Judgment normal.        Lab Results   Component Value Date    WBC 6.00 2024    HGB 14.4 2024    HCT 44.0 2024    MCV 97 2024     (L) 2024       Chemistry        Component Value Date/Time    K 4.2 2024 0854     (H) 2024 0854    CO2 22 2024 0854    BUN 18 2024 0854    CREATININE 0.93 2024 0854        Component Value Date/Time    CALCIUM 9.1 2024 0854    ALKPHOS 81 05/10/2024 1124    AST 18 05/10/2024 1124    ALT 17 05/10/2024 1124          Lab Results   Component Value Date    PSA 32.064 (H) 2024    PSA 22.541 (H) 2024       Imagin24 MULTIPARAMETRIC MRI OF THE PROSTATE WITH AND WITHOUT CONTRAST-WITH 3-D POSTPROCESSING     INDICATION: R97.20: Elevated prostate specific antigen (PSA).     PSA LEVEL: 22.5 ng/mL on 2024.  PRIOR BIOPSY DATE: No prior biopsy.  BIOPSY RESULTS: Not applicable.     COMPARISON: None.     TECHNIQUE: Multiparametric MRI of the prostate was performed with and without contrast     CONTRAST: Gadobutrol (Gadavist) 12 mL of Gadobutrol injection (SINGLE-DOSE)     TECHNICAL LIMITATIONS: None.     FINDINGS:     PROSTATE:  Size: 5.6 x 3.8 x 4.3 cm = 51.7 mL. PSAD= 0.44 ng/mL2  Hemorrhage: None.  Benign prostatic hyperplasia (BPH): Mild.     Focal lesions as follows:  Lesion: 1  Size: 3.9 x 5.2 cm, series 450, image 15.  Location: Anterior and posterior left peripheral zone from base to apex, posterior right peripheral zone at mid  gland  T2-weighted images: Score 5: Lenticular or noncircumscribed, homogeneous, moderately hypointense but greater than or equal to 1.5 cm in greatest dimension or definite extraprostatic extension/invasive behavior.  Diffusion-weighted images: Score 5: Focal markedly hypointense on ADC and markedly hyperintense on high b-value DWI, but greater than or equal to 1.5 cm in greatest dimension or definite extraprostatic extension/invasive behavior.  Dynamic post-contrast images: (+) Focal, earlier or contemporaneous with, enhancement of adjacent normal prostatic tissues; corresponds to a finding on T2-weighted and/or DWI.  PI-RADS Assessment Category: 5, Very high (clinically significant cancer is highly likely to be present).  Extra-prostatic extension (EPE): Probable extra prostatic extension anterolaterally on the left side at base.        Note: Clinically significant cancer is defined on pathology/histology as [place] score greater than or equal to 7, and/or volume of greater than or equal to 0.5 mL, and/or extraprostatic extension.     SEMINAL VESICLES : Unremarkable.     URINARY BLADDER: 1.2 x 1.0 cm enhancing right-sided polypoid soft tissue within the bladder has restricted diffusion (series 5, image 11; series 7, image 712; series 400, image 7).     LYMPH NODES: 1.0 x 1.0 cm right external iliac lymph node (series 6, image 35     BONES: No suspicious osseous lesion.           IMPRESSION:     1. PI-RADSv2.1 Category 5 - Very high (clinically significant cancer is highly likely to be present). 5.2 cm lesion involving the left peripheral zone from base to apex and right peripheral zone at mid gland with probable extra prostatic extension.   Enlarged right external iliac lymph node. No seminal vesicle invasion or pelvic osseous metastatic disease.     2. Right-sided bladder lesion is concerning for urothelial carcinoma. Cystoscopy is advised.     3. Calculated prostate volume of 51 mL.     The study was marked in  EPIC for significant notification.     Prostate gland boundaries and areas of concern for significant prostate cancer were segmented using 3D advanced post-processing on an independent [employer] system workstation with active physician participation. The segmentation was performed should   MR-ultrasound fusion biopsy be required.                 Workstation performed: [id number]        12/31/24 ILLUCIX PSMA PET/CT SCAN     INDICATION:  C61: Malignant neoplasm of prostate  Initial staging of prostate cancer. PSA 33.064     MODIFIER: PI     COMPARISON: Prostate MRI 11/26/2024     CELL TYPE:  prostatic adenocarcinoma, [place] 8     TECHNIQUE:   4.8 mCi F-18 Pylarify PSMA administered IV. Multiplanar attenuation corrected and non attenuation corrected PET images were acquired 60 minutes post injection. Contiguous, low dose, axial CT sections were obtained from the vertex through the   femurs .   Intravenous or oral contrast was not utilized.  This examination, like all CT scans performed in the [facility], was performed utilizing techniques to minimize radiation dose exposure, including the use of iterative   reconstruction and automated exposure control.     FINDINGS:     All images refer to series 4 unless otherwise specified.     VISUALIZED BRAIN:  No acute abnormalities are seen.     HEAD/NECK:  There is a physiologic distribution of the radiotracer. No PSMA avid cervical adenopathy is seen.     CT images: Unremarkable.     CHEST:  No PSMA avid soft tissue lesions are seen.     CT images: Coronary artery calcification. Probable tiny granuloma in the anterior left upper lobe image 85.     ABDOMEN:  -There is a small soft tissue nodule located between the IVC and the adjacent psoas muscle on image 174 measuring 0.5 cm with SUV max 5.9. This is medial to the right ureter.  - There is a soft tissue nodule along the lateral margin of the right common iliac vessels image 193 measuring 0.7 cm, SUV max  9.4.  - More subtle soft tissue nodule medial to the iliac vessels on image 201 measuring 0.6 cm SUV max 10. This is likely immediately adjacent to the ureter.  - Soft tissue nodule along the posterior margin of the right iliac vessels on image 207 measures 1.1 x 1.0 cm, SUV max 17.     CT images: Bilateral nephrolithiasis. Large transverse duodenal diverticulum.     PELVIS:     Prostate: Prostatomegaly. Bilateral foci of increased PSMA activity, the largest is present within the right posterolateral peripheral zone, size based on area of activity approximately 1.9 x 1.2 cm, SUV max 19. Smaller focus noted in the left lateral   peripheral zone image 230, measures approximately 0.8 cm with SUV max 12.0. Third area of activity is seen within the left gland just lateral to the midline image 230 with SUV max 14 although to some extent this could represent urethral activity as well.     Seminal vesicles : No evidence of PSMA avid involvement.     Lymph nodes: Please refer above to abdominal section. No additional PSMA avid lymph nodes further inferiorly     Other:  No additional PSMA avid lesions are seen.     CT images: Adipose containing left inguinal hernia. Adipose containing ventral hernia. Scattered colonic diverticula.     OSSEOUS STRUCTURES:  No PSMA avid lesions are seen.     CT images: No significant findings.     IMPRESSION:     1. Prostatomegaly with multifocal areas of increased PSMA activity particularly in the right posterolateral peripheral zone in keeping with recent biopsy-proven prostate cancer  2. Radiotracer avid taisha metastases in the lower abdomen and right pelvis  3. No evidence of seminal vesicle involvement or PSMA avid osseous metastasis  4. Bilateral nephrolithiasis        Workstation performed: [id number]        Pathology:  Case Report   Surgical Pathology Report                         Case: [id number]                                   Authorizing Provider:  Memo Falcon MD        Collected:           12/06/2024 0914               Ordering Location:     [facility]        Received:            12/06/2024 0914                                      [facility] [place]                                                             Pathologist:           Pavan Tavarez MD                                                       Specimens:   A) - Prostate, L Lat [place]                                                                            B) - Prostate, L Base                                                                                C) - Prostate, L Lat Mid                                                                            D) - Prostate, L Mid                                                                                E) - Prostate, L Lat [place]                                                                            F) - Prostate, L [place]                                                                                G) - Prostate, R Lat [place]                                                                            H) - Prostate, R Base                                                                                I) - Prostate, R Lat Mid                                                                            J) - Prostate, R Mid                                                                                K) - Prostate, R Lat [place]                                                                            L) - Prostate, R [place]                                                                      Addendum   The purpose of this supplemental report is to add the result of immunostains performed on this tumor     Note:  Tumor cells are  negative for  [place] ( colonic marker). Controls reacted appropriately This staining pattern supports the above diagnosis and support he diagnosis of Prostatic adenocarcinoma   Addendum electronically signed by Pavan Tavarez MD on  12/17/2024 at 1442 EST   Final Diagnosis   A. Prostate, L Lat [place], needle Biopsy:   - Adenocarcinoma , immunohistochemically compatible with Prostatic adenocarcinoma  -[place] grade  4 +3  = score  7  -Cribriform pattern: present  -Intraductal carcinoma: present  -Tumor involves one submitted core  -Tumor involves approximately 95% of  core biopsy  - Perineural invasion: Not identified        B. Prostate, L Base, needle Biopsy:   - Adenocarcinoma , immunohistochemically compatible with Prostatic adenocarcinoma  -[place] grade 4+ 4 = score  8  -Cribriform pattern: present  -Intraductal carcinoma: present  -Tumor involves one submitted core  -Tumor involves approximately 80% of  core biopsy  - Perineural invasion: Not identified  Note  A small component of grade 5 can not be ruled out      C. Prostate, L Lat Mid, needle Biopsy:   Adenocarcinoma , compatible with Prostatic adenocarcinoma  -[place] grade 4 + 3=  score  7  -Cribriform pattern: present  -Tumor involves one submitted core  -Tumor involves approximately 95% of  core biopsy  - Perineural invasion: Not identified        D. Prostate, L Mid, needle Biopsy:   - Adenocarcinoma , immunohistochemically compatible with Prostatic adenocarcinoma  -[place] grade 4 + 3=  score  7   Cribriform pattern: present  -Tumor involves one submitted core  -Tumor  discontinuously involves approximately 95% of  core biopsy  - Perineural invasion: present        E. Prostate, L Lat [place], needle Biopsy:   Adenocarcinoma , compatible with Prostatic adenocarcinoma  -[place] grade 4 + 3=  score  7  Cribriform pattern: present  -Tumor involves one submitted core  -Tumor  discontinuously involves approximately 95% of  core biopsy  - Perineural invasion: present        F. Prostate, L [place], [place]le Biopsy:   - Adenocarcinoma , immunohistochemically compatible with Prostatic adenocarcinoma  -[place] grade 4 + 3=  score  7   Cribriform pattern: present  -Tumor involves one submitted  core  -Tumor  discontinuously involves approximately 90% of  core biopsy  - Perineural invasion: present  Note  A small component of grade 5 can not be ruled out         G. Prostate, R Lat [place], needle Biopsy:   Adenocarcinoma , compatible with Prostatic adenocarcinoma  -[place]ine grade 4 + 4 =  score  8  Cribriform pattern: present  -Tumor involves one submitted core  -Tumor  discontinuously involves approximately 90% of  core biopsy  - Perineural invasion: present        H. Prostate, R Base,  needle Biopsy:   - Adenocarcinoma , immunohistochemically compatible with Prostatic adenocarcinoma  -Christine grade 4 + 4 =  score  8  Cribriform pattern: present  -Tumor involves one submitted core  -Tumor  discontinuously involves approximately 90% of  core biopsy  - Perineural invasion: present        I. Prostate, R Lat Mid, needle Biopsy:   - Adenocarcinoma , immunohistochemically compatible with Prostatic adenocarcinoma  -Christine grade 4 + 4 =  score  8  Cribriform pattern: not identifed  -Tumor involves one submitted core  -Tumor  discontinuously involves approximately 90% of  core biopsy  - Perineural invasion: not identifed        J. Prostate, R Mid, needle Biopsy:   Adenocarcinoma , compatible with Prostatic adenocarcinoma  -[place] grade 4 + 3=  score  7  -Cribriform Pattern: Not identified  -Tumor involves one submitted core  -Tumor involves approximately 30% of  core biopsy  - Perineural invasion: Not identified        K. Prostate, R Lat [place], needle Biopsy:   Adenocarcinoma , compatible with Prostatic adenocarcinoma  -Christine grade 4 + 3=  score  7  -Cribriform Pattern: Not identified  -Tumor involves one submitted core  -Tumor discontinuously  involves approximately 90% of  core biopsy  - Perineural invasion: Not identified        L. Prostate, R [place], needle Biopsy:   Adenocarcinoma , compatible with Prostatic adenocarcinoma  -Christine grade 3 + 3=  score  6  Tumor involves one submitted core  -Tumor discontinuously   involves approximately 60% of  core biopsy  - Perineural invasion: Not identified     Note  Tumor cells are  positive for Prostatic marker (NKX3.1 ( diffuse) , PSAP ) and CDX2 ( patchy nuclear staining), P16 ( patchy cytoplasmic  staining) and negative for CK20  (colon marker) . Stains performed on multiple blocks Controls reacted appropriately      Based on this staining pattern and combined with high serum PSA, the tumor is consistent with Prostatic adenocarcinoma , however  because of positive staining for ( CDX2, P16)  extension from other organs including colorectal rectal carcinoma can not be  ruled out. Additional investigations including GI endoscopy  are recommend for definite diagnosis   Electronically signed by Pavan Tavarez MD on 12/13/2024 at 1456 EST   Note    Block H1 and  I1 contain many tumor cells could be used for future molecular testing if clinically requested.   Intradepartmental consultation is in agreement (case conference)   Interpretation performed at [facility], [address] [place] [address]    Dr Memo Falcon was notified by EPIC message on 12/ 11/24 at 10:40 AM and a copy of this report was emailed to his Office            Clinical data  Lab data notes    PSA, Diagnostic 32.064 High        Additional Information    All reported additional testing was performed with appropriately reactive controls.  These tests were developed and their performance characteristics determined by [facility] or appropriate performing facility, though some tests may be performed on tissues which have not been validated for performance characteristics (such as staining performed on alcohol exposed cell blocks and decalcified tissues).  Results should be interpreted with caution and in the context of the patients’ clinical condition. These tests may not be cleared or approved by the U.S. Food and Drug Administration, though the FDA has determined  "that such clearance or approval is not necessary. These tests are used for clinical purposes and they should not be regarded as investigational or for research. This laboratory has been approved by [place] 88, designated as a high-complexity laboratory and is qualified to perform these tests.  .   Gross Description    A. The specimen is received in formalin, labeled with the patient's name and hospital number, and is designated \" prostate, left lateral base\".  The specimen consists of a single core of white soft tissue having a diameter of less than 0.1 cm and a length of 1.9 cm.  Entirely submitted. One cassette. One core per cassette. Between sponges.  B. The specimen is received in formalin, labeled with the patient's name and hospital number, and is designated \" prostate, left base\".  The specimen consists of a single core of white soft tissue having a diameter of less than 0.1 cm and a length of 1.8 cm.  Entirely submitted. One cassette. One core per cassette. Between sponges.  C. The specimen is received in formalin, labeled with the patient's name and hospital number, and is designated \" prostate, left lateral mid\".  The specimen consists of a single core of white soft tissue having a diameter of less than 0.1 cm and a length of 1.9 cm.  Entirely submitted. One cassette. One core per cassette. Between sponges.  D. The specimen is received in formalin, labeled with the patient's name and hospital number, and is designated \" prostate, left mid\".  The specimen consists of a single core of white soft tissue having a diameter of less than 0.1 cm and a length of 2.0 cm.  Entirely submitted. One cassette. One core per cassette. Between sponges.  E. The specimen is received in formalin, labeled with the patient's name and hospital number, and is designated \" prostate, left lateral apex\".  The specimen consists of a single core of white soft tissue having a diameter of less than 0.1 cm and a length of 1.9 cm.  Entirely " "submitted. One cassette. One core per cassette. Between sponges.  F. The specimen is received in formalin, labeled with the patient's name and hospital number, and is designated \" prostate, left apex\".  The specimen consists of a single core of white soft tissue having a diameter of less than 0.1 cm and a length of 1.4 cm.  Entirely submitted. One cassette. One core per cassette. Between sponges.  G. The specimen is received in formalin, labeled with the patient's name and hospital number, and is designated \" prostate, right lateral base\".  The specimen consists of a single core of white soft tissue having a diameter of less than 0.1 cm and a length of 1.6 cm.  Entirely submitted. One cassette. One core per cassette. Between sponges.  H. The specimen is received in formalin, labeled with the patient's name and hospital number, and is designated \" prostate, right base\".  The specimen consists of a single core of white soft tissue having a diameter of less than 0.1 cm and a length of 1.7 cm.  Entirely submitted. One cassette. One core per cassette. Between sponges.  I. The specimen is received in formalin, labeled with the patient's name and hospital number, and is designated \" prostate, right lateral mid\".  The specimen consists of a single core of white soft tissue having a diameter of less than 0.1 cm and a length of 1.8 cm.  Entirely submitted. One cassette. One core per cassette. Between sponges.  J. The specimen is received in formalin, labeled with the patient's name and hospital number, and is designated \" prostate, right mid\".  The specimen consists of a single core of white soft tissue having a diameter of less than 0.1 cm and a length of 1.6 cm.  Entirely submitted. One cassette. One core per cassette. Between sponges.  K. The specimen is received in formalin, labeled with the patient's name and hospital number, and is designated \" prostate, right lateral apex\".  The specimen consists of a single core of " "white soft tissue having a diameter of less than 0.1 cm and a length of 1.2 cm.  Entirely submitted. One cassette. One core per cassette. Between sponges.  L. The specimen is received in formalin, labeled with the patient's name and hospital number, and is designated \" prostate, right apex\".  The specimen consists of a single core of white soft tissue having a diameter of less than 0.1 cm and a length of 1.7 cm.  Entirely submitted. One cassette. One core per cassette. Between sponges.     Note: The estimated total formalin fixation time based upon information provided by the submitting clinician and the standard processing schedule is 14.50 hours.     [facility]   Resulting Agency [facility]             Specimen Collected: 12/06/24 09:14 Last Resulted: 12/17/24 14:42         Case Report   Surgical Pathology Report                         Case: [id number]                                   Authorizing Provider:  Memo Falcon MD       Collected:           01/10/2025 1226               Ordering Location:     [facility]        Received:            01/10/2025 [address] [place] [address] Operating Room                                                     Pathologist:           Pam Ragland,                                                      Specimens:   A) - Urinary Bladder, right lateral wall bladder                                                    B) - Urinary Bladder, deep margin                                                                    C) - Urinary Bladder, left lateral bladder wall                                                      D) - Urinary Bladder, right dome of bladder                                                Final Diagnosis   A. Bladder, Right Lateral Wall, Biopsy:  - High-grade papillary urothelial carcinoma with inverted growth pattern; cannot exclude focal superficial lamina propria invasion.  - Benign muscularis propria present.     B. " Bladder, Deep Margin, Biopsy:  - Portions of muscularis propria with chronic inflammation and reactive changes.     C. 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  - Invasion not seen on H&E.  - Benign muscularis propria present.     D. Bladder, Right Dome, Biopsy:  - Rare detached atypical urothelial cells present. See Note.  - Abundant denuded urothelial mucosa with marked reactive changes   Electronically signed by Pam Ragland DO on 1/16/2025 at 0915 EST   Note    Note D: AE1/AE3 and GATA3 immunohistochemical stains highlight the detached atypical urothelial cells. Denudation may sometimes reflect cases of CIS where the neoplastic cells have shed as a result of discohesion. Additional tissue and/or cytology sampling may be helpful if clinically indicated.      Intradepartmental consultation is in agreement (CV).   Additional Information    All reported additional testing was performed with appropriately reactive controls.  These tests were developed and their performance characteristics determined by [facility] or appropriate performing facility, though some tests may be performed on tissues which have not been validated for performance characteristics (such as staining performed on alcohol exposed cell blocks and decalcified tissues).  Results should be interpreted with caution and in the context of the patients’ clinical condition. These tests may not be cleared or approved by the U.S. Food and Drug Administration, though the FDA has determined that such clearance or approval is not necessary. These tests are used for clinical purposes and they should not be regarded as investigational or for research. This laboratory has been approved by CLIA 88, designated as a high-complexity laboratory and is qualified to perform these tests.   Gross Description    A. The specimen is received in formalin, labeled with the patient's name and hospital number, and  "is designated \" right lateral wall bladder\".  The specimen consists of multiple tan-pink soft tissue fragments measuring in aggregate of 4.2 x 1.6 x 0.4 cm.  The specimen is drained into embedding bags.  Entirely submitted. Two cassettes.  B. The specimen is received in formalin, labeled with the patient's name and hospital number, and is designated \" urinary bladder deep margin\".  The specimen consists of multiple tan-brown soft tissue fragments measuring in aggregate of 1.8 x 1.0 x 0.3 cm.  The specimen is drained into an embedding bag.  Entirely submitted. One cassette.  C. The specimen is received in formalin, labeled with the patient's name and hospital number, and is designated \" left lateral bladder wall\".  The specimen consists of multiple tan-pink soft tissue fragments measuring in aggregate of 1.7 x 0.8 x 0.3 cm.  The specimen is drained into an embedding bag.  Entirely submitted. One cassette.  D. The specimen is received in formalin, labeled with the patient's name and hospital number, and is designated \" right dome of bladder\".  The specimen consists of multiple tan-brown soft tissue fragments measuring in aggregate of 1.1 x 0.6 x 0.2 cm.  The specimen is drained into an embedding bag.  Entirely submitted. One cassette.     Note: The estimated total formalin fixation time based upon information provided by the submitting clinician and the standard processing schedule is under 72 hours.     -[facility]             Specimen Collected: 01/10/25 12:26 Last Resulted: 01/16/25 09:15           Lisette Jones MD 01/22/25 9:47 AM  "

## 2025-01-22 ENCOUNTER — CONSULT (OUTPATIENT)
Dept: RADIATION ONCOLOGY | Facility: CLINIC | Age: 75
End: 2025-01-22
Payer: COMMERCIAL

## 2025-01-22 VITALS
SYSTOLIC BLOOD PRESSURE: 128 MMHG | HEIGHT: 77 IN | WEIGHT: 256 LBS | TEMPERATURE: 97.1 F | RESPIRATION RATE: 17 BRPM | HEART RATE: 68 BPM | OXYGEN SATURATION: 98 % | BODY MASS INDEX: 30.23 KG/M2 | DIASTOLIC BLOOD PRESSURE: 79 MMHG

## 2025-01-22 DIAGNOSIS — C61 PROSTATE CANCER (HCC): ICD-10-CM

## 2025-01-22 DIAGNOSIS — C67.9 UROTHELIAL CARCINOMA OF BLADDER WITHOUT INVASION OF MUSCLE (HCC): Primary | ICD-10-CM

## 2025-01-22 DIAGNOSIS — C61 PRIMARY PROSTATE ADENOCARCINOMA (HCC): ICD-10-CM

## 2025-01-22 PROCEDURE — 99205 OFFICE O/P NEW HI 60 MIN: CPT | Performed by: STUDENT IN AN ORGANIZED HEALTH CARE EDUCATION/TRAINING PROGRAM

## 2025-01-22 PROCEDURE — G2211 COMPLEX E/M VISIT ADD ON: HCPCS | Performed by: STUDENT IN AN ORGANIZED HEALTH CARE EDUCATION/TRAINING PROGRAM

## 2025-01-22 PROCEDURE — 99211 OFF/OP EST MAY X REQ PHY/QHP: CPT | Performed by: STUDENT IN AN ORGANIZED HEALTH CARE EDUCATION/TRAINING PROGRAM

## 2025-01-22 NOTE — PROGRESS NOTES
Aman Vasquez 1950 is a 75 y.o. maleWith recently diagnosed clinical stage IVB (cT2, cN1, cM1, PSA:32 grade group 4) prostate adenocarcinoma.  Presents in consultation for discussion of RT for pelvic and prostate. Referred by .      He was also recently diagnosed with Urothelial carcinoma of bladder without invasion of muscle.     PMH includes: BPH with urinary obstruction. He has a family history of cancer in his mother, father, and daughter. No smoking history.      PSA   Latest Ref Rng 0.000 - 4.000 ng/mL   7/31/2024 22.541 (H)    12/2/2024 32.064 (H)         11/26/24 MRI prostate multiparametric   IMPRESSION:     1. PI-RADSv2.1 Category 5 - Very high (clinically significant cancer is highly likely to be present). 5.2 cm lesion involving the left peripheral zone from base to apex and right peripheral zone at mid gland with probable extra prostatic extension.   Enlarged right external iliac lymph node. No seminal vesicle invasion or pelvic osseous metastatic disease.     2. Right-sided bladder lesion is concerning for urothelial carcinoma. Cystoscopy is advised.     3. Calculated prostate volume of 51 mL.     12/6/24 Tissue Exam  A. Prostate, L Lat [place], needle Biopsy:   - Adenocarcinoma , immunohistochemically compatible with Prostatic adenocarcinoma  -Christine grade  4 +3  = score  7  -Cribriform pattern: present  -Intraductal carcinoma: present  -Tumor involves one submitted core  -Tumor involves approximately 95% of  core biopsy  - Perineural invasion: Not identified        B. Prostate, L Base, needle Biopsy:   - Adenocarcinoma , immunohistochemically compatible with Prostatic adenocarcinoma  -Christine grade 4+ 4 = score  8  -Cribriform pattern: present  -Intraductal carcinoma: present  -Tumor involves one submitted core  -Tumor involves approximately 80% of  core biopsy  - Perineural invasion: Not identified  Note  A small component of grade 5 can not be ruled out      C. Prostate, L Lat Mid, needle  Biopsy:   Adenocarcinoma , compatible with Prostatic adenocarcinoma  -[place] grade 4 + 3=  score  7  -Cribriform pattern: present  -Tumor involves one submitted core  -Tumor involves approximately 95% of  core biopsy  - Perineural invasion: Not identified        D. Prostate, L Mid, needle Biopsy:   - Adenocarcinoma , immunohistochemically compatible with Prostatic adenocarcinoma  -[place] grade 4 + 3=  score  7   Cribriform pattern: present  -Tumor involves one submitted core  -Tumor  discontinuously involves approximately 95% of  core biopsy  - Perineural invasion: present        E. Prostate, L Lat [place], needle Biopsy:   Adenocarcinoma , compatible with Prostatic adenocarcinoma  -Christine grade 4 + 3=  score  7  Cribriform pattern: present  -Tumor involves one submitted core  -Tumor  discontinuously involves approximately 95% of  core biopsy  - Perineural invasion: present        F. Prostate, L [place], eedle Biopsy:   - Adenocarcinoma , immunohistochemically compatible with Prostatic adenocarcinoma  -[place] grade 4 + 3=  score  7   Cribriform pattern: present  -Tumor involves one submitted core  -Tumor  discontinuously involves approximately 90% of  core biopsy  - Perineural invasion: present  Note  A small component of grade 5 can not be ruled out         G. Prostate, R Lat Base, needle Biopsy:   Adenocarcinoma , compatible with Prostatic adenocarcinoma  -[place] grade 4 + 4 =  score  8  Cribriform pattern: present  -Tumor involves one submitted core  -Tumor  discontinuously involves approximately 90% of  core biopsy  - Perineural invasion: present        H. Prostate, R Base,  needle Biopsy:   - Adenocarcinoma , immunohistochemically compatible with Prostatic adenocarcinoma  -[place] grade 4 + 4 =  score  8  Cribriform pattern: present  -Tumor involves one submitted core  -Tumor  discontinuously involves approximately 90% of  core biopsy  - Perineural invasion: present        I. Prostate, R Lat Mid, needle Biopsy:    - Adenocarcinoma , immunohistochemically compatible with Prostatic adenocarcinoma  -Christine grade 4 + 4 =  score  8  Cribriform pattern: not identifed  -Tumor involves one submitted core  -Tumor  discontinuously involves approximately 90% of  core biopsy  - Perineural invasion: not identifed        J. Prostate, R Mid, needle Biopsy:   Adenocarcinoma , compatible with Prostatic adenocarcinoma  -Christine grade 4 + 3=  score  7  -Cribriform Pattern: Not identified  -Tumor involves one submitted core  -Tumor involves approximately 30% of  core biopsy  - Perineural invasion: Not identified        K. Prostate, R Lat [place], needle Biopsy:   Adenocarcinoma , compatible with Prostatic adenocarcinoma  -[place] grade 4 + 3=  score  7  -Cribriform Pattern: Not identified  -Tumor involves one submitted core  -Tumor discontinuously  involves approximately 90% of  core biopsy  - Perineural invasion: Not identified        L. Prostate, R [place], needle Biopsy:   Adenocarcinoma , compatible with Prostatic adenocarcinoma  -Christine grade 3 + 3=  score  6  Tumor involves one submitted core  -Tumor discontinuously  involves approximately 60% of  core biopsy  - Perineural invasion: Not identified    12/20/24    Discussed PSMA PET Scan ordered, he has high risk prostate cancer. He is scheduled for TURBT ON 1/10/25.     12/31/24 NM PET CT skull base to midthigh   IMPRESSION:     1. Prostatomegaly with multifocal areas of increased PSMA activity particularly in the right posterolateral peripheral zone in keeping with recent biopsy-proven prostate cancer  2. Radiotracer avid taisha metastases in the lower abdomen and right pelvis  3. No evidence of seminal vesicle involvement or PSMA avid osseous metastasis  4. Bilateral nephrolithiasis    1/10/25 Tissue Exam   A. Bladder, Right Lateral Wall, Biopsy:  - High-grade papillary urothelial carcinoma with inverted growth pattern; cannot exclude focal superficial lamina propria invasion.  -  Benign muscularis propria present.     B. Bladder, Deep Margin, Biopsy:  - Portions of muscularis propria with chronic inflammation and reactive changes.     C. 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  - Invasion not seen on H&E.  - Benign muscularis propria present.     D. Bladder, Right Dome, Biopsy:  - Rare detached atypical urothelial cells present. See Note.  - Abundant denuded urothelial mucosa with marked reactive changes    Upcoming   1/27/25   1/29/25          Oncology History Overview Note   With recently diagnosed clinical stage IVB (cT2, cN1, cM1, PSA:32 grade group 4) prostate adenocarcinoma.  Presents in consultation for discussion of RT for pelvic and prostate. Referred by .      He was also recently diagnosed with Urothelial carcinoma of bladder without invasion of muscle.     PMH includes: BPH with urinary obstruction. He has a family history of cancer in his mother, father, and daughter. No smoking history.      PSA   Latest Ref Rng 0.000 - 4.000 ng/mL   7/31/2024 22.541 (H)    12/2/2024 32.064 (H)         11/26/24 MRI prostate multiparametric   IMPRESSION:     1. PI-RADSv2.1 Category 5 - Very high (clinically significant cancer is highly likely to be present). 5.2 cm lesion involving the left peripheral zone from base to apex and right peripheral zone at mid gland with probable extra prostatic extension.   Enlarged right external iliac lymph node. No seminal vesicle invasion or pelvic osseous metastatic disease.     2. Right-sided bladder lesion is concerning for urothelial carcinoma. Cystoscopy is advised.     3. Calculated prostate volume of 51 mL.     12/6/24 Tissue Exam  A. Prostate, L Lat Base, needle Biopsy:   - Adenocarcinoma , immunohistochemically compatible with Prostatic adenocarcinoma  -Christine grade  4 +3  = score  7  -Cribriform pattern: present  -Intraductal carcinoma: present  -Tumor involves one submitted core  -Tumor  involves approximately 95% of  core biopsy  - Perineural invasion: Not identified        B. Prostate, L Base, needle Biopsy:   - Adenocarcinoma , immunohistochemically compatible with Prostatic adenocarcinoma  -[place] grade 4+ 4 = score  8  -Cribriform pattern: present  -Intraductal carcinoma: present  -Tumor involves one submitted core  -Tumor involves approximately 80% of  core biopsy  - Perineural invasion: Not identified  Note  A small component of grade 5 can not be ruled out      C. Prostate, L Lat Mid, needle Biopsy:   Adenocarcinoma , compatible with Prostatic adenocarcinoma  -[place] grade 4 + 3=  score  7  -Cribriform pattern: present  -Tumor involves one submitted core  -Tumor involves approximately 95% of  core biopsy  - Perineural invasion: Not identified        D. Prostate, L Mid, needle Biopsy:   - Adenocarcinoma , immunohistochemically compatible with Prostatic adenocarcinoma  -[place] grade 4 + 3=  score  7   Cribriform pattern: present  -Tumor involves one submitted core  -Tumor  discontinuously involves approximately 95% of  core biopsy  - Perineural invasion: present        E. Prostate, L Lat [place], needle Biopsy:   Adenocarcinoma , compatible with Prostatic adenocarcinoma  -Christine grade 4 + 3=  score  7  Cribriform pattern: present  -Tumor involves one submitted core  -Tumor  discontinuously involves approximately 95% of  core biopsy  - Perineural invasion: present        F. Prostate, L [place], eedle Biopsy:   - Adenocarcinoma , immunohistochemically compatible with Prostatic adenocarcinoma  -Christine grade 4 + 3=  score  7   Cribriform pattern: present  -Tumor involves one submitted core  -Tumor  discontinuously involves approximately 90% of  core biopsy  - Perineural invasion: present  Note  A small component of grade 5 can not be ruled out         G. Prostate, R Lat [place], needle Biopsy:   Adenocarcinoma , compatible with Prostatic adenocarcinoma  -[place] grade 4 + 4 =  score  8  Cribriform  pattern: present  -Tumor involves one submitted core  -Tumor  discontinuously involves approximately 90% of  core biopsy  - Perineural invasion: present        H. Prostate, R Base,  needle Biopsy:   - Adenocarcinoma , immunohistochemically compatible with Prostatic adenocarcinoma  -Christine grade 4 + 4 =  score  8  Cribriform pattern: present  -Tumor involves one submitted core  -Tumor  discontinuously involves approximately 90% of  core biopsy  - Perineural invasion: present        I. Prostate, R Lat Mid, needle Biopsy:   - Adenocarcinoma , immunohistochemically compatible with Prostatic adenocarcinoma  -Christine grade 4 + 4 =  score  8  Cribriform pattern: not identifed  -Tumor involves one submitted core  -Tumor  discontinuously involves approximately 90% of  core biopsy  - Perineural invasion: not identifed        J. Prostate, R Mid, needle Biopsy:   Adenocarcinoma , compatible with Prostatic adenocarcinoma  -[place] grade 4 + 3=  score  7  -Cribriform Pattern: Not identified  -Tumor involves one submitted core  -Tumor involves approximately 30% of  core biopsy  - Perineural invasion: Not identified        K. Prostate, R Lat [place], needle Biopsy:   Adenocarcinoma , compatible with Prostatic adenocarcinoma  -[place] grade 4 + 3=  score  7  -Cribriform Pattern: Not identified  -Tumor involves one submitted core  -Tumor discontinuously  involves approximately 90% of  core biopsy  - Perineural invasion: Not identified        L. Prostate, R [place], needle Biopsy:   Adenocarcinoma , compatible with Prostatic adenocarcinoma  -Christine grade 3 + 3=  score  6  Tumor involves one submitted core  -Tumor discontinuously  involves approximately 60% of  core biopsy  - Perineural invasion: Not identified    12/20/24    Discussed PSMA PET Scan ordered, he has high risk prostate cancer. He is scheduled for TURBT ON 1/10/25.     12/31/24 NM PET CT skull base to midthigh   IMPRESSION:     1. Prostatomegaly with multifocal areas  of increased PSMA activity particularly in the right posterolateral peripheral zone in keeping with recent biopsy-proven prostate cancer  2. Radiotracer avid taisha metastases in the lower abdomen and right pelvis  3. No evidence of seminal vesicle involvement or PSMA avid osseous metastasis  4. Bilateral nephrolithiasis    1/10/25 Tissue Exam   A. Bladder, Right Lateral Wall, Biopsy:  - High-grade papillary urothelial carcinoma with inverted growth pattern; cannot exclude focal superficial lamina propria invasion.  - Benign muscularis propria present.     B. Bladder, Deep Margin, Biopsy:  - Portions of muscularis propria with chronic inflammation and reactive changes.     C. 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  - Invasion not seen on H&E.  - Benign muscularis propria present.     D. Bladder, Right Dome, Biopsy:  - Rare detached atypical urothelial cells present. See Note.  - Abundant denuded urothelial mucosa with marked reactive changes    Upcoming   1/27/25   1/29/25           Primary prostate adenocarcinoma (HCC)   1/17/2025 Initial Diagnosis    Primary prostate adenocarcinoma (HCC)     1/17/2025 -  Cancer Staged    Staging form: Prostate, AJCC 8th Edition  - Clinical: Stage IVB (cT2, cN1, cM1, PSA: 32, Grade Group: 4) - Signed by Cristofer Llamas MD on 1/17/2025  Prostate specific antigen (PSA) range: 20 or greater  Christine score: 8  Histologic grading system: 5 grade system       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)   1/17/2025 Initial Diagnosis    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)     1/21/2025 -  Cancer Staged    Staging form: Urinary Bladder, AJCC 8th Edition  - Pathologic: pT1, cN0, cM0 - Signed by Lisette Jones MD on 1/21/2025  Stage used in treatment planning: Yes  National guidelines used in treatment planning: Yes  Type of national guideline used in treatment planning: NCCN            Review of Systems:  Review of Systems   Constitutional:  Negative for activity change, appetite change and fatigue.   HENT: Negative.     Eyes: Negative.    Respiratory: Negative.     Cardiovascular: Negative.    Gastrointestinal:  Negative for blood in stool, constipation, diarrhea, nausea and vomiting.        Reports has hemroids   Endocrine: Negative.    Genitourinary:  Positive for difficulty urinating, dysuria (Reports decreased since 1/10/25 Turbt), frequency and urgency. Negative for hematuria.   Musculoskeletal: Negative.    Skin: Negative.    Allergic/Immunologic: Negative.    Neurological: Negative.    Hematological: Negative.    Psychiatric/Behavioral: Negative.         Clinical Trial: no    IPSS Questionnaire (AUA-7):  Over the past month…    1)  How often have you had a sensation of not emptying your bladder completely after you finish urinating?  0 - Not at all   2)  How often have you had to urinate again less than two hours after you finished urinating? 2 - Less than half the time   3)  How often have you found you stopped and started again several times when you urinated?  1 - Less than 1 time in 5   4) How difficult have you found it to postpone urination?  3 - About half the time   5) How often have you had a weak urinary stream?  0 - Not at all   6) How often have you had to push or strain to begin urination?  0 - Not at all   7) How many times did you most typically get up to urinate from the time you went to bed until the time you got up in the morning?  4 - 4 times   Total Score:  10       Pain assessment: 0    PSA 32.064       Prior Radiation none     Teaching NCL booklet given,SIM,Side effects     MST completed     Implantable Devices (Port, pacemaker, pain stimulator) none     Hip Replacement none    Right Ankle     Health Maintenance   Topic Date Due    Hepatitis C Screening  Never done    BMI: Followup Plan  Never done    Annual Physical  Never done    RSV Vaccine for Pregnant  Patients and Patients Age 60+ Years (1 - 1-dose 75+ series) Never done    Influenza Vaccine (1) 09/01/2024    Fall Risk  10/04/2025    Depression Screening  10/04/2025    BMI: Adult  01/17/2026    DTaP,Tdap,and Td Vaccines (3 - Td or Tdap) 02/26/2029    Zoster Vaccine  Completed    Pneumococcal Vaccine: 65+ Years  Completed    COVID-19 Vaccine  Completed    Meningococcal B Vaccine  Aged Out    RSV Vaccine age 0-20 Months  Aged Out    HIB Vaccine  Aged Out    IPV Vaccine  Aged Out    Hepatitis A Vaccine  Aged Out    Meningococcal ACWY Vaccine  Aged Out    HPV Vaccine  Aged Out       Past Medical History:   Diagnosis Date    Colon polyp     Fatty liver     Hemorrhoids     History of transfusion 2014    Kidney stone     on PET scan    Prostate cancer (HCC)        Past Surgical History:   Procedure Laterality Date    COLONOSCOPY      HEMORROIDECTOMY      IA CYSTO W/REMOVAL OF LESIONS SMALL N/A 1/10/2025    Procedure: (TURBT);  Surgeon: Memo Falcon MD;  Location: [facility];  Service: Urology       Family History   Problem Relation Age of Onset    Cancer Mother     Cancer Father     Cancer Daughter        Social History     Tobacco Use    Smoking status: Never    Smokeless tobacco: Never   Vaping Use    Vaping status: Never Used   Substance Use Topics    Alcohol use: Yes    Drug use: Never          Current Outpatient Medications:     diazepam (VALIUM) 2 mg tablet, Take 1 tablet (2 mg total) by mouth 1 (one) time for 1 dose Take prior to prostate biopsy, Disp: 1 tablet, Rfl: 0    ibuprofen (MOTRIN) 600 mg tablet, Take 1 tablet (600 mg total) by mouth every 8 (eight) hours as needed for moderate pain, Disp: 30 tablet, Rfl: 0    mupirocin (BACTROBAN) 2 % ointment, Apply twice a day for two weeks (Patient taking differently: Apply topically Apply twice a day for two weeks), Disp: 30 g, Rfl: 0    oxybutynin (DITROPAN-XL) 10 MG 24 hr tablet, Take 1 tablet (10 mg total) by mouth daily at bedtime Take as needed for bladder  spasms from [place], Disp: 5 tablet, Rfl: 0    phenazopyridine (PYRIDIUM) 200 mg tablet, Take 1 tablet (200 mg total) by mouth 3 (three) times a day with meals, Disp: 10 tablet, Rfl: 0    triamcinolone (KENALOG) 0.1 % ointment, Apply topically 2 (two) times a day Apply to affected area twice daily for 2 weeks and then taper to 3 times a week as needed, Disp: 80 g, Rfl: 2    No Known Allergies     There were no vitals filed for this visit.

## 2025-01-27 ENCOUNTER — DOCUMENTATION (OUTPATIENT)
Age: 75
End: 2025-01-27

## 2025-01-27 ENCOUNTER — CONSULT (OUTPATIENT)
Dept: HEMATOLOGY ONCOLOGY | Facility: CLINIC | Age: 75
End: 2025-01-27
Payer: COMMERCIAL

## 2025-01-27 VITALS
OXYGEN SATURATION: 96 % | HEIGHT: 77 IN | WEIGHT: 257 LBS | SYSTOLIC BLOOD PRESSURE: 130 MMHG | RESPIRATION RATE: 18 BRPM | TEMPERATURE: 97.2 F | DIASTOLIC BLOOD PRESSURE: 76 MMHG | HEART RATE: 77 BPM | BODY MASS INDEX: 30.34 KG/M2

## 2025-01-27 DIAGNOSIS — C61 PROSTATE CANCER (HCC): ICD-10-CM

## 2025-01-27 DIAGNOSIS — Z79.899 HIGH RISK MEDICATION USE: ICD-10-CM

## 2025-01-27 DIAGNOSIS — C61 PRIMARY PROSTATE ADENOCARCINOMA (HCC): Primary | ICD-10-CM

## 2025-01-27 DIAGNOSIS — C67.9 UROTHELIAL CARCINOMA OF BLADDER WITHOUT INVASION OF MUSCLE (HCC): ICD-10-CM

## 2025-01-27 DIAGNOSIS — D69.6 THROMBOCYTOPENIA (HCC): ICD-10-CM

## 2025-01-27 DIAGNOSIS — T38.6X5A ADVERSE EFFECT OF ANTIANDROGEN: ICD-10-CM

## 2025-01-27 PROCEDURE — G2211 COMPLEX E/M VISIT ADD ON: HCPCS | Performed by: INTERNAL MEDICINE

## 2025-01-27 PROCEDURE — 99205 OFFICE O/P NEW HI 60 MIN: CPT | Performed by: INTERNAL MEDICINE

## 2025-01-27 RX ORDER — RELUGOLIX 120 MG/1
2 TABLET, FILM COATED ORAL DAILY
Qty: 60 TABLET | Refills: 5 | Status: SHIPPED | OUTPATIENT
Start: 2025-01-27

## 2025-01-27 RX ORDER — RELUGOLIX 120 MG/1
360 TABLET, FILM COATED ORAL DAILY
Qty: 61 TABLET | Refills: 0 | Status: SHIPPED | OUTPATIENT
Start: 2025-01-27

## 2025-01-27 NOTE — PROGRESS NOTES
Received request from clinical for patient to start on Erleada 240 mg and Orgovyx 360mg. Both of these medications have been approved    December 28, 2024 to December 31, 2099

## 2025-01-27 NOTE — PROGRESS NOTES
Guardant form reviewed and signed by Dr. Llamas. Kit given to patient. Reviewed Erleada and Orgovyx informational handouts. Reviewed possible side effects.    Reviewed oral chemotherapy process.    Office handout reivewed with office phone number.    Email sent to oral chemotherapy team for Orgovyx 360mg day 1, 240mg daily there after.  Erleada 240 mg daily.

## 2025-01-28 ENCOUNTER — TELEPHONE (OUTPATIENT)
Dept: RADIATION ONCOLOGY | Facility: CLINIC | Age: 75
End: 2025-01-28

## 2025-01-28 ENCOUNTER — APPOINTMENT (OUTPATIENT)
Dept: LAB | Age: 75
End: 2025-01-28
Payer: COMMERCIAL

## 2025-01-28 DIAGNOSIS — C61 PROSTATE CANCER (HCC): ICD-10-CM

## 2025-01-28 DIAGNOSIS — C61 PRIMARY PROSTATE ADENOCARCINOMA (HCC): ICD-10-CM

## 2025-01-28 LAB
25(OH)D3 SERPL-MCNC: 33.4 NG/ML (ref 30–100)
ALBUMIN SERPL BCG-MCNC: 4.2 G/DL (ref 3.5–5)
ALP SERPL-CCNC: 104 U/L (ref 34–104)
ALT SERPL W P-5'-P-CCNC: 17 U/L (ref 7–52)
ANION GAP SERPL CALCULATED.3IONS-SCNC: 13 MMOL/L (ref 4–13)
AST SERPL W P-5'-P-CCNC: 22 U/L (ref 13–39)
BASOPHILS # BLD AUTO: 0.03 THOUSANDS/ΜL (ref 0–0.1)
BASOPHILS NFR BLD AUTO: 1 % (ref 0–1)
BILIRUB SERPL-MCNC: 1.3 MG/DL (ref 0.2–1)
BUN SERPL-MCNC: 16 MG/DL (ref 5–25)
CALCIUM SERPL-MCNC: 9.4 MG/DL (ref 8.4–10.2)
CHLORIDE SERPL-SCNC: 103 MMOL/L (ref 96–108)
CHOLEST SERPL-MCNC: 180 MG/DL (ref ?–200)
CO2 SERPL-SCNC: 21 MMOL/L (ref 21–32)
CREAT SERPL-MCNC: 0.94 MG/DL (ref 0.6–1.3)
EOSINOPHIL # BLD AUTO: 0.06 THOUSAND/ΜL (ref 0–0.61)
EOSINOPHIL NFR BLD AUTO: 1 % (ref 0–6)
ERYTHROCYTE [DISTWIDTH] IN BLOOD BY AUTOMATED COUNT: 14 % (ref 11.6–15.1)
GFR SERPL CREATININE-BSD FRML MDRD: 77 ML/MIN/1.73SQ M
GLUCOSE P FAST SERPL-MCNC: 65 MG/DL (ref 65–99)
HCT VFR BLD AUTO: 42 % (ref 36.5–49.3)
HDLC SERPL-MCNC: 39 MG/DL
HGB BLD-MCNC: 14.1 G/DL (ref 12–17)
IMM GRANULOCYTES # BLD AUTO: 0.03 THOUSAND/UL (ref 0–0.2)
IMM GRANULOCYTES NFR BLD AUTO: 1 % (ref 0–2)
LDLC SERPL CALC-MCNC: 127 MG/DL (ref 0–100)
LYMPHOCYTES # BLD AUTO: 1.21 THOUSANDS/ΜL (ref 0.6–4.47)
LYMPHOCYTES NFR BLD AUTO: 20 % (ref 14–44)
MCH RBC QN AUTO: 31.2 PG (ref 26.8–34.3)
MCHC RBC AUTO-ENTMCNC: 33.6 G/DL (ref 31.4–37.4)
MCV RBC AUTO: 93 FL (ref 82–98)
MONOCYTES # BLD AUTO: 0.54 THOUSAND/ΜL (ref 0.17–1.22)
MONOCYTES NFR BLD AUTO: 9 % (ref 4–12)
NEUTROPHILS # BLD AUTO: 4.28 THOUSANDS/ΜL (ref 1.85–7.62)
NEUTS SEG NFR BLD AUTO: 68 % (ref 43–75)
NONHDLC SERPL-MCNC: 141 MG/DL
NRBC BLD AUTO-RTO: 0 /100 WBCS
PLATELET # BLD AUTO: 245 THOUSANDS/UL (ref 149–390)
PMV BLD AUTO: 10.7 FL (ref 8.9–12.7)
POTASSIUM SERPL-SCNC: 4.4 MMOL/L (ref 3.5–5.3)
PROT SERPL-MCNC: 7.4 G/DL (ref 6.4–8.4)
PSA SERPL-MCNC: 78.58 NG/ML (ref 0–4)
RBC # BLD AUTO: 4.52 MILLION/UL (ref 3.88–5.62)
SODIUM SERPL-SCNC: 137 MMOL/L (ref 135–147)
TRIGL SERPL-MCNC: 72 MG/DL (ref ?–150)
WBC # BLD AUTO: 6.15 THOUSAND/UL (ref 4.31–10.16)

## 2025-01-28 PROCEDURE — 85025 COMPLETE CBC W/AUTO DIFF WBC: CPT

## 2025-01-28 PROCEDURE — 82306 VITAMIN D 25 HYDROXY: CPT

## 2025-01-28 PROCEDURE — 80061 LIPID PANEL: CPT

## 2025-01-28 PROCEDURE — 80053 COMPREHEN METABOLIC PANEL: CPT

## 2025-01-28 PROCEDURE — 84153 ASSAY OF PSA TOTAL: CPT

## 2025-01-28 PROCEDURE — 36415 COLL VENOUS BLD VENIPUNCTURE: CPT

## 2025-01-28 NOTE — TELEPHONE ENCOUNTER
SpaceOAR Needed: Yes  Fiducial Markers Needed: Yes  ADT Needed: Yes Long term  Treatment Location: [place]

## 2025-01-29 ENCOUNTER — TELEPHONE (OUTPATIENT)
Dept: UROLOGY | Facility: MEDICAL CENTER | Age: 75
End: 2025-01-29

## 2025-01-29 ENCOUNTER — OFFICE VISIT (OUTPATIENT)
Dept: UROLOGY | Facility: MEDICAL CENTER | Age: 75
End: 2025-01-29
Payer: COMMERCIAL

## 2025-01-29 ENCOUNTER — DOCUMENTATION (OUTPATIENT)
Dept: RADIATION ONCOLOGY | Facility: CLINIC | Age: 75
End: 2025-01-29

## 2025-01-29 VITALS
HEART RATE: 71 BPM | OXYGEN SATURATION: 98 % | DIASTOLIC BLOOD PRESSURE: 80 MMHG | HEIGHT: 77 IN | BODY MASS INDEX: 30.34 KG/M2 | SYSTOLIC BLOOD PRESSURE: 140 MMHG | WEIGHT: 257 LBS

## 2025-01-29 DIAGNOSIS — C61 PROSTATE CANCER (HCC): ICD-10-CM

## 2025-01-29 DIAGNOSIS — C67.8 MALIGNANT NEOPLASM OF OVERLAPPING SITES OF BLADDER (HCC): Primary | ICD-10-CM

## 2025-01-29 PROCEDURE — 99214 OFFICE O/P EST MOD 30 MIN: CPT | Performed by: UROLOGY

## 2025-01-29 NOTE — PROGRESS NOTES
Called patient to discuss plan of care. Patient stated that he will be starting hormone therapy soon. His wishes are to start hormone therapy and see how his PSA responds, and perhaps see how his disease responds with another scan in the future before proceeding with radiation therapy. This is a reasonable approach. No radiotherapy or follow up with me is planned at this time, I am happy to speak with the patient at any time if needed.    Lisette Jones MD 01/29/25 2:57 PM

## 2025-01-29 NOTE — PROGRESS NOTES
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 (cannot exclude focal superficial T1 disease) with negative muscle in specimen    - discussed intravesical treatment with BCG for high risk NMIBC  - also discussed option of early cystectomy in high risk patients with NMIBC  - strong indicators for early cystectomy in NMIBC include:    - adverse variant histology: micropapillary, adenocarcinoma, small cell, SCC   - + LVI in HGT1 disease   - BCG failure of HGT1 or CIS   - deep prostatic duct involvement   - incompletely resectable, multifocal HGT1   - dysfunctional bladder with recurrent disease  - relative indicators for early cystectomy in NMIBC include:   - HGT1 on restaging TURBT   - recurrence of HGT1 after BCG   - multifocal HGT1 + CIS at primary presentation   - HGT1 in bladder diverticulum   - recurrent prostatic involvement    - up to 40% of high risk cases treated with BCG will relapse and 20% will progress to muscle-invasive disease within 5 years  - both 5 and 10-year disease specific survival  is improved in patients undergoing early cystectomy as compared to patients who failed intravesical BCG therapy     Patient agrees to proceed with induction BCG x 6 with plans for OR for cystoscopy, biopsy and possible TURBT 6 weeks after completing BCG given presence of CIS  Consent signed for intravesical BCG     Orders:    Case request operating room: CYSTOSCOPY WITH BIOPSIES, POSSIBLE TURBT; Standing    Prostate cancer (HCC)  Metastatic prostate cancer  Most recent PSA up to 78 on 2025  GG 4, PSA 32, probable ISH on MRI, 1 cm right external iliac lymph node  PSMA PET scan showed taisha disease in lower abdomen and right pelvis, no bone metastasis  -  "on combination ADT with oncology: apalutamide and relugolix   - seen by radiation oncology as well  - patient expressed preference to proceed with ADT alone and not proceed with EBRT, will have him discuss further with oncology              History of Present Illness   Aman Vasquez is a 75 y.o. male who presents for follow up to TURBT on 1950    A. Bladder, Right Lateral Wall, Biopsy:  - High-grade papillary urothelial carcinoma with inverted growth pattern; cannot exclude focal superficial lamina propria invasion.  - Benign muscularis propria present.     B. Bladder, Deep Margin, Biopsy:  - Portions of muscularis propria with chronic inflammation and reactive changes.     C. 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  - Invasion not seen on H&E.  - Benign muscularis propria present.     D. Bladder, Right Dome, Biopsy:  - Rare detached atypical urothelial cells present. See Note.  - Abundant denuded urothelial mucosa with marked reactive changes    Currently voiding without any issues  No hematuria or dysuria    PSMA PET scan showed taisha metastasis for his prostate cancer  Most recent PSA up to 78 on 1/28/2025  Reviewed notes from radiation oncology and hematology oncology regarding treatment plan for patient  Ordered for combination of apalutamide and relugolix      Review of Systems   All other systems reviewed and are negative.         Objective   /80 (BP Location: Left arm, Patient Position: Sitting, Cuff Size: Standard)   Pulse 71   Ht 6' 5\" (1.956 m)   Wt 117 kg (257 lb)   SpO2 98%   BMI 30.48 kg/m²     Physical Exam  Vitals and nursing note reviewed.   Constitutional:       General: He is not in acute distress.     Appearance: He is well-developed.   HENT:      Head: Normocephalic and atraumatic.   Eyes:      Conjunctiva/sclera: Conjunctivae normal.   Cardiovascular:      Rate and Rhythm: Normal rate and regular rhythm.      Heart sounds: No murmur " heard.  Pulmonary:      Effort: Pulmonary effort is normal. No respiratory distress.      Breath sounds: Normal breath sounds.   Abdominal:      Palpations: Abdomen is soft.      Tenderness: There is no abdominal tenderness.   Musculoskeletal:         General: No swelling.      Cervical back: Neck supple.   Skin:     General: Skin is warm and dry.      Capillary Refill: Capillary refill takes less than 2 seconds.   Neurological:      Mental Status: He is alert.   Psychiatric:         Mood and Affect: Mood normal.          Results  Lab Results   Component Value Date    PSA 78.583 (H) 01/28/2025    PSA 32.064 (H) 12/02/2024    PSA 22.541 (H) 07/31/2024     Lab Results   Component Value Date    CALCIUM 9.4 01/28/2025    K 4.4 01/28/2025    CO2 21 01/28/2025     01/28/2025    BUN 16 01/28/2025    CREATININE 0.94 01/28/2025     Lab Results   Component Value Date    WBC 6.15 01/28/2025    HGB 14.1 01/28/2025    HCT 42.0 01/28/2025    MCV 93 01/28/2025     01/28/2025       Office Urine Dip  No results found for this or any previous visit (from the past hour).]

## 2025-01-29 NOTE — TELEPHONE ENCOUNTER
Patient to proceed with intravesical induction BCG x 6 follows by cystoscopy, bladder biopsy possible TURBT in OR 6 weeks after completing BCG.  Consent signed for BCG

## 2025-01-30 ENCOUNTER — DOCUMENTATION (OUTPATIENT)
Dept: HEMATOLOGY ONCOLOGY | Facility: CLINIC | Age: 75
End: 2025-01-30

## 2025-01-30 LAB — BACTERIA UR CULT: NORMAL

## 2025-01-30 NOTE — PROGRESS NOTES
Per urology and RAD ONC notes, patient will not be proceeding with RT at this time. He will just get ADT and will possibly proceed with RT in the future not oncology navigation needed moving forward.

## 2025-02-04 NOTE — TELEPHONE ENCOUNTER
Patient of Dr Falcon in the [place] office. He had bladder mass noted on imaging 12/2024. TURBT done 1/10/25, pathology showing CIS and HGTa. It was recommended patient receive induction BCG if available.     According to AUA risk stratification for non-muscle invasive bladder cancer, this patient has been classified as high risk.  Therefore, in keeping with AUA guidelines regarding current worldwide BCG shortage, it has been recommended that this patient receive induction BCG x6 doses.  Dosing is available, patient can be scheduled after 2/14/25, 5 weeks post op. He will then need cystoscopy, bladder biopsy possible TURBT in OR 6 weeks after completing BCG.

## 2025-02-04 NOTE — PROGRESS NOTES
Name: Aman Vasquez      : 1950      MRN: [id number]  Encounter Provider: Minoo Chong MD  Encounter Date: 2025   Encounter department: [facility] PALLIATIVE CARE [place]  :  Assessment & Plan  Primary prostate adenocarcinoma (HCC)  He is on apalutamide (Erleada) and relugolix (Orgovyx), started yesterday and tolerating well thus far  Denies any cancer-related symptoms  Identified no other palliative medicine needs at this time  Orders:    Ambulatory referral to Palliative Care    Palliative care encounter  He lives with his wife. This is his 2nd marriage. He has a son from a previous marriage.   He has 3 siblings (2 brothers and a sister) who live in [place] but are 1.5 hours away at least.   He is the main caregiver for his wife who appears to have advanced dementia.   He did note difficulty sometimes with his own doctor's appts because she cannot be left alone, so he either brings her with him or he finds someone to stay with her. He has a neighbor who had been doing this for him but that neighbor just got a job so it is not as easy to get someone to be with her if needed.   Provided resources for him - [facility] of [facility] and [facility] Eating habits booklet  PSC  provided a list of other resources for him centered on home health aides for his wife.       Advanced care planning/counseling discussion  He has no medical POA. He said he has a will, but unclear if he has medical advanced directives.  [place] Act 169 explained in terms of hierarchy in surrogate medical decision making.   His NOK would've been his wife and his son together but given wife's dementia it will be his son, which he has no problems with.   ACP document explained in detail and a copy was provided for him.   I spent at least 15 mins discussing ACP         Follow up  RTO prn per patient's preference given no identifiable palliative medicine needs at this time and caregiving duties to wife with dementia    Decisional apparatus: Patient is competent  on my exam today. If competence is lost, patient's substitute decision maker would default to son by [place] Act 169.     PDMP Review: I have reviewed the patient's controlled substance dispensing history in the Prescription Drug Monitoring Program in compliance with the [facility] regulations before prescribing any controlled substances.    12/03/2024 12/03/2024 1 Diazepam 2 Mg Tablet 1.00 1 Mi Mar [id number] Pen ([id number]) 0 0.20 LME Medicaid [place]       History of Present Illness   Aman Vasquez is a 75 y.o. 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 He is referred to palliative medicine for supportive cares.     Met him and his wife. Introduced palliative medicine and explained role in care. Of note, wife has pretty advanced dementia and was largely nonverbal in the office.     He currently denies any cancer-related symptoms - no pain, appetite and oral intake are both good, energy level is at baseline. He is tolerating so far his oral chemo which he only started yesterday. He seems to be coping well with his diagnosis. Nonetheless, I spent time explaining overall management of cancer-related pain, if this ever becomes an issue for him in the future. Cancer-directed therapies, ie chemotherapy, radiation therapy, immunotherapy, will make the most significant and long term improvement in cancer pain. Role of opioid and other adjuncts will be for lowering pain threshold to a more tolerable level so he can continue to remain functional despite continued presence of pain. This is not meant for complete resolution of pain. He is not very keen on starting opioids, which is understandable but he seems open to this in the future at least. Discussed low and slow approach if ever. He is opioid naive. There is no indication to begin opioid  therapy at this time.     He lives with his wife. This is his 2nd marriage. He has a son from a previous marriage. He has 3 siblings (2 brothers and a sister) who live in [place] but are 1.5 hours away at least. He is the main caregiver for his wife who appears to have advanced dementia. He did note difficulty sometimes with his own doctor's appts because she cannot be left alone, so he either brings her with him or he finds someone to stay with her. He has a neighbor who had been doing this for him but that neighbor just got a job so it is not as easy to get someone to be with her if needed. [facility] SW provided a list of other resources for him.    He has no medical POA. He said he has a will, but unclear if he has medical advanced directives. [place] Act 169 explained in terms of hierarchy in surrogate medical decision making. His NOK would've been his wife and his son together but given wife's dementia it will be his son, which he has no problems with. [place] ACP document explained in detail and a copy was provided for him.          Objective   There were no vitals taken for this visit.    Physical Exam  Constitutional:       General: He is not in acute distress.     Appearance: He is not ill-appearing, toxic-appearing or diaphoretic.      Comments: Comfortable, pleasant, in good spirits   HENT:      Head: Normocephalic and atraumatic.   Eyes:      General: No scleral icterus.        Right eye: No discharge.         Left eye: No discharge.   Pulmonary:      Effort: Pulmonary effort is normal. No respiratory distress.   Abdominal:      General: Abdomen is flat. There is no distension.   Musculoskeletal:         General: No swelling.   Skin:     General: Skin is dry.      Coloration: Skin is not jaundiced or pale.   Neurological:      General: No focal deficit present.      Mental Status: He is alert and oriented to person, place, and time.   Psychiatric:         Mood and Affect: Mood normal.         Behavior: Behavior normal.          Thought Content: Thought content normal.         Judgment: Judgment normal.         Recent labs:  Lab Results   Component Value Date/Time    SODIUM 137 01/28/2025 09:54 AM    K 4.4 01/28/2025 09:54 AM    BUN 16 01/28/2025 09:54 AM    CREATININE 0.94 01/28/2025 09:54 AM    CALCIUM 9.4 01/28/2025 09:54 AM    AST 22 01/28/2025 09:54 AM    ALT 17 01/28/2025 09:54 AM    ALB 4.2 01/28/2025 09:54 AM    TP 7.4 01/28/2025 09:54 AM    EGFR 77 01/28/2025 09:54 AM     Lab Results   Component Value Date/Time    HGB 14.1 01/28/2025 09:54 AM    WBC 6.15 01/28/2025 09:54 AM     01/28/2025 09:54 AM     Lab Results   Component Value Date/Time    [id number] 0.613 05/10/2024 11:24 AM       Recent Imaging:  Procedure: NM PET CT skull base to mid thigh  Result Date: 12/31/2024  Narrative: ILLUCIX PSMA PET/CT SCAN INDICATION:  C61: Malignant neoplasm of prostate Initial staging of prostate cancer. PSA 33.064 MODIFIER: PI COMPARISON: Prostate MRI 11/26/2024 CELL TYPE:  prostatic adenocarcinoma, Christine 8 TECHNIQUE:   4.8 mCi F-18 Pylarify PSMA administered IV. Multiplanar attenuation corrected and non attenuation corrected PET images were acquired 60 minutes post injection. Contiguous, low dose, axial CT sections were obtained from the vertex through the  femurs .   Intravenous or oral contrast was not utilized.  This examination, like all CT scans performed in the [facility], was performed utilizing techniques to minimize radiation dose exposure, including the use of iterative reconstruction and automated exposure control. FINDINGS: All images refer to series 4 unless otherwise specified. VISUALIZED BRAIN: No acute abnormalities are seen. HEAD/NECK: There is a physiologic distribution of the radiotracer. No PSMA avid cervical adenopathy is seen. CT images: Unremarkable. CHEST: No PSMA avid soft tissue lesions are seen. CT images: Coronary artery calcification. Probable tiny granuloma in the anterior left upper  lobe image 85. ABDOMEN: -There is a small soft tissue nodule located between the IVC and the adjacent psoas muscle on image 174 measuring 0.5 cm with SUV max 5.9. This is medial to the right ureter. - There is a soft tissue nodule along the lateral margin of the right common iliac vessels image 193 measuring 0.7 cm, SUV max 9.4. - More subtle soft tissue nodule medial to the iliac vessels on image 201 measuring 0.6 cm SUV max 10. This is likely immediately adjacent to the ureter. - Soft tissue nodule along the posterior margin of the right iliac vessels on image 207 measures 1.1 x 1.0 cm, SUV max 17. CT images: Bilateral nephrolithiasis. Large transverse duodenal diverticulum. PELVIS: Prostate: Prostatomegaly. Bilateral foci of increased PSMA activity, the largest is present within the right posterolateral peripheral zone, size based on area of activity approximately 1.9 x 1.2 cm, SUV max 19. Smaller focus noted in the left lateral peripheral zone image 230, measures approximately 0.8 cm with SUV max 12.0. Third area of activity is seen within the left gland just lateral to the midline image 230 with SUV max 14 although to some extent this could represent urethral activity as well. Seminal vesicles : No evidence of PSMA avid involvement. Lymph nodes: Please refer above to abdominal section. No additional PSMA avid lymph nodes further inferiorly Other:  No additional PSMA avid lesions are seen. CT images: Adipose containing left inguinal hernia. Adipose containing ventral hernia. Scattered colonic diverticula. OSSEOUS STRUCTURES: No PSMA avid lesions are seen. CT images: No significant findings.     Impression: 1. Prostatomegaly with multifocal areas of increased PSMA activity particularly in the right posterolateral peripheral zone in keeping with recent biopsy-proven prostate cancer 2. Radiotracer avid taisha metastases in the lower abdomen and right pelvis 3. No evidence of seminal vesicle involvement or PSMA avid  osseous metastasis 4. Bilateral nephrolithiasis Workstation performed: [id number]     Procedure: Colonoscopy  Result Date: 12/18/2024  Narrative: Table formatting from the original result was not included. [facility] [address] [place] [place] [id number] [phone] DATE OF SERVICE: 12/18/24 PHYSICIAN(S): Attending: Payam Valverde MD Fellow: No Staff Documented INDICATION: History of colon polyps POST-OP DIAGNOSIS: See the impression below. HISTORY: Prior colonoscopy: 3 years ago. BOWEL PREPARATION: Miralax/Dulcolax PREPROCEDURE: Informed consent was obtained for the procedure, including sedation. Risks including but not limited to bleeding, infection, perforation, adverse drug reaction and aspiration were explained in detail. Also explained about less than 100% sensitivity with the exam and other alternatives. The patient was placed in the left lateral decubitus position. Procedure: Colonoscopy DETAILS OF PROCEDURE: Patient was taken to the procedure room where a time out was performed to confirm correct patient and correct procedure. The patient underwent monitored anesthesia care, which was administered by an anesthesia professional. The patient's blood pressure, ECG, ETCO2, heart rate, level of consciousness, oxygen and respirations were monitored throughout the procedure. A digital rectal exam was performed. The scope was introduced through the anus and advanced to the cecum. Retroflexion was performed in the rectum. The quality of bowel preparation was evaluated using the [place] Bowel Preparation Scale with scores of: right colon = 2, transverse colon = 2, left colon = 2. The total BBPS score was 6. Bowel prep was adequate. The patient experienced no blood loss. The procedure was not difficult. The patient tolerated the procedure well. There were no apparent adverse events. ANESTHESIA INFORMATION: ASA: II Anesthesia Type: Anesthesia type not filed in the log. MEDICATIONS:  sodium chloride 0.9 % infusion 700 mL*  *From user-documented volume simethicone (MYLICON) 40 mg in sterile water 50 mL 40 mg (Totals for administrations occurring from 0853 to 0920 on 12/18/24) FINDINGS: Mild, localized erythematous mucosa in the cecum and hepatic flexure. A small patch of erythema was seen in the cecum and hepatic flexure. One 2 mm sessile polyp in the distal rectum; performed cold forceps biopsy with complete en bloc removal Internal large hemorrhoids EVENTS: Procedure Events Event Event Time ENDO CECUM REACHED 12/18/2024  9:04 AM ENDO SCOPE OUT TIME 12/18/2024  9:18 AM SPECIMENS: ID Type Source Tests Collected by Time Destination 1 : Rectal polyp-cold bx Tissue Polyp, Colorectal TISSUE EXAM Payam Valverde MD 12/18/2024  9:17 AM  EQUIPMENT: Colonoscope -[id number]     Impression: Mild erythematous mucosa in the cecum and hepatic flexure Subcentimeter polyp in the distal rectum was removed with cold forceps biopsy Large hemorrhoids RECOMMENDATION: No further screening colonoscopies necessary Age greater than 65    Payam Valverde MD     Procedure: MRI prostate multiparametric wo w contrast  Result Date: 12/1/2024  Narrative: MULTIPARAMETRIC MRI OF THE PROSTATE WITH AND WITHOUT CONTRAST-WITH 3-D POSTPROCESSING INDICATION: R97.20: Elevated prostate specific antigen (PSA). PSA LEVEL: 22.5 ng/mL on July 31, 2024. PRIOR BIOPSY DATE: No prior biopsy. BIOPSY RESULTS: Not applicable. COMPARISON: None. TECHNIQUE: Multiparametric MRI of the prostate was performed with and without contrast CONTRAST: Gadobutrol (Gadavist) 12 mL of Gadobutrol injection (SINGLE-DOSE) TECHNICAL LIMITATIONS: None. FINDINGS: PROSTATE: Size: 5.6 x 3.8 x 4.3 cm = 51.7 mL. PSAD= 0.44 ng/mL2 Hemorrhage: None. Benign prostatic hyperplasia (BPH): Mild. Focal lesions as follows: Lesion: 1 Size: 3.9 x 5.2 cm, series 450, image 15. Location: Anterior and posterior left peripheral zone from base to apex, posterior right peripheral zone  at mid gland T2-weighted images: Score 5: Lenticular or noncircumscribed, homogeneous, moderately hypointense but greater than or equal to 1.5 cm in greatest dimension or definite extraprostatic extension/invasive behavior. Diffusion-weighted images: Score 5: Focal markedly hypointense on ADC and markedly hyperintense on high b-value DWI, but greater than or equal to 1.5 cm in greatest dimension or definite extraprostatic extension/invasive behavior. Dynamic post-contrast images: (+) Focal, earlier or contemporaneous with, enhancement of adjacent normal prostatic tissues; corresponds to a finding on T2-weighted and/or DWI. PI-RADS Assessment Category: 5, Very high (clinically significant cancer is highly likely to be present). Extra-prostatic extension (EPE): Probable extra prostatic extension anterolaterally on the left side at base. Note: Clinically significant cancer is defined on pathology/histology as Christine score greater than or equal to 7, and/or volume of greater than or equal to 0.5 mL, and/or extraprostatic extension. SEMINAL VESICLES : Unremarkable. URINARY BLADDER: 1.2 x 1.0 cm enhancing right-sided polypoid soft tissue within the bladder has restricted diffusion (series 5, image 11; series 7, image 712; series 400, image 7). LYMPH NODES: 1.0 x 1.0 cm right external iliac lymph node (series 6, image 35 BONES: No suspicious osseous lesion.     Impression: 1. PI-RADSv2.1 Category 5 - Very high (clinically significant cancer is highly likely to be present). 5.2 cm lesion involving the left peripheral zone from base to apex and right peripheral zone at mid gland with probable extra prostatic extension. Enlarged right external iliac lymph node. No seminal vesicle invasion or pelvic osseous metastatic disease. 2. Right-sided bladder lesion is concerning for urothelial carcinoma. Cystoscopy is advised. 3. Calculated prostate volume of 51 mL. The study was marked in EPIC for significant notification. Prostate  gland boundaries and areas of concern for significant prostate cancer were segmented using 3D advanced post-processing on an independent [employer] system workstation with active physician participation. The segmentation was performed should MR-ultrasound fusion biopsy be required. Workstation performed: [id number]     Procedure: XR chest pa & lateral  Result Date: 10/16/2024  Narrative: CHEST INDICATION:   Chronic cough. Contact with and (suspected) exposure to asbestos. COMPARISON:  None. EXAM PERFORMED/VIEWS:  XR CHEST PA AND LATERAL FINDINGS: Cardiomediastinal silhouette appears unremarkable. The lungs are clear.  No pneumothorax or pleural effusion. Osseous structures appear within normal limits for patient age.     Impression: No acute cardiopulmonary disease. No evidence of pleural plaque or interstitial fibrosis to suggest asbestos related disease Electronically signed: 10/16/2024 05:43 PM Nabil Akers MD    Procedure: XR spine lumbar minimum 4 views non injury  Result Date: 10/16/2024  Narrative: LUMBAR SPINE INDICATION:   Low back pain, unspecified. Other chronic pain. COMPARISON:  None. VIEWS:  XR SPINE LUMBAR MINIMUM 4 VIEWS NON INJURY Images: 5 FINDINGS: There are 5 non rib bearing lumbar vertebral bodies. There is no evidence of acute fracture or destructive osseous lesion. Grade I spondylolithesis L4 on L5. Advanced disc height loss L5-S1 with multilevel vertebral body endplate spurring and advanced lower lumbar facet arthropathy. The pedicles appear intact. Soft tissues are unremarkable.     Impression: No acute osseous abnormality.  Degenerative changes as described. Electronically signed: 10/16/2024 01:19 PM Michael Dean, MPH,MD      Administrative Statements   I have spent a total time of 45 minutes in caring for this patient on the day of the visit/encounter including Diagnostic results, Prognosis, Risks and benefits of tx options, Instructions for management, Patient and family education,  Importance of tx compliance, Risk factor reductions, Impressions, Counseling / Coordination of care, Documenting in the medical record, Reviewing / ordering tests, medicine, procedures  , and Obtaining or reviewing history  . Topics discussed with the patient / family include symptom assessment and management, medication review, psychosocial support, advanced directives, goals of care, opioid titration, supportive listening, and anticipatory guidance.    Minoo Chong MD  Palliative Medicine & Supportive Care  Internal Medicine  Available via [place] [employer]  Office: [phone]  Fax: [phone]

## 2025-02-05 ENCOUNTER — SOCIAL WORK (OUTPATIENT)
Dept: PALLIATIVE MEDICINE | Facility: CLINIC | Age: 75
End: 2025-02-05

## 2025-02-05 ENCOUNTER — TELEPHONE (OUTPATIENT)
Dept: HEMATOLOGY ONCOLOGY | Facility: CLINIC | Age: 75
End: 2025-02-05

## 2025-02-05 ENCOUNTER — CONSULT (OUTPATIENT)
Dept: PALLIATIVE MEDICINE | Facility: CLINIC | Age: 75
End: 2025-02-05
Payer: COMMERCIAL

## 2025-02-05 ENCOUNTER — RESULTS FOLLOW-UP (OUTPATIENT)
Dept: GASTROENTEROLOGY | Facility: MEDICAL CENTER | Age: 75
End: 2025-02-05

## 2025-02-05 VITALS
OXYGEN SATURATION: 98 % | RESPIRATION RATE: 18 BRPM | DIASTOLIC BLOOD PRESSURE: 70 MMHG | HEART RATE: 62 BPM | WEIGHT: 250 LBS | BODY MASS INDEX: 29.65 KG/M2 | SYSTOLIC BLOOD PRESSURE: 130 MMHG

## 2025-02-05 DIAGNOSIS — Z51.5 PALLIATIVE CARE ENCOUNTER: Primary | ICD-10-CM

## 2025-02-05 DIAGNOSIS — Z71.89 COUNSELING AND COORDINATION OF CARE: Primary | ICD-10-CM

## 2025-02-05 DIAGNOSIS — C61 PRIMARY PROSTATE ADENOCARCINOMA (HCC): ICD-10-CM

## 2025-02-05 DIAGNOSIS — Z71.89 ADVANCED CARE PLANNING/COUNSELING DISCUSSION: ICD-10-CM

## 2025-02-05 PROCEDURE — 99205 OFFICE O/P NEW HI 60 MIN: CPT | Performed by: INTERNAL MEDICINE

## 2025-02-05 PROCEDURE — 99497 ADVNCD CARE PLAN 30 MIN: CPT | Performed by: INTERNAL MEDICINE

## 2025-02-05 PROCEDURE — NC001 PR NO CHARGE

## 2025-02-05 NOTE — TELEPHONE ENCOUNTER
Guardant results obtained and uploaded into patient chart for provider review.  Dr. Llamas notified.    Dr. Llamas appointment: 3/25/25

## 2025-02-05 NOTE — PROGRESS NOTES
Palliative Outpatient Assessment of Need    SW completed an assessment of need which was completed with patient and spouse in the office.    Relationship status:    Duration of relationship: 26 Years  Name of significant other: Dena Vasquez “Wife has advanced dementia.”  Children and Ages Pt has 1 son from previous marriage. No children with current marriage.  Pets: None    Other important family information: Pt has 3 siblings 2 brothers and a sister who all lives in [place].  Living situation (where and whom): Pt lives in the home with spouse.  Patient's primary caregiver: Self   Any limitations of caregiver: Pt primary caretaker to spouse who has dementia.   Environmental concerns or barriers:   history: Pt reports serving in the [employer] and receiving benefits from [employer].  Employment history/source of income: Retired   Disability:    Concerns regarding literacy: None  Spirituality/ Christianity: Moravian   Patient's strengths, social supports, and resources: Supportive Family   Cultural information:   Mental Health current or previous: Pt reports mental health is currently stable.  Substance use or history: Pt reports no history of using tabaco products or other substance usage.   Sleep: Pt reports no concerns with sleeping   Exercise: pt reports no engagement with physical activity. However, pt would love to get back to it.  Diet/nutrition: Pt reports no concerns with appetite.  Bowel Movement: No concerns with BM'S  Durable Medical Equipment needs: Pt utilizes a cane as PRN.  Transportation: No Transportation Concerns  Financial concerns: No Financial Concerns  Advanced Directive: Pt has no medical POA. Pt does have a living will, but uncertain if he has medical advanced directives.   Other medical or social work providers involved: Hem Onc, Urology, Radiology, GI Fam Med.  Patient/caregiver current level of coping: Pt reports current level of coping is stable. Pt enjoys watching Tv, spending time with  family, and outdoor activities.  Understanding: Pt appears understanding of current medical status  Patient/family concerns and areas of need: Pt denies any cancer related pain  Patient's interests: Pt would like to continue following up with [facility] PRN for GOC and additional support. See plan under palliative care.      I have spent 60 minutes with patient and spouse today in which greater than 50% of this time was spent in counseling/coordination of care.    *All questions may not be answered due to constraints.  Follow-up discussions may need to occur.

## 2025-02-05 NOTE — ASSESSMENT & PLAN NOTE
He has no medical POA. He said he has a will, but unclear if he has medical advanced directives.  [place] Act 169 explained in terms of hierarchy in surrogate medical decision making.   His NOK would've been his wife and his son together but given wife's dementia it will be his son, which he has no problems with.  [place] ACP document explained in detail and a copy was provided for him.   I spent at least 15 mins discussing ACP

## 2025-02-05 NOTE — TELEPHONE ENCOUNTER
Call placed to pt and spoke with him about BCG treatments and scheduling.   Pt was driving at the time of call. He states he needs to check his schedule and find a sitter for his wife who has Dementia.   Once he figures out these arrangements, he will contact the office to schedule his treatments.     Office number was provided to the patient to call back and schedule when able.

## 2025-02-05 NOTE — ASSESSMENT & PLAN NOTE
He lives with his wife. This is his 2nd marriage. He has a son from a previous marriage.   He has 3 siblings (2 brothers and a sister) who live in [place] but are 1.5 hours away at least.   He is the main caregiver for his wife who appears to have advanced dementia.   He did note difficulty sometimes with his own doctor's appts because she cannot be left alone, so he either brings her with him or he finds someone to stay with her. He has a neighbor who had been doing this for him but that neighbor just got a job so it is not as easy to get someone to be with her if needed.   Provided resources for him - [facility] of [facility] and [facility] Eating habits booklet  PSC SW provided a list of other resources for him centered on home health aides for his wife.

## 2025-02-05 NOTE — ASSESSMENT & PLAN NOTE
He is on apalutamide (Erleada) and relugolix (Orgovyx), started yesterday and tolerating well thus far  Denies any cancer-related symptoms  Identified no other palliative medicine needs at this time  Orders:    Ambulatory referral to Palliative Care

## 2025-02-05 NOTE — TELEPHONE ENCOUNTER
Patient called stating he is call the office to schedule his BCG treatments.      Patient can be reached at [phone]

## 2025-02-06 LAB
CARIS ANDROGEN RECEPTOR: POSITIVE
CARIS GENOMIC LOH - EXOME: NORMAL
CARIS HER2/NEU: NORMAL
CARIS HLA-A: NORMAL
CARIS HLA-B: NORMAL
CARIS HLA-C: NORMAL
CARIS MSI - EXOME: NORMAL
CARIS PD-L1 (SP142): NEGATIVE
CARIS TMB - EXOME: NORMAL

## 2025-02-06 NOTE — TELEPHONE ENCOUNTER
Patient called back and said that 2/17 at 130 pm would work for BCG appointment. He would like a call back to confirm the date/time.    CB: [phone]

## 2025-02-06 NOTE — TELEPHONE ENCOUNTER
Call placed to pt. Spoke with him and scheduled BCG treatments starting 2- at 1:30pm.   Pt scheduled for 6 week treatment course respectively.     Aftercare instructions were reviewed with him and he verbalized his understanding.

## 2025-02-12 ENCOUNTER — PREP FOR PROCEDURE (OUTPATIENT)
Dept: UROLOGY | Facility: MEDICAL CENTER | Age: 75
End: 2025-02-12

## 2025-02-12 DIAGNOSIS — Z01.812 PRE-OPERATIVE LABORATORY EXAMINATION: ICD-10-CM

## 2025-02-12 DIAGNOSIS — D49.4 BLADDER TUMOR: ICD-10-CM

## 2025-02-12 DIAGNOSIS — R39.89 SUSPECTED UTI: Primary | ICD-10-CM

## 2025-02-14 ENCOUNTER — TELEPHONE (OUTPATIENT)
Dept: UROLOGY | Facility: MEDICAL CENTER | Age: 75
End: 2025-02-14

## 2025-02-14 DIAGNOSIS — C67.8 MALIGNANT NEOPLASM OF OVERLAPPING SITES OF BLADDER (HCC): Primary | ICD-10-CM

## 2025-02-17 ENCOUNTER — PROCEDURE VISIT (OUTPATIENT)
Dept: UROLOGY | Facility: MEDICAL CENTER | Age: 75
End: 2025-02-17
Payer: COMMERCIAL

## 2025-02-17 VITALS
WEIGHT: 252 LBS | BODY MASS INDEX: 29.76 KG/M2 | HEIGHT: 77 IN | DIASTOLIC BLOOD PRESSURE: 80 MMHG | SYSTOLIC BLOOD PRESSURE: 138 MMHG

## 2025-02-17 DIAGNOSIS — C67.8 MALIGNANT NEOPLASM OF OVERLAPPING SITES OF BLADDER (HCC): Primary | ICD-10-CM

## 2025-02-17 LAB
SL AMB  POCT GLUCOSE, UA: NORMAL
SL AMB LEUKOCYTE ESTERASE,UA: NORMAL
SL AMB POCT BILIRUBIN,UA: NORMAL
SL AMB POCT BLOOD,UA: NORMAL
SL AMB POCT CLARITY,UA: CLEAR
SL AMB POCT COLOR,UA: YELLOW
SL AMB POCT KETONES,UA: NORMAL
SL AMB POCT NITRITE,UA: NORMAL
SL AMB POCT PH,UA: 5.5
SL AMB POCT SPECIFIC GRAVITY,UA: >=1.03
SL AMB POCT URINE PROTEIN: NORMAL
SL AMB POCT UROBILINOGEN: 0.2

## 2025-02-17 PROCEDURE — 51720 TREATMENT OF BLADDER LESION: CPT

## 2025-02-17 PROCEDURE — 81003 URINALYSIS AUTO W/O SCOPE: CPT

## 2025-02-17 NOTE — PROGRESS NOTES
"2/17/2025    Aman Vasquez  1950  [id number]    Diagnosis  Chief Complaint    Bladder Cancer         Patient presents for BCG 1 of 6 managed by Dr. Falcon    Plan  Orders Placed This Encounter   Procedures    POCT urine dip auto non-scope     Return to the office in 1 week for BCG #2 of 6   Patient instructed to call with persistent hematuria, fever, or other concerns.    Procedure BCG treatment 1 of 6  Vitals:    02/17/25 1315   BP: 138/80   BP Location: Left arm   Patient Position: Sitting   Cuff Size: Standard   Weight: 114 kg (252 lb)   Height: 6' 5\" (1.956 m)       Recent Results (from the past 4 hours)   POCT urine dip auto non-scope    Collection Time: 02/17/25  1:37 PM   Result Value Ref Range     COLOR,UA yellow     CLARITY,UA clear     SPECIFIC GRAVITY,UA >=1.030      PH,UA 5.5     LEUKOCYTE ESTERASE,UA trace     NITRITE,UA neg     GLUCOSE, UA neg     KETONES,UA neg     BILIRUBIN,UA neg     BLOOD,UA moderate     POCT URINE PROTEIN neg     SL AMB POCT UROBILINOGEN 0.2                Bladder instillation     Date/Time  2/17/2025 1:35 PM     Performed by  Elicia Vang RN   Authorized by  Abhishek Dunne MD     Universal Protocol   Procedure performed by: (Diane Graff RN)  Consent: Verbal consent obtained.  Risks and benefits: risks, benefits and alternatives were discussed  Consent given by: patient  Patient understanding: patient states understanding of the procedure being performed  Patient identity confirmed: verbally with patient      Local anesthesia used: no     Anesthesia   Local anesthesia used: no     Sedation   Patient sedated: no        Specimen: yes (Urine dip collected per BCG protocol)    Culture: no   Procedure Details   Procedure Notes: BCG instilled into the bladder as ordered. Pt tolerated procedure well. Aftercare instructions were reviewed with the patient. He verbalized his understanding of instructions.     Patient tolerance: patient tolerated the procedure well with no immediate " complications             Sterile technique employed. Patient prepped and identified. 16F coude tipped catheter placed. Bladder drained, residual approximately 75mL. The following solution was instilled directly into the bladder via catheter: 1 Vial BCG. Catheter removed. Patient discharged. Patient tolerated procedure.     Administrations This Visit       BCG (NATIVIDAD BCG) intravesical suspension 50 mg       Admin Date  02/17/2025 Action  Given Dose  50 mg Route  Intravesical Documented By  MELISSA Ruggiero RN

## 2025-02-24 ENCOUNTER — PROCEDURE VISIT (OUTPATIENT)
Dept: UROLOGY | Facility: MEDICAL CENTER | Age: 75
End: 2025-02-24
Payer: COMMERCIAL

## 2025-02-24 VITALS
SYSTOLIC BLOOD PRESSURE: 136 MMHG | WEIGHT: 250 LBS | BODY MASS INDEX: 29.52 KG/M2 | DIASTOLIC BLOOD PRESSURE: 70 MMHG | HEIGHT: 77 IN

## 2025-02-24 DIAGNOSIS — C67.8 MALIGNANT NEOPLASM OF OVERLAPPING SITES OF BLADDER (HCC): Primary | ICD-10-CM

## 2025-02-24 LAB
SL AMB  POCT GLUCOSE, UA: NORMAL
SL AMB LEUKOCYTE ESTERASE,UA: NORMAL
SL AMB POCT BILIRUBIN,UA: NORMAL
SL AMB POCT BLOOD,UA: NORMAL
SL AMB POCT CLARITY,UA: CLEAR
SL AMB POCT COLOR,UA: YELLOW
SL AMB POCT KETONES,UA: NORMAL
SL AMB POCT NITRITE,UA: NORMAL
SL AMB POCT PH,UA: 5
SL AMB POCT SPECIFIC GRAVITY,UA: 1.02
SL AMB POCT URINE PROTEIN: NORMAL
SL AMB POCT UROBILINOGEN: 0.2

## 2025-02-24 PROCEDURE — 81003 URINALYSIS AUTO W/O SCOPE: CPT

## 2025-02-24 PROCEDURE — 51720 TREATMENT OF BLADDER LESION: CPT

## 2025-02-24 NOTE — PROGRESS NOTES
"2/24/2025    Aman Vasquez  1950  [id number]    Diagnosis  Chief Complaint    Bladder Cancer         Patient presents for BCG 2 of 6 managed by Dr. Falcon    Plan  Orders Placed This Encounter   Procedures    POCT urine dip auto non-scope     Return to the office in 1 week for BCG # 3 of 6  Patient instructed to call with persistent hematuria, fever, or other concerns.    Procedure BCG treatment 2 of 6  Vitals:    02/24/25 1323   BP: 136/70   BP Location: Left arm   Patient Position: Sitting   Cuff Size: Adult   Weight: 113 kg (250 lb)   Height: 6' 5\" (1.956 m)       Recent Results (from the past 4 hours)   POCT urine dip auto non-scope    Collection Time: 02/24/25  1:45 PM   Result Value Ref Range     COLOR,UA yellow     CLARITY,UA clear     SPECIFIC GRAVITY,UA 1.025      PH,UA 5.0     LEUKOCYTE ESTERASE,UA small     NITRITE,UA neg     GLUCOSE, UA neg     KETONES,UA neg     BILIRUBIN,UA neg     BLOOD,UA moderate     POCT URINE PROTEIN neg     SL AMB POCT UROBILINOGEN 0.2                Bladder instillation     Date/Time  2/24/2025 1:30 PM     Performed by  Dinae Graff RN   Authorized by  Memo Falcon MD     Universal Protocol   Procedure performed by: (Gali Rodríguez RN)  Consent: Verbal consent obtained.  Risks and benefits: risks, benefits and alternatives were discussed  Consent given by: patient  Patient understanding: patient states understanding of the procedure being performed  Patient identity confirmed: verbally with patient      Local anesthesia used: no     Anesthesia   Local anesthesia used: no     Sedation   Patient sedated: no       Procedure Details   Procedure Notes: BCG instilled into the bladder as ordered. Pt tolerated procedure well. Aftercare instructions were reviewed with the patient. He verbalized his understanding of instructions.  Patient tolerance: patient tolerated the procedure well with no immediate complications             Sterile technique employed. Patient prepped and " identified. 16F coude catheter placed. Bladder drained, residual approximately 50mL. The following solution was instilled directly into the bladder via catheter: 1 Vial BCG. Catheter removed. Patient discharged. Patient tolerated procedure.     Administrations This Visit       BCG (NATIVIDAD BCG) intravesical suspension 50 mg       Admin Date  02/24/2025 Action  Given Dose  50 mg Route  Intravesical Documented By  MELISSA De Souza RN

## 2025-02-26 ENCOUNTER — PATIENT OUTREACH (OUTPATIENT)
Dept: HEMATOLOGY ONCOLOGY | Facility: CLINIC | Age: 75
End: 2025-02-26

## 2025-02-26 ENCOUNTER — DOCUMENTATION (OUTPATIENT)
Dept: HEMATOLOGY ONCOLOGY | Facility: CLINIC | Age: 75
End: 2025-02-26

## 2025-02-26 NOTE — PROGRESS NOTES
Outreach made to patient to introduce myself, go over barriers of care, and DT. He asked I call back I about 15 minutes. I will do so.

## 2025-02-26 NOTE — PROGRESS NOTES
I reached out and spoke with Aman, now that consults have been completed with the oncology teams. I introduced myself and explained my role as their Patient Navigator. I reviewed for any barriers to care and offered referrals to supportive services as needed. I reviewed and updated the members assigned to the care team in [facility]. He knows the members of the care team as well as how and when to contact them with any needs.     Distress Thermometer completed at this time. Patient scored 0/10. Based on responses to DT, no indication for referral to SW needed at this time. .     He is currently able to drive and denies any transportation needs.      He denies any uncontrolled symptoms. Discussed role of Palliative Care in symptom and side effect management. Declined referral at this time.    He states that he is eating and drinking as per usual with no unintentional weight loss.   Completed MST and patient Does not meet criteria for referral to Oncology Dietician Services    Patient does not smoke.     He states he is well supported by family and friends.  Community support groups discussed including the Cancer Support Community of the [facility]. Patient declined information at this time.     He feels he has adequate insurance coverage and denies any financial concerns at this time.     He verbalizes managing the schedules well.   Future Appointments   Date Time Provider Department Center   3/3/2025  1:30 PM UROLOGY NURSE [facility]   3/6/2025 10:20 AM Joe Robertson MD [facility]   3/10/2025  1:30 PM UROLOGY NURSE [facility]   3/17/2025  1:30 PM UROLOGY NURSE [facility]   3/24/2025  1:30 PM UROLOGY NURSE [facility]   3/25/2025  9:30 AM Cristofer Llamas MD [facility]   5/22/2025  3:45 PM Memo Falcon MD [facility]   7/18/2025  9:00 AM Cristofer Llamas MD [facility]       He is having cystoscopy on 5/8/25, is he to start prostate RT now or wait until after cystoscopy?    Based on individual needs I will follow up in about 4-6 weeks. I have provided my direct contact information and welcome them to contact me if needs as discussed above change. He was appreciative for the call.

## 2025-03-03 ENCOUNTER — PROCEDURE VISIT (OUTPATIENT)
Dept: UROLOGY | Facility: MEDICAL CENTER | Age: 75
End: 2025-03-03
Payer: COMMERCIAL

## 2025-03-03 VITALS
BODY MASS INDEX: 29.52 KG/M2 | DIASTOLIC BLOOD PRESSURE: 70 MMHG | WEIGHT: 250 LBS | HEIGHT: 77 IN | SYSTOLIC BLOOD PRESSURE: 140 MMHG

## 2025-03-03 DIAGNOSIS — C67.8 MALIGNANT NEOPLASM OF OVERLAPPING SITES OF BLADDER (HCC): Primary | ICD-10-CM

## 2025-03-03 LAB
SL AMB  POCT GLUCOSE, UA: 100
SL AMB LEUKOCYTE ESTERASE,UA: NORMAL
SL AMB POCT BILIRUBIN,UA: NORMAL
SL AMB POCT BLOOD,UA: NORMAL
SL AMB POCT CLARITY,UA: CLEAR
SL AMB POCT COLOR,UA: YELLOW
SL AMB POCT KETONES,UA: NORMAL
SL AMB POCT NITRITE,UA: NORMAL
SL AMB POCT PH,UA: 5
SL AMB POCT SPECIFIC GRAVITY,UA: 1.02
SL AMB POCT URINE PROTEIN: NORMAL
SL AMB POCT UROBILINOGEN: 0.2

## 2025-03-03 PROCEDURE — 81003 URINALYSIS AUTO W/O SCOPE: CPT

## 2025-03-03 PROCEDURE — 51720 TREATMENT OF BLADDER LESION: CPT

## 2025-03-03 NOTE — PROGRESS NOTES
"3/3/2025    Aman Vasquez  1950  [id number]    Diagnosis  Chief Complaint    Bladder Cancer         Patient presents for BCG 3 of 6 managed by Dr. Falcon    Plan  Orders Placed This Encounter   Procedures    POCT urine dip auto non-scope     Return to the office in 1 week for BCG #4 of 6  Patient instructed to call with persistent hematuria, fever, or other concerns.    Procedure BCG treatment 3 of 6  Vitals:    03/03/25 1310   BP: 140/70   BP Location: Left arm   Patient Position: Sitting   Cuff Size: Standard   Weight: 113 kg (250 lb)   Height: 6' 5\" (1.956 m)       Recent Results (from the past 4 hours)   POCT urine dip auto non-scope    Collection Time: 03/03/25  1:22 PM   Result Value Ref Range     COLOR,UA yellow     CLARITY,UA clear     SPECIFIC GRAVITY,UA 1.020      PH,UA 5.0     LEUKOCYTE ESTERASE,UA trace     NITRITE,UA neg     GLUCOSE,      KETONES,UA neg     BILIRUBIN,UA neg     BLOOD,UA moderate     POCT URINE PROTEIN neg     SL AMB POCT UROBILINOGEN 0.2                Bladder instillation     Date/Time  3/3/2025 1:30 PM     Performed by  Diane Graff RN   Authorized by  Memo Falcon MD     Universal Protocol   Procedure performed by: (Elicia Vang RN)  Consent: Verbal consent obtained.  Risks and benefits: risks, benefits and alternatives were discussed  Consent given by: patient  Patient understanding: patient states understanding of the procedure being performed  Patient identity confirmed: verbally with patient      Local anesthesia used: no     Anesthesia   Local anesthesia used: no     Sedation   Patient sedated: no       Procedure Details   Procedure Notes: BCG instilled into the bladder as ordered. Pt tolerated procedure well. Aftercare instructions were reviewed with the patient. He verbalized his understanding of instructions.  Patient tolerance: patient tolerated the procedure well with no immediate complications             Sterile technique employed. Patient prepped and " identified. 16F coude catheter placed. Bladder drained, residual approximately 75 mL. The following solution was instilled directly into the bladder via catheter: 1 Vial BCG. Catheter removed. Patient discharged. Patient tolerated procedure.     Administrations This Visit       BCG (NATIVIDAD BCG) 50 mg in sodium chloride (PF) 0.9 % 50 mL bladder instillation       Admin Date  03/03/2025 Action  Given Dose  50 mg Route  Intravesical Documented By  MELISSA De Souza, RN

## 2025-03-06 ENCOUNTER — TELEPHONE (OUTPATIENT)
Dept: OTHER | Facility: OTHER | Age: 75
End: 2025-03-06

## 2025-03-06 NOTE — TELEPHONE ENCOUNTER
Patient contacted via telephone provided in demographics. Two identifiers verified. Cancelled appointment, per patient, and rescheduled to 11 March @ 0830 with ANGELIKA Turner in [facility], No other requests or concerns at this time.

## 2025-03-06 NOTE — TELEPHONE ENCOUNTER
Patient called in regarding his appointment. Patient would like to see if there is an earlier slot available for him to come in today. Please call Patient back.

## 2025-03-07 ENCOUNTER — PATIENT OUTREACH (OUTPATIENT)
Dept: HEMATOLOGY ONCOLOGY | Facility: CLINIC | Age: 75
End: 2025-03-07

## 2025-03-07 ENCOUNTER — TELEPHONE (OUTPATIENT)
Dept: UROLOGY | Facility: MEDICAL CENTER | Age: 75
End: 2025-03-07

## 2025-03-07 DIAGNOSIS — D49.4 BLADDER TUMOR: Primary | ICD-10-CM

## 2025-03-07 NOTE — PROGRESS NOTES
Lisette Jones MD  You; Memo Falcon MD8 days ago     AR  Per my last discussion with the patient, he wanted to just go on ADT and monitor with PSA and imaging for now, and defer radiation therapy for now. Please let me know if he changed his mind.       Returned patients call to inform him of discussion with Dr Jones. No answer and unable to LM. I will try again later

## 2025-03-10 ENCOUNTER — PROCEDURE VISIT (OUTPATIENT)
Dept: UROLOGY | Facility: MEDICAL CENTER | Age: 75
End: 2025-03-10
Payer: COMMERCIAL

## 2025-03-10 VITALS
HEIGHT: 77 IN | BODY MASS INDEX: 29.76 KG/M2 | SYSTOLIC BLOOD PRESSURE: 140 MMHG | DIASTOLIC BLOOD PRESSURE: 80 MMHG | WEIGHT: 252 LBS

## 2025-03-10 DIAGNOSIS — D49.4 BLADDER TUMOR: Primary | ICD-10-CM

## 2025-03-10 LAB
SL AMB  POCT GLUCOSE, UA: NORMAL
SL AMB LEUKOCYTE ESTERASE,UA: NORMAL
SL AMB POCT BILIRUBIN,UA: NORMAL
SL AMB POCT BLOOD,UA: NORMAL
SL AMB POCT CLARITY,UA: CLEAR
SL AMB POCT COLOR,UA: YELLOW
SL AMB POCT KETONES,UA: NORMAL
SL AMB POCT NITRITE,UA: NORMAL
SL AMB POCT PH,UA: 5.5
SL AMB POCT SPECIFIC GRAVITY,UA: 1.02
SL AMB POCT URINE PROTEIN: NORMAL
SL AMB POCT UROBILINOGEN: 0.2

## 2025-03-10 PROCEDURE — 51720 TREATMENT OF BLADDER LESION: CPT

## 2025-03-10 PROCEDURE — 81003 URINALYSIS AUTO W/O SCOPE: CPT

## 2025-03-10 NOTE — PROGRESS NOTES
"3/10/2025    Aman Vasquez  1950  [id number]    Diagnosis  Chief Complaint    Bladder Cancer         Patient presents for BCG 4 of 6 managed by Dr. Falcon    Plan  Orders Placed This Encounter   Procedures    POCT urine dip auto non-scope     Return to the office in 1 week for BCG #5 of 6  Patient instructed to call with persistent hematuria, fever, or other concerns.    Procedure BCG treatment 4 of 6  Vitals:    03/10/25 1253   BP: 140/80   BP Location: Left arm   Patient Position: Sitting   Cuff Size: Standard   Weight: 114 kg (252 lb)   Height: 6' 5\" (1.956 m)       Recent Results (from the past 4 hours)   POCT urine dip auto non-scope    Collection Time: 03/10/25  1:13 PM   Result Value Ref Range     COLOR,UA yellow     CLARITY,UA clear     SPECIFIC GRAVITY,UA 1.020      PH,UA 5.5     LEUKOCYTE ESTERASE,UA trace     NITRITE,UA neg     GLUCOSE, UA neg     KETONES,UA neg     BILIRUBIN,UA neg     BLOOD,UA small     POCT URINE PROTEIN neg     SL AMB POCT UROBILINOGEN 0.2                Bladder instillation     Date/Time  3/10/2025 1:30 PM     Performed by  Diane Graff RN   Authorized by  Memo Falcon MD     Universal Protocol   Procedure performed by: (Elicia Vang RN)  Consent: Verbal consent obtained.  Risks and benefits: risks, benefits and alternatives were discussed  Consent given by: patient  Patient understanding: patient states understanding of the procedure being performed  Patient identity confirmed: verbally with patient      Local anesthesia used: no     Anesthesia   Local anesthesia used: no     Sedation   Patient sedated: no       Procedure Details   Procedure Notes: BCG instilled into the bladder as ordered. Pt tolerated procedure well. Aftercare instructions were reviewed with the patient. He verbalized his understanding of instructions.  Patient tolerance: patient tolerated the procedure well with no immediate complications             Sterile technique employed. Patient prepped and " identified. 16F coude catheter placed. Bladder drained, residual approximately 25 mL. The following solution was instilled directly into the bladder via catheter: 1 Vial BCG. Catheter removed. Patient discharged. Patient tolerated procedure.     Administrations This Visit       BCG (NATIVIDAD BCG) intravesical suspension 50 mg       Admin Date  03/10/2025 Action  Given Dose  50 mg Route  Intravesical Documented By  MELISSA De Souza RN

## 2025-03-11 ENCOUNTER — OFFICE VISIT (OUTPATIENT)
Age: 75
End: 2025-03-11
Payer: COMMERCIAL

## 2025-03-11 VITALS — TEMPERATURE: 97.2 F | BODY MASS INDEX: 29.88 KG/M2 | WEIGHT: 252 LBS

## 2025-03-11 DIAGNOSIS — R21 RASH: Primary | ICD-10-CM

## 2025-03-11 PROCEDURE — 88305 TISSUE EXAM BY PATHOLOGIST: CPT | Performed by: STUDENT IN AN ORGANIZED HEALTH CARE EDUCATION/TRAINING PROGRAM

## 2025-03-11 PROCEDURE — 11104 PUNCH BX SKIN SINGLE LESION: CPT

## 2025-03-11 PROCEDURE — 88312 SPECIAL STAINS GROUP 1: CPT | Performed by: STUDENT IN AN ORGANIZED HEALTH CARE EDUCATION/TRAINING PROGRAM

## 2025-03-11 PROCEDURE — 88341 IMHCHEM/IMCYTCHM EA ADD ANTB: CPT | Performed by: STUDENT IN AN ORGANIZED HEALTH CARE EDUCATION/TRAINING PROGRAM

## 2025-03-11 PROCEDURE — 88313 SPECIAL STAINS GROUP 2: CPT | Performed by: STUDENT IN AN ORGANIZED HEALTH CARE EDUCATION/TRAINING PROGRAM

## 2025-03-11 PROCEDURE — 88342 IMHCHEM/IMCYTCHM 1ST ANTB: CPT | Performed by: STUDENT IN AN ORGANIZED HEALTH CARE EDUCATION/TRAINING PROGRAM

## 2025-03-11 NOTE — PROGRESS NOTES
"[facility] Note     Patient Name: Aman Vasquez  Encounter Date: 3/11/2025     Have you been cared for by a [facility] Dermatologist in the last 3 years and, if so, which description applies to you?    Yes.  I have been here within the last 3 years, and my medical history has NOT changed since that time.  I am MALE/not capable of bearing children.    REVIEW OF SYSTEMS:  Have you recently had or currently have any of the following? No changes in my recent health.   PAST MEDICAL HISTORY:  Have you personally ever had or currently have any of the following?  If \"YES,\" then please provide more detail. No changes in my medical history.   HISTORY OF IMMUNOSUPPRESSION: Do you have a history of any of the following:  Systemic Immunosuppression such as Diabetes, Biologic or Immunotherapy, Chemotherapy, Organ Transplantation, Bone Marrow Transplantation or Prednisone?  No     Answering \"YES\" requires the addition of the dotphrase \"IMMUNOSUPPRESSED\" as the first diagnosis of the patient's visit.   FAMILY HISTORY:  Any \"first degree relatives\" (parent, brother, sister, or child) with the following?    No changes in my family's known health.   PATIENT EXPERIENCE:    Do you want the Dermatologist to perform a COMPLETE skin exam today including a clinical examination under the \"bra and underwear\" areas?  NO  If necessary, do we have your permission to call and leave a detailed message on your Preferred Phone number that includes your specific medical information?  Yes      No Known Allergies   Current Outpatient Medications:     Apalutamide (Erleada) 60 MG TABS, Take 4 tablets by mouth daily (240 mg total), Disp: 120 tablet, Rfl: 5    ibuprofen (MOTRIN) 600 mg tablet, Take 1 tablet (600 mg total) by mouth every 8 (eight) hours as needed for moderate pain (Patient not taking: Reported on 1/22/2025), Disp: 30 tablet, Rfl: 0    mupirocin (BACTROBAN) 2 % ointment, Apply twice a day for two weeks (Patient not taking: " Reported on 1/22/2025), Disp: 30 g, Rfl: 0    oxybutynin (DITROPAN-XL) 10 MG 24 hr tablet, Take 1 tablet (10 mg total) by mouth daily at bedtime Take as needed for bladder spasms from [place], Disp: 5 tablet, Rfl: 0    phenazopyridine (PYRIDIUM) 200 mg tablet, Take 1 tablet (200 mg total) by mouth 3 (three) times a day with meals (Patient not taking: Reported on 1/22/2025), Disp: 10 tablet, Rfl: 0    Relugolix (Orgovyx) 120 MG TABS, Take 360 mg by mouth daily Once, followed by 240 mg daily thereafter (Patient not taking: Reported on 3/10/2025), Disp: 61 tablet, Rfl: 0    Relugolix (Orgovyx) 120 MG TABS, Take 2 tablets by mouth daily (240 mg total), Disp: 60 tablet, Rfl: 5    triamcinolone (KENALOG) 0.1 % ointment, Apply topically 2 (two) times a day Apply to affected area twice daily for 2 weeks and then taper to 3 times a week as needed (Patient not taking: Reported on 1/22/2025), Disp: 80 g, Rfl: 2  No current facility-administered medications for this visit.          Whom besides the patient is providing clinical information about today's encounter?   NO ADDITIONAL HISTORIAN (patient alone provided history)    Physical Exam and Assessment/Plan by Diagnosis:    RASH    Physical Exam:  (Anatomic Location); (Size and Morphological Description); (Differential Diagnosis):  Specimen A: central upper back; thin eroded plaque; ddx: Impetigo vs. Ecthyma vs. Contact Dermatitis vs. Superficial PG         Additional History of Present Condition:  Patient presents as a follow up for a rash on the central upper back. Patient notes it initially improved but started to worsen again. Prior biopsy done on 12/23/2025 which demonstrated non-specific findings suggestive of trauma/irritation. Patient was advised to apply mupirocin ointment.  At prior nurse visit for suture removal rash seemed to be improving, Dr. Robertson prescribed triamcinolone 0.1% ointment to be applied for 2 weeks straight then three days a week. Patient denies  "improvement with topical triamcinolone 0.1% ointment but states he is having a hard time applying the ointment to the area due to location. He notes over the counter hydrocortisone is helping. Patient denies any irritation with friction or scratching at the area.     Assessment and Plan:  I have discussed with the patient that a sample of skin via a \"skin biopsy” would be potentially helpful to further make a specific diagnosis under the microscope.  Based on a thorough discussion of this condition and the management approach to it (including a comprehensive discussion of the known risks, side effects and potential benefits of treatment), the patient (family) agrees to implement the following specific plan:    Procedure:  Skin Biopsy.  After a thorough discussion of treatment options and risk/benefits/alternatives (including but not limited to local pain, scarring, dyspigmentation, blistering, possible superinfection, and inability to confirm a diagnosis via histopathology), verbal and written consent were obtained and portion of the rash was biopsied for tissue sample.  See below for consent that was obtained from patient and subsequent Procedure Note.  Will call with results.   Can continue to apply hydrocortisone to rash, avoiding site of biopsy. Can apply twice a day for 2 weeks then three times a day as needed.   Advised to purchase back applicator from an  or ask a friend to help apply hydrocortisone and with wound changes. Offered nurse visits as well for wound care. Patient will try the back applicator and ask his friend.     PROCEDURE NOTE:  PUNCH BIOPSY      Performing Physician:  Stephanie Turner PA-C    Anatomic Location; Clinical Description with size (cm); Pre-Op Diagnosis:    Specimen A: central upper back; thin eroded plaque; ddx: Impetigo vs. Ecthyma vs. Contact Dermatitis vs. Superficial PG      Anesthesia: 1% xylocaine with epi       Topical anesthesia: None       Indications: To " indicate diagnosis and management plan.    Procedure Details     Patient informed of the risks (including bleeding,scaring and infection) and benefits of the procedure explained. Verbal and written informed consent obtained. The area was prepped and draped in the usual fashion. Anesthesia was obtained with 1% lidocaine with epinephrine. The skin was then stretched perpendicular to the skin tension lines and a punch biopsy to an appropriate sampling depth was obtained with a 6 mm punch with a forceps and iris scissors.     Hemostasis was obtained with Gelfoam x 0 sutures.     Complications:  None      Specimen has been sent for review by Dermatopathology.      Plan:  1. Instructed to keep the wound dry and covered for 24-48h and clean thereafter.  2. Warning signs of infection were reviewed.    3. Recommended that the patient use acetaminophen as needed for pain      Standard post-procedure care has been explained and has been included in written form within the patient's copy of Informed Consent.      Scribe Attestation      I,:  Luis A Hall MA am acting as a scribe while in the presence of the attending physician.:       I,:  Stephanie Turner PA-C personally performed the services described in this documentation    as scribed in my presence.:

## 2025-03-14 ENCOUNTER — TELEPHONE (OUTPATIENT)
Dept: UROLOGY | Facility: MEDICAL CENTER | Age: 75
End: 2025-03-14

## 2025-03-14 DIAGNOSIS — D49.4 BLADDER TUMOR: Primary | ICD-10-CM

## 2025-03-14 PROCEDURE — 88342 IMHCHEM/IMCYTCHM 1ST ANTB: CPT | Performed by: STUDENT IN AN ORGANIZED HEALTH CARE EDUCATION/TRAINING PROGRAM

## 2025-03-14 PROCEDURE — 88341 IMHCHEM/IMCYTCHM EA ADD ANTB: CPT | Performed by: STUDENT IN AN ORGANIZED HEALTH CARE EDUCATION/TRAINING PROGRAM

## 2025-03-14 PROCEDURE — 88312 SPECIAL STAINS GROUP 1: CPT | Performed by: STUDENT IN AN ORGANIZED HEALTH CARE EDUCATION/TRAINING PROGRAM

## 2025-03-14 PROCEDURE — 88313 SPECIAL STAINS GROUP 2: CPT | Performed by: STUDENT IN AN ORGANIZED HEALTH CARE EDUCATION/TRAINING PROGRAM

## 2025-03-14 PROCEDURE — 88305 TISSUE EXAM BY PATHOLOGIST: CPT | Performed by: STUDENT IN AN ORGANIZED HEALTH CARE EDUCATION/TRAINING PROGRAM

## 2025-03-16 NOTE — ASSESSMENT & PLAN NOTE
Aman Vasquez is a 75 y.o. 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

## 2025-03-16 NOTE — PROGRESS NOTES
Name: Aman Vasquez      : 1950      MRN: [id number]  Encounter Provider: Cristofer Llamas MD  Encounter Date: 3/25/2025   Encounter department: [facility]  :  Assessment & Plan  Primary prostate adenocarcinoma (HCC)    Aman Vasquez is a 75 y.o. 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 He lives with his wife, who has dementia.     Original ECO    Today's ECO    Goals and Barriers:  Current Goal: Minimize effects of disease burden, extend life.   Barriers to accomplishing this: None    Patient's Capacity to Self Care:  Patient is able to self care        Aman Vasquez is a 75 y.o. 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  He was referred to urology who noted a firm nodule on the L side of the prostate on HERON, and repeat PSA was 32.1 (24).  MRI Prostate (24) showed PI-RADS 5 which showed a 5.2 cm lesion of the L peripheral zone with probable extra prostatic extension, enlarged R external iliac lymph node, no osseous metastatic disease, 1.2 cm R-sided bladder lesion concerning for urothelial  "carcinoma, and a prostate volume of 51 mL.  A prostate biopsy was performed (12/6/24) which showed adenocarcinoma of the prostate, [place] 4+4=8, Grade Group 4.  PET CT (12/31/24) confirmed MRI findings and showed avid taisha metastases in the lower abdomen and Right pelvis.    TURBT (1/10/25) showed \"High-grade papillary urothelial carcinoma with inverted growth pattern; cannot exclude focal superficial lamina propria invasion\"  . S/p intravesical instillation of gemcitabine.     Colonoscopy (12/18/24) removed a benign rectal polyp.  Punch biopsy (12/23/24) of mid back was benign.    Discussion of decision making  Oncology history updated, accordingly, during this visit  Goals of care/patient communication  I discussed with the patient the clinical course leading up to their cancer diagnosis. I reviewed relevant office notes, imaging reports and pathology result as well.  I told the patient that this is a case of likely incurable disease and what this means. We discussed that the goal of anti-cancer therapy is to provide best quality of life, extend overall survival, and progression free survival as shown in clinical trials. We also discussed that there might be a point when the cancer will no longer respond to this anti-neoplastic therapy. As a result, we also discussed the role of the palliative care team being introduced early in the treatment course. We will be making this referral.  I explained the risks/benefits of the proposed cancer therapy: Nubeqa + Docetaxel vs Erleada + ADT and after discussion including understanding risks of possible life-threatening complications and therapy-related malignancy development, informed consent has been signed and obtained for the Erleada + Orgovyx.        According to  [facility] ClinicalTrials.gov number, [id number]   \"A total of 525 patients were assigned to receive apalutamide plus ADT and 527 to receive placebo plus ADT. The median age was 68 years. A total of 16.4% of the " "patients had undergone prostatectomy or received radiotherapy for localized disease, and 10.7% had received previous docetaxel therapy; 62.7% had high-volume disease, and 37.3% had low-volume disease. At the first interim analysis, with a median of 22.7 months of follow-up, the percentage of patients with radiographic progression-free survival at 24 months was 68.2% in the apalutamide group and 47.5% in the placebo group (hazard ratio for radiographic progression or death, 0.48; 95% confidence interval [CI], 0.39 to 0.60; P<0.001). Overall survival at 24 months was also greater with apalutamide than with placebo (82.4% in the apalutamide group vs. 73.5% in the placebo group; hazard ratio for death, 0.67; 95% CI, 0.51 to 0.89; P=0.005). The frequency of grade 3 or 4 adverse events was 42.2% in the apalutamide group and 40.8% in the placebo group; rash was more common in the apalutamide group.   Conclusions  In this trial involving patients with metastatic, castration-sensitive prostate cancer, overall survival and radiographic progression-free survival were significantly longer with the addition of apalutamide to ADT than with placebo plus ADT, and the side-effect profile did not differ substantially between the two groups.\"  Apalutamide for Metastatic, Castration-Sensitive Prostate Cancer  [place] Journal of Medicine           TNM/Staging At Diagnosis  Cancer Staging   Primary prostate adenocarcinoma (HCC)  Staging form: Prostate, AJCC 8th Edition  - Clinical: Stage IVB (cT2, cN1, cM1, PSA: 32, Grade Group: 4) - Signed by Cristofer Llamas MD on 1/17/2025  Prostate specific antigen (PSA) range: 20 or greater  Christine score: 8  Histologic grading system: 5 grade system    Disease Features/Tumor Markers/Genetics  Tumor Marker:  PSA 22.5 (7/31/24)  PSA 32.1 (12/2/24)  3/24/2025: 0.594  Notable Path Features: adenocarcinoma of the prostate, [place] 4+4=8, Grade Group 4  1/28/2025 [facility] NGS: TP53, SMAD4 " mutations  CARIS NGS: SPOP and TP53 mutation, TMB low and MSI stable  Treatment: Erleada + Orgovyx   Other Supportive care:   Treatment Team Members  Surgeon: Dr. Falcon  Rad Onc  Palliative  Labs  5/10/24 Lipid panel wnl   12/2/24 BMP unremarkable ; CBC  (stable)   Diagnostics  11/26/2024 MRI Prostate: PI-RADS 5 which showed a 5.2 cm lesion of the L peripheral zone with probable extra prostatic extension, enlarged R external iliac lymph node, no osseous metastatic disease, 1.2 cm R-sided bladder lesion concerning for urothelial carcinoma, and a prostate volume of 51 mL.  12/6/2024: Prostate biopsy: adenocarcinoma of the prostate, [place] 4+4=8, Grade Group 4  12/31/2024 PET/CT PSMA: Prostatomegaly, multifocal areas of increased uptake in the R posteriolateral peripheral zone, avid taisha metastasis in the lower abdomen and R pelvis, no seminal vesicle or osseous involvement    Discussion of decision making    I personally reviewed the following lab results, the image studies, pathology, other specialty/physicians consult notes and recommendations, and outside medical records. I had a lengthy discussion with the patient and shared the work-up findings. We discussed the diagnosis and management plan as below. I spent 42 minutes reviewing the records (labs, clinician notes, outside records, medical history, ordering medicine/tests/procedures, monitoring of anti-neoplastic toxicities, interpreting the imaging/labs previously done) and coordination of care as well as direct time with the patient today, of which greater than 50% of the time was spent in counseling and coordination of care with the patient/family.    Plan/Labs   Cont ADT: Orgovyx 240 mg daily, he needs to stop Erleada 240 mg daily due to his insurance not covering it any more. He wants to hold off on Xtandi for now until he tries his keto-low sugar diet  He is not sure if they prefer Leupron and will find out  Follow up with Rad Oncology   Cont to  check PSA, CBC, CMP q12 weeks as standing until 4/2026   referral to palliative care   DEXA now and q2 years thereafter        Follow Up: q 12 weeks    All questions were answered to the patient's satisfaction during this encounter. The patient knows the contact information for our office and knows to reach out for any relevant concerns related to this encounter. They are to call for any temperature 100.4 or higher, new symptoms including but not restricted to shaking chills, decreased appetite, nausea, vomiting, diarrhea, increased fatigue, shortness of breath or chest pain, confusion, and not feeling the strength to come to the clinic. For all other listed problems and medical diagnosis in their chart - they are managed by PCP and/or other specialists, which the patient acknowledges. Thank you very much for your consultation and making us a part of this patient's care. We are continuing to follow closely with you. Please do not hesitate to reach out to me with any additional questions or concerns.    Additional recommendations to follow per attending, Dr. Llamas.    Mohsen Leigh DO, PGY4  Hematology/Oncology Fellow    Cristofer Llamas MD  Hematology & Medical Oncology Staff Physician             Disclaimer: This document was prepared using [employer] technology. If a word or phrase is confusing, or does not make sense, this is likely due to recognition error which was not discovered during this clinician's review. If you believe an error has occurred, please contact me through [employer] service line for iveth?cation.          DATA REVIEW:    Pathology Result:    Final Diagnosis   Date Value Ref Range Status   03/11/2025   Final    A. Skin, central upper back, punch biopsy:    Erosion with superficial epidermal neutrophils and superficial perivascular lymphocytic infiltrate with numerous neutrophils and few eosinophils (see note).    Note: In the appropriate clinical context, the histopathologic  findings are consistent with trauma/irritation and concurrent irritant contact dermatitis. A concurrent eczematous process/allergic contact dermatitis cannot be fully excluded. Clinical pathologic correlation is advised. Pathogenic microorganisms are not seen with PAS and Gram stains. CD3 and  immunostains were reviewed; findings diagnostic of hematologic malignancy or a lymphoproliferative disorder are not appreciated. Significant lymphocytic cytologic atypia is not seen.  Multiple levels examined. If the rash were to progress/persist despite therapy, additional sampling should be sought to exclude development of a more significant disease.         01/10/2025   Final    A. Bladder, Right Lateral Wall, Biopsy:  - High-grade papillary urothelial carcinoma with inverted growth pattern; cannot exclude focal superficial lamina propria invasion.  - Benign muscularis propria present.    B. Bladder, Deep Margin, Biopsy:  - Portions of muscularis propria with chronic inflammation and reactive changes.    C. 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  - Invasion not seen on H&E.  - Benign muscularis propria present.    D. Bladder, Right Dome, Biopsy:  - Rare detached atypical urothelial cells present. See Note.  - Abundant denuded urothelial mucosa with marked reactive changes     12/23/2024   Final    A. Skin, upper mid back, punch biopsy:    Ulceration, superficial small-vessel ectasia, and superficial dermal lymphocytic inflammatory infiltrate with scattered neutrophils (see note).    Note: The histopathologic findings are non-specific, though findings suggestive of trauma/irritation are present. Findings diagnostic of a neutrophilic dermatosis (e.g., pyoderma gangrenosum) are not appreciated (though the diagnosis cannot be fully excluded based upon these histopathologic findings alone). Clinical pathologic correlation is essential. Pathogenic microorganisms are not seen with PAS and  Gram stains. Multiple levels examined. If the rash were to progress/persist despite therapy, additional sampling should be sought to exclude development of a more significant disease.         12/18/2024   Final    A. Polyp, Colorectal, Rectal polyp-cold bx:  - Minute piece of polypoid colonic mucosa, no definitive hyperplastic or adenomatous changes seen       12/06/2024   Final    A. Prostate, L Lat [place], needle Biopsy:   - Adenocarcinoma , immunohistochemically compatible with Prostatic adenocarcinoma  -Christine grade  4 +3  = score  7  -Cribriform pattern: present  -Intraductal carcinoma: present  -Tumor involves one submitted core  -Tumor involves approximately 95% of  core biopsy  - Perineural invasion: Not identified      B. Prostate, L Base, needle Biopsy:   - Adenocarcinoma , immunohistochemically compatible with Prostatic adenocarcinoma  -[place] grade 4+ 4 = score  8  -Cribriform pattern: present  -Intraductal carcinoma: present  -Tumor involves one submitted core  -Tumor involves approximately 80% of  core biopsy  - Perineural invasion: Not identified  Note  A small component of grade 5 can not be ruled out     C. Prostate, L Lat Mid, needle Biopsy:   Adenocarcinoma , compatible with Prostatic adenocarcinoma  -Christine grade 4 + 3=  score  7  -Cribriform pattern: present  -Tumor involves one submitted core  -Tumor involves approximately 95% of  core biopsy  - Perineural invasion: Not identified      D. Prostate, L Mid, needle Biopsy:   - Adenocarcinoma , immunohistochemically compatible with Prostatic adenocarcinoma  -[place] grade 4 + 3=  score  7   Cribriform pattern: present  -Tumor involves one submitted core  -Tumor  discontinuously involves approximately 95% of  core biopsy  - Perineural invasion: present      E. Prostate, L Lat [place], needle Biopsy:   Adenocarcinoma , compatible with Prostatic adenocarcinoma  -[place]ine grade 4 + 3=  score  7  Cribriform pattern: present  -Tumor involves one submitted  core  -Tumor  discontinuously involves approximately 95% of  core biopsy  - Perineural invasion: present      F. Prostate, L [place], eedle Biopsy:   - Adenocarcinoma , immunohistochemically compatible with Prostatic adenocarcinoma  -[place] grade 4 + 3=  score  7   Cribriform pattern: present  -Tumor involves one submitted core  -Tumor  discontinuously involves approximately 90% of  core biopsy  - Perineural invasion: present  Note  A small component of grade 5 can not be ruled out       G. Prostate, R Lat Base, needle Biopsy:   Adenocarcinoma , compatible with Prostatic adenocarcinoma  -[place] grade 4 + 4 =  score  8  Cribriform pattern: present  -Tumor involves one submitted core  -Tumor  discontinuously involves approximately 90% of  core biopsy  - Perineural invasion: present      H. Prostate, R Base,  needle Biopsy:   - Adenocarcinoma , immunohistochemically compatible with Prostatic adenocarcinoma  -[place] grade 4 + 4 =  score  8  Cribriform pattern: present  -Tumor involves one submitted core  -Tumor  discontinuously involves approximately 90% of  core biopsy  - Perineural invasion: present      I. Prostate, R Lat Mid, needle Biopsy:   - Adenocarcinoma , immunohistochemically compatible with Prostatic adenocarcinoma  -[place] grade 4 + 4 =  score  8  Cribriform pattern: not identifed  -Tumor involves one submitted core  -Tumor  discontinuously involves approximately 90% of  core biopsy  - Perineural invasion: not identifed      J. Prostate, R Mid, needle Biopsy:   Adenocarcinoma , compatible with Prostatic adenocarcinoma  -[place] grade 4 + 3=  score  7  -Cribriform Pattern: Not identified  -Tumor involves one submitted core  -Tumor involves approximately 30% of  core biopsy  - Perineural invasion: Not identified      K. Prostate, R Lat [place], needle Biopsy:   Adenocarcinoma , compatible with Prostatic adenocarcinoma  -[place] grade 4 + 3=  score  7  -Cribriform Pattern: Not identified  -Tumor involves one  submitted core  -Tumor discontinuously  involves approximately 90% of  core biopsy  - Perineural invasion: Not identified      L. Prostate, R [place], needle Biopsy:   Adenocarcinoma , compatible with Prostatic adenocarcinoma  -[place]ine grade 3 + 3=  score  6  Tumor involves one submitted core  -Tumor discontinuously  involves approximately 60% of  core biopsy  - Perineural invasion: Not identified    Note  Tumor cells are  positive for Prostatic marker (NKX3.1 ( diffuse) , PSAP ) and CDX2 ( patchy nuclear staining), P16 ( patchy cytoplasmic  staining) and negative for CK20  (colon marker) . Stains performed on multiple blocks Controls reacted appropriately     Based on this staining pattern and combined with high serum PSA, the tumor is consistent with Prostatic adenocarcinoma , however  because of positive staining for ( CDX2, P16)  extension from other organs including colorectal rectal carcinoma can not be  ruled out. Additional investigations including GI endoscopy  are recommend for definite diagnosis     12/03/2024   Final    A. Urine, Clean Catch, :  Suspicious for high grade urothelial carcinoma (SHGUC) - see comment.  Atypical but degenerative cells with nuclear enlargement, irregularities and hyperchromasia.    Comment:  The above diagnostic category is from the recently published book, The [place] System for Reporting Urinary Cytology, and is in keeping with the ongoing effort for utilization of standardized diagnostic terminology in urine cytology.*    *The [place] System for Reporting Urinary Cytology. Shelby Rosales, Viktoriya Heaton, Bishnu Lea; 2016.            Bladder: Surveillance as per Urology, currently appears to be [id number]      History of Present Illness   No chief complaint on file.    12/2/2024 PSA: 32    12/3/2024: Urine, Clean Catch, :  Suspicious for high grade urothelial carcinoma    12/6/2024 Prostate bx  G4+4 (8)    12/31/2024 PET/CT PSMA Prostatomegaly with multifocal areas of  increased PSMA activity particularly in the right posterolateral peripheral zone in keeping with recent biopsy-proven prostate cancer. Radiotracer avid taisha metastases in the lower abdomen and right pelvis    1/10/2025: Bladder, Right Lateral Wall, Biopsy:  - High-grade papillary urothelial carcinoma with inverted growth pattern; cannot exclude focal superficial lamina propria invasion.  - Benign muscularis propria present    He has not had any issues with either of his prostate pills: no hot flashes, night sweats, mood swings.    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)  Staging form: Urinary Bladder, AJCC 8th Edition  - Pathologic: pT1, cN0, cM0 - Signed by Lisette Jones MD on 1/21/2025  Stage used in treatment planning: Yes  National guidelines used in treatment planning: Yes  Type of national guideline used in treatment planning: NCCN  Oncology History   Primary prostate adenocarcinoma (HCC)   1/17/2025 Initial Diagnosis    Primary prostate adenocarcinoma (HCC)     1/17/2025 -  Cancer Staged    Staging form: Prostate, AJCC 8th Edition  - Clinical: Stage IVB (cT2, cN1, cM1, PSA: 32, Grade Group: 4) - Signed by Cristofer Llamas MD on 1/17/2025  Prostate specific antigen (PSA) range: 20 or greater  Christine score: 8  Histologic grading system: 5 grade system       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)   1/17/2025 Initial Diagnosis    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   Denies any B symptoms and denies any urinary symptoms.   Worked in electricity and served in the  , in [place] with known exposure to asbestos as well as agent orange.     Objective   /88 (BP Location: Left arm, Patient Position: Sitting, Cuff Size: Adult)   Pulse 81   Temp (!) 97.4 °F (36.3 °C) (Temporal)   Resp 16   Ht 6' 5\" (1.956 m)   Wt 112 kg (247 lb)   SpO2 98%   BMI 29.29 kg/m²     Pain Screening:  Pain Score: 0-No pain    Physical Exam  General: Appearance: alert, cooperative, no distress.  HEENT: Normocephalic, atraumatic. No scleral icterus. conjunctivae clear. EOMI.  Chest: No tenderness to palpation. No open wound noted.  Lungs: Clear to auscultation bilaterally, Respirations unlabored.  Cardiac: Regular rate and rhythm, +S1and S2, -r/m/g  Abdomen: Soft, non-tender, non-distended. Bowel sounds are normal. No hepatosplenomegaly noted.  Extremities:  No edema, cyanosis, clubbing.  Skin: Skin color, turgor are normal. No rashes.  Lymphatics: no palpable supra-cervical, axillary, or inguinal adenopathy  Neurologic: Awake, Alert, and oriented, no gross focal deficits noted b/l.     Labs: I have reviewed the following labs:  Lab Results   Component Value Date/Time    WBC 6.15 01/28/2025 09:54 AM    RBC 4.52 01/28/2025 09:54 AM    Hemoglobin 14.1 01/28/2025 09:54 AM    Hematocrit 42.0 01/28/2025 09:54 AM    MCV 93 01/28/2025 09:54 AM    MCH 31.2 01/28/2025 09:54 AM    RDW 14.0 01/28/2025 09:54 AM    Platelets 245 01/28/2025 09:54 AM    Segmented % 68 01/28/2025 09:54 AM    Lymphocytes % 20 01/28/2025 09:54 AM    " Monocytes % 9 01/28/2025 09:54 AM    Eosinophils Relative 1 01/28/2025 09:54 AM    Basophils Relative 1 01/28/2025 09:54 AM    Immature Grans % 1 01/28/2025 09:54 AM    Absolute Neutrophils 4.28 01/28/2025 09:54 AM     Lab Results   Component Value Date/Time    Potassium 4.4 01/28/2025 09:54 AM    Chloride 103 01/28/2025 09:54 AM    CO2 21 01/28/2025 09:54 AM    BUN 16 01/28/2025 09:54 AM    Creatinine 0.94 01/28/2025 09:54 AM    Glucose, Fasting 65 01/28/2025 09:54 AM    Calcium 9.4 01/28/2025 09:54 AM    AST 22 01/28/2025 09:54 AM    ALT 17 01/28/2025 09:54 AM    Alkaline Phosphatase 104 01/28/2025 09:54 AM    Total Protein 7.4 01/28/2025 09:54 AM    Albumin 4.2 01/28/2025 09:54 AM    Total Bilirubin 1.30 (H) 01/28/2025 09:54 AM    eGFR 77 01/28/2025 09:54 AM

## 2025-03-17 ENCOUNTER — PROCEDURE VISIT (OUTPATIENT)
Dept: UROLOGY | Facility: MEDICAL CENTER | Age: 75
End: 2025-03-17
Payer: COMMERCIAL

## 2025-03-17 VITALS
BODY MASS INDEX: 29.4 KG/M2 | DIASTOLIC BLOOD PRESSURE: 72 MMHG | WEIGHT: 249 LBS | HEIGHT: 77 IN | SYSTOLIC BLOOD PRESSURE: 140 MMHG

## 2025-03-17 DIAGNOSIS — D49.4 BLADDER TUMOR: Primary | ICD-10-CM

## 2025-03-17 LAB
SL AMB  POCT GLUCOSE, UA: NEGATIVE
SL AMB LEUKOCYTE ESTERASE,UA: NEGATIVE
SL AMB POCT BILIRUBIN,UA: NEGATIVE
SL AMB POCT BLOOD,UA: NEGATIVE
SL AMB POCT CLARITY,UA: CLEAR
SL AMB POCT COLOR,UA: YELLOW
SL AMB POCT KETONES,UA: NEGATIVE
SL AMB POCT NITRITE,UA: NEGATIVE
SL AMB POCT PH,UA: 5.5
SL AMB POCT SPECIFIC GRAVITY,UA: 1.02
SL AMB POCT URINE PROTEIN: NEGATIVE
SL AMB POCT UROBILINOGEN: 0.2

## 2025-03-17 PROCEDURE — 51720 TREATMENT OF BLADDER LESION: CPT

## 2025-03-17 PROCEDURE — 81003 URINALYSIS AUTO W/O SCOPE: CPT

## 2025-03-17 NOTE — PROGRESS NOTES
"3/17/2025    Aman Vasquez  1950  [id number]    Diagnosis  Chief Complaint    Bladder Cancer         Patient presents for BCG 5 of 6 managed by Dr. Falcon    Plan  Orders Placed This Encounter   Procedures    POCT urine dip auto non-scope     Return to the office in 1 week for BCG #6  Patient instructed to call with persistent hematuria, fever, or other concerns.    Procedure BCG treatment 5 of 6  Vitals:    03/17/25 1307   BP: 140/72   BP Location: Left arm   Patient Position: Sitting   Cuff Size: Adult   Weight: 113 kg (249 lb)   Height: 6' 5\" (1.956 m)       Recent Results (from the past 4 hours)   POCT urine dip auto non-scope    Collection Time: 03/17/25  1:34 PM   Result Value Ref Range     COLOR,UA yellow     CLARITY,UA clear     SPECIFIC GRAVITY,UA 1.020      PH,UA 5.5     LEUKOCYTE ESTERASE,UA negative     NITRITE,UA negative     GLUCOSE, UA negative     KETONES,UA negative     BILIRUBIN,UA negative     BLOOD,UA negative     POCT URINE PROTEIN negative     SL AMB POCT UROBILINOGEN 0.2                Bladder instillation     Date/Time  3/17/2025 1:30 PM     Performed by  Elicia Vang RN   Authorized by  Memo Falcon MD     Universal Protocol   Procedure performed by: (Diane Grfaf RN)  Consent: Verbal consent obtained.  Risks and benefits: risks, benefits and alternatives were discussed  Consent given by: patient  Patient understanding: patient states understanding of the procedure being performed  Patient identity confirmed: verbally with patient      Local anesthesia used: no     Anesthesia   Local anesthesia used: no     Sedation   Patient sedated: no        Specimen: no    Culture: no   Procedure Details   Procedure Notes: BCG 50mg instilled into the bladder as ordered. Pt tolerated procedure well. Aftercare instructions were reviewed with the patient. He verbalized his understanding of instructions.     Patient tolerance: patient tolerated the procedure well with no immediate complications   "           Sterile technique employed. Patient prepped and identified. 16F coude tipped catheter placed. Bladder drained, residual approximately 75mL. The following solution was instilled directly into the bladder via catheter: 1 Vial BCG. Catheter removed. Patient discharged. Patient tolerated procedure.     Administrations This Visit       BCG (NATIVIDAD BCG) intravesical suspension 50 mg       Admin Date  03/17/2025 Action  Given Dose  50 mg Route  Intravesical Documented By  MELISSA Ruggiero RN

## 2025-03-18 ENCOUNTER — RESULTS FOLLOW-UP (OUTPATIENT)
Dept: DERMATOLOGY | Facility: CLINIC | Age: 75
End: 2025-03-18

## 2025-03-18 NOTE — RESULT ENCOUNTER NOTE
DERMATOPATHOLOGY RESULT NOTE    Results reviewed and discussed with Dr. Robertson which demonstrated findings consistent with trauma and irritation and concurrent contact dermatitis. Recommend wound care. Called and discussed results and plan with patient. He expressed understanding. Offered nurse visits three days a week. Patient would like to schedule this as he is unable to reach the area.       Instructions for Clinical Derm Team:   (remember to route Result Note to appropriate staff):    None    Result & Plan by Specimen:    Specimen A: benign  Plan: recommend wound care three days a week which includes cleansing of area and dressing changes. Per Dr. Robertson Vaseline is to be applied followed by Xeroform, Tefla gauze and Hipafix. Will have staff set up nursing visits three days a week for this. Patient would like to come to  for these.     [id number]  Order: [id number]   Status: Final result      Dx: Rash    Test Result Released: Yes (not seen)    View Follow-Up Encounter     Component  Ref Range & Units (hover)   Case Report  Surgical Pathology Report                         Case: [id number]                                  Authorizing Provider:  Stephanie Turner PA-C     Collected:           03/11/2025 1051              Ordering Location:     [facility]      Received:            03/11/2025 1051                                     [place]                                                                    Pathologist:           Becky Ko MD                                                          Specimen:    Skin, Other, A- Central Upper Back                                                      Final Diagnosis  A. Skin, central upper back, punch biopsy:    Erosion with superficial epidermal neutrophils and superficial perivascular lymphocytic infiltrate with numerous neutrophils and few eosinophils (see note).    Note: In the appropriate clinical context, the histopathologic findings  "are consistent with trauma/irritation and concurrent irritant contact dermatitis. A concurrent eczematous process/allergic contact dermatitis cannot be fully excluded. Clinical pathologic correlation is advised. Pathogenic microorganisms are not seen with PAS and Gram stains. CD3 and  immunostains were reviewed; findings diagnostic of hematologic malignancy or a lymphoproliferative disorder are not appreciated. Significant lymphocytic cytologic atypia is not seen.  Multiple levels examined. If the rash were to progress/persist despite therapy, additional sampling should be sought to exclude development of a more significant disease.      Electronically signed by Becky Ko MD on 3/14/2025 at 1449 EDT  Additional Information   All reported additional testing was performed with appropriately reactive controls.  These tests were developed and their performance characteristics determined by [facility] or appropriate performing facility, though some tests may be performed on tissues which have not been validated for performance characteristics (such as staining performed on alcohol exposed cell blocks and decalcified tissues).  Results should be interpreted with caution and in the context of the patients' clinical condition. These tests may not be cleared or approved by the U.S. Food and Drug Administration, though the FDA has determined that such clearance or approval is not necessary. These tests are used for clinical purposes and they should not be regarded as investigational or for research. This laboratory has been approved by CLIA 88, designated as a high-complexity laboratory and is qualified to perform these tests.  .  Gross Description   A. The specimen is received in formalin, labeled with the patient's name and hospital number, and is designated \" central upper back\".  The specimen consists of a tan portion of skin measuring 0.5 to 0.6 cm in diameter, excised to a depth of 0.2 " cm.  The skin surface appears erythematous.  The margin is inked green.  The skin surface is inked red.  The specimen is bisected.  Entirely submitted. One cassette.  Between sponges.    Note: The estimated total formalin fixation time based upon information provided by the submitting clinician and the standard processing schedule is under 72 hours.    [facility]  Clinical Information   ATTENTION:  DERMPATH GROUP    SPECIMEN A; Skin; Anatomic Location: Central upper back; Procedure/Protocol: Skin Specimen (submit in FORMALIN):Punch Biopsy (when a punch biopsy tool is used; simple closure is included) (CPT 05927; each additional punch biopsy is CPT 11540)  75 y.o. year old  Male with a Morphological Description:thin eroded plaque  Differential Diagnosis and/or Specific Clinical Question: ddx: Impetigo vs. Ecthyma vs. Contact Dermatitis vs. Superficial PG  Resulting Agency [facility]          Specimen Collected: 03/11/25 10:51 AM Last Resulted: 03/14/25  2:49 PM

## 2025-03-20 ENCOUNTER — CLINICAL SUPPORT (OUTPATIENT)
Dept: DERMATOLOGY | Facility: CLINIC | Age: 75
End: 2025-03-20
Payer: COMMERCIAL

## 2025-03-20 DIAGNOSIS — Z51.89 VISIT FOR WOUND CHECK: Primary | ICD-10-CM

## 2025-03-20 PROCEDURE — 99212 OFFICE O/P EST SF 10 MIN: CPT

## 2025-03-20 NOTE — PROGRESS NOTES
WOUND CHECK    Physical Exam:  Anatomic Location Affected:  Central upper back   Description of wound: healing wound   Closure Type: Biopsy only     Additional History of Present Condition:  Patient presents for wound check s/p punch bx of a rash on the back that came back as contact dermatitis. Patient unable to reach back to properly care for this wound which is why he comes in for wound check/care. Patient denies any pain or signs of infection      Assessment and Plan:  Based on a thorough discussion of this condition and the management approach to it (including a comprehensive discussion of the known risks, side effects and potential benefits of treatment), the patient (family) agrees to implement the following specific plan:  Vaseline and a telfa pad applied today   Follow up next Tuesday for dressing change     Apply vaseline after showering with mild soap such as dove or cera ve

## 2025-03-21 ENCOUNTER — TELEPHONE (OUTPATIENT)
Dept: UROLOGY | Facility: MEDICAL CENTER | Age: 75
End: 2025-03-21

## 2025-03-21 ENCOUNTER — TELEPHONE (OUTPATIENT)
Age: 75
End: 2025-03-21

## 2025-03-21 DIAGNOSIS — D49.4 BLADDER TUMOR: Primary | ICD-10-CM

## 2025-03-21 NOTE — TELEPHONE ENCOUNTER
Left voicemail for pt advising he should continue Orgovyx 120 mg daily. Pt has a follow up appt coming up next week 3/25.

## 2025-03-21 NOTE — TELEPHONE ENCOUNTER
Pt called to report that he got a call from his pharmacy inquiring  if he wanted to refill his medication . Orgovyx. Pt calling to confirm if he is supposed to continue this medication and for how long. Aware that I didn't see any notes regarding for discontinuation. Pt would like to confirm.

## 2025-03-24 ENCOUNTER — PROCEDURE VISIT (OUTPATIENT)
Dept: UROLOGY | Facility: MEDICAL CENTER | Age: 75
End: 2025-03-24
Payer: COMMERCIAL

## 2025-03-24 ENCOUNTER — APPOINTMENT (OUTPATIENT)
Dept: LAB | Age: 75
End: 2025-03-24
Payer: COMMERCIAL

## 2025-03-24 VITALS
BODY MASS INDEX: 29.16 KG/M2 | HEIGHT: 77 IN | DIASTOLIC BLOOD PRESSURE: 80 MMHG | SYSTOLIC BLOOD PRESSURE: 140 MMHG | WEIGHT: 247 LBS

## 2025-03-24 DIAGNOSIS — C61 PRIMARY PROSTATE ADENOCARCINOMA (HCC): ICD-10-CM

## 2025-03-24 DIAGNOSIS — D49.4 BLADDER TUMOR: Primary | ICD-10-CM

## 2025-03-24 LAB
PSA SERPL-MCNC: 0.59 NG/ML (ref 0–4)
SL AMB  POCT GLUCOSE, UA: NORMAL
SL AMB LEUKOCYTE ESTERASE,UA: NORMAL
SL AMB POCT BILIRUBIN,UA: NORMAL
SL AMB POCT BLOOD,UA: NORMAL
SL AMB POCT CLARITY,UA: CLEAR
SL AMB POCT COLOR,UA: YELLOW
SL AMB POCT KETONES,UA: NORMAL
SL AMB POCT NITRITE,UA: NORMAL
SL AMB POCT PH,UA: 5.5
SL AMB POCT SPECIFIC GRAVITY,UA: 1.02
SL AMB POCT URINE PROTEIN: NORMAL
SL AMB POCT UROBILINOGEN: 0.2

## 2025-03-24 PROCEDURE — 81003 URINALYSIS AUTO W/O SCOPE: CPT

## 2025-03-24 PROCEDURE — 51720 TREATMENT OF BLADDER LESION: CPT

## 2025-03-24 PROCEDURE — 84153 ASSAY OF PSA TOTAL: CPT

## 2025-03-24 PROCEDURE — 36415 COLL VENOUS BLD VENIPUNCTURE: CPT

## 2025-03-24 RX ORDER — RELUGOLIX 120 MG/1
2 TABLET, FILM COATED ORAL DAILY
Qty: 60 TABLET | Refills: 5 | Status: SHIPPED | OUTPATIENT
Start: 2025-03-24

## 2025-03-24 NOTE — PROGRESS NOTES
"3/24/2025    Aman Vasquez  1950  [id number]    Diagnosis  Chief Complaint    Bladder Cancer         Patient presents for BCG 6 of 6 managed by Dr. Falcon    Plan  Orders Placed This Encounter   Procedures    POCT urine dip auto non-scope     Surgery scheduled for 5/8/25 with Dr. Falcon  Patient instructed to call with persistent hematuria, fever, or other concerns.    Procedure BCG treatment 6 of 6  Vitals:    03/24/25 1313   BP: 140/80   BP Location: Left arm   Patient Position: Sitting   Cuff Size: Adult   Weight: 112 kg (247 lb)   Height: 6' 5\" (1.956 m)       Recent Results (from the past 4 hours)   POCT urine dip auto non-scope    Collection Time: 03/24/25  1:32 PM   Result Value Ref Range     COLOR,UA yellow     CLARITY,UA clear     SPECIFIC GRAVITY,UA 1.020      PH,UA 5.5     LEUKOCYTE ESTERASE,UA neg     NITRITE,UA neg     GLUCOSE, UA neg     KETONES,UA trace     BILIRUBIN,UA neg     BLOOD,UA trace-lysed     POCT URINE PROTEIN neg     SL AMB POCT UROBILINOGEN 0.2                Bladder instillation     Date/Time  3/24/2025 1:30 PM     Performed by  Diane Graff RN   Authorized by  Memo Falcon MD     Universal Protocol   Procedure performed by: (Elicia Vang RN)  Consent: Verbal consent obtained.  Risks and benefits: risks, benefits and alternatives were discussed  Consent given by: patient  Patient understanding: patient states understanding of the procedure being performed  Patient identity confirmed: verbally with patient      Local anesthesia used: no     Anesthesia   Local anesthesia used: no     Sedation   Patient sedated: no       Procedure Details   Procedure Notes: BCG 50mg instilled into the bladder as ordered. Pt tolerated procedure well. Aftercare instructions were reviewed with the patient. He verbalized his understanding of instructions.  Patient tolerance: patient tolerated the procedure well with no immediate complications             Sterile technique employed. Patient prepped " and identified. 16F coude catheter placed. Bladder drained, residual approximately 25 mL. The following solution was instilled directly into the bladder via catheter: 1 Vial BCG. Catheter removed. Patient discharged. Patient tolerated procedure.           Diane Graff RN

## 2025-03-25 ENCOUNTER — CLINICAL SUPPORT (OUTPATIENT)
Dept: DERMATOLOGY | Facility: CLINIC | Age: 75
End: 2025-03-25
Payer: COMMERCIAL

## 2025-03-25 ENCOUNTER — OFFICE VISIT (OUTPATIENT)
Dept: HEMATOLOGY ONCOLOGY | Facility: CLINIC | Age: 75
End: 2025-03-25
Payer: OTHER GOVERNMENT

## 2025-03-25 ENCOUNTER — TELEPHONE (OUTPATIENT)
Age: 75
End: 2025-03-25

## 2025-03-25 VITALS
HEIGHT: 77 IN | WEIGHT: 247 LBS | OXYGEN SATURATION: 98 % | TEMPERATURE: 97.4 F | DIASTOLIC BLOOD PRESSURE: 88 MMHG | HEART RATE: 81 BPM | BODY MASS INDEX: 29.16 KG/M2 | SYSTOLIC BLOOD PRESSURE: 140 MMHG | RESPIRATION RATE: 16 BRPM

## 2025-03-25 DIAGNOSIS — Z51.89 VISIT FOR WOUND CHECK: Primary | ICD-10-CM

## 2025-03-25 DIAGNOSIS — C61 PRIMARY PROSTATE ADENOCARCINOMA (HCC): Primary | ICD-10-CM

## 2025-03-25 DIAGNOSIS — Z79.818 ENCOUNTER FOR MONITORING ANDROGEN DEPRIVATION THERAPY: ICD-10-CM

## 2025-03-25 PROCEDURE — 99215 OFFICE O/P EST HI 40 MIN: CPT | Performed by: INTERNAL MEDICINE

## 2025-03-25 PROCEDURE — 99212 OFFICE O/P EST SF 10 MIN: CPT | Performed by: STUDENT IN AN ORGANIZED HEALTH CARE EDUCATION/TRAINING PROGRAM

## 2025-03-25 NOTE — PROGRESS NOTES
WOUND CHECK     Physical Exam:  Anatomic Location Affected:  Central upper back   Description of wound: healing wound   Closure Type: Biopsy only      Additional History of Present Condition:  Patient presents for wound check s/p punch bx of a rash on the back that came back as contact dermatitis. Patient unable to reach back to properly care for this wound which is why he comes in for wound check/care. Patient denies any pain or signs of infection       Assessment and Plan:  Based on a thorough discussion of this condition and the management approach to it (including a comprehensive discussion of the known risks, side effects and potential benefits of treatment), the patient (family) agrees to implement the following specific plan:  Vaseline and a telfa pad applied today   Follow up on Thursday for dressing change     Apply vaseline after showering with mild soap such as dove or cera ve

## 2025-03-25 NOTE — TELEPHONE ENCOUNTER
Provider: Dr. Llamas    Patient called to report that it is confirmed that the insurance won't pay for patient's Erleada or Orgovyx.     He states that he will continue to remain on the two, unless Dr. Llamas changes or tells him to stop either one or both of those medications- he wanted me to make provider aware.    Patient has no additional questions or concerns at this moment.

## 2025-03-26 ENCOUNTER — TELEPHONE (OUTPATIENT)
Age: 75
End: 2025-03-26

## 2025-03-26 NOTE — TELEPHONE ENCOUNTER
Patient calling.    [facility] called patient because  would not cover at [facility] the following medications:    Relugolix (Orgovyx) 120 MG TABS     Apalutamide (Erleada) 60 MG TABS     Need Dr Llamas.  Some confusion about orders.

## 2025-03-26 NOTE — TELEPHONE ENCOUNTER
Spoke with pt who tells me [facility] called him this morning stating clarification is needed for his script. I called the [facility] last night and confirmed Orgovyx dosing is correct. Email sent to Rodolfo at [facility] to see if he has any more info.

## 2025-03-27 ENCOUNTER — CLINICAL SUPPORT (OUTPATIENT)
Dept: DERMATOLOGY | Facility: CLINIC | Age: 75
End: 2025-03-27

## 2025-03-27 DIAGNOSIS — Z51.89 VISIT FOR WOUND CHECK: Primary | ICD-10-CM

## 2025-03-27 NOTE — PROGRESS NOTES
WOUND CHECK     Physical Exam:  Anatomic Location Affected:  Central upper back   Description of wound: healing wound   Closure Type: Biopsy only      Additional History of Present Condition:  Patient presents for wound check s/p punch bx of a rash on the back that came back as contact dermatitis. Patient unable to reach back to properly care for this wound which is why he comes in for wound check/care. Patient denies any pain or signs of infection       Assessment and Plan:  Based on a thorough discussion of this condition and the management approach to it (including a comprehensive discussion of the known risks, side effects and potential benefits of treatment), the patient (family) agrees to implement the following specific plan:  Vaseline and a telfa pad applied today   Follow up on Tuesday for dressing change     Apply vaseline after showering with mild soap such as dove or cera ve

## 2025-03-28 ENCOUNTER — PATIENT OUTREACH (OUTPATIENT)
Dept: HEMATOLOGY ONCOLOGY | Facility: CLINIC | Age: 75
End: 2025-03-28

## 2025-03-28 NOTE — PROGRESS NOTES
Lisette Jones MD  You; Memo Falcon MD4 weeks ago     AR  Per my last discussion with the patient, he wanted to just go on ADT and monitor with PSA and imaging for now, and defer radiation therapy for now. Please let me know if he changed his mind.     Returned patients call and replayed message from Dr Jones. I asked patient to call because if this changes. I left my direct number as well. At this time I will sign off, no oncology navigation is needed.

## 2025-03-28 NOTE — TELEPHONE ENCOUNTER
"Per [facility] portal the Erelada is ready to ship and they have been attempting to reach pt. I called [phone] regarding the Orgovyx and was told it's \"in fulfillment\" and should go out today.  "

## 2025-04-03 ENCOUNTER — TELEPHONE (OUTPATIENT)
Age: 75
End: 2025-04-03

## 2025-04-03 NOTE — TELEPHONE ENCOUNTER
Rec'd call from patient will do the bandage change at home has someone to do it for him wants to cancel appointments

## 2025-04-17 ENCOUNTER — CLINICAL SUPPORT (OUTPATIENT)
Dept: DERMATOLOGY | Facility: CLINIC | Age: 75
End: 2025-04-17

## 2025-04-17 DIAGNOSIS — Z51.89 VISIT FOR WOUND CHECK: Primary | ICD-10-CM

## 2025-04-17 DIAGNOSIS — R21 RASH: ICD-10-CM

## 2025-04-17 PROCEDURE — RECHECK: Performed by: DERMATOLOGY

## 2025-04-17 RX ORDER — TRIAMCINOLONE ACETONIDE 1 MG/G
OINTMENT TOPICAL 2 TIMES DAILY
Qty: 453.6 G | Refills: 0 | Status: SHIPPED | OUTPATIENT
Start: 2025-04-17

## 2025-04-17 NOTE — PROGRESS NOTES
WOUND CHECK     Physical Exam:  Anatomic Location Affected:  Central upper back   Description of wound: healed area with scar   Closure Type: Biopsy only      Additional History of Present Condition:  Patient presents for wound check s/p punch bx of a rash on the back that came back as contact dermatitis. Patient denies any pain or signs of infection       Assessment and Plan:  Based on a thorough discussion of this condition and the management approach to it (including a comprehensive discussion of the known risks, side effects and potential benefits of treatment), the patient (family) agrees to implement the following specific plan:  Apply triamcinolone to the back two times a week   Follow up in 6 months   No need fro further wound care as wound is fully healed

## 2025-04-30 ENCOUNTER — APPOINTMENT (OUTPATIENT)
Dept: LAB | Age: 75
End: 2025-04-30
Payer: COMMERCIAL

## 2025-04-30 DIAGNOSIS — D49.4 BLADDER TUMOR: ICD-10-CM

## 2025-04-30 DIAGNOSIS — C61 PRIMARY PROSTATE ADENOCARCINOMA (HCC): ICD-10-CM

## 2025-04-30 DIAGNOSIS — Z01.812 PRE-OPERATIVE LABORATORY EXAMINATION: ICD-10-CM

## 2025-04-30 DIAGNOSIS — R39.89 SUSPECTED UTI: ICD-10-CM

## 2025-04-30 LAB
ALBUMIN SERPL BCG-MCNC: 3.7 G/DL (ref 3.5–5)
ALP SERPL-CCNC: 80 U/L (ref 34–104)
ALT SERPL W P-5'-P-CCNC: 16 U/L (ref 7–52)
ANION GAP SERPL CALCULATED.3IONS-SCNC: 8 MMOL/L (ref 4–13)
AST SERPL W P-5'-P-CCNC: 18 U/L (ref 13–39)
BASOPHILS # BLD AUTO: 0.01 THOUSANDS/ÂΜL (ref 0–0.1)
BASOPHILS NFR BLD AUTO: 0 % (ref 0–1)
BILIRUB SERPL-MCNC: 0.32 MG/DL (ref 0.2–1)
BUN SERPL-MCNC: 21 MG/DL (ref 5–25)
CALCIUM SERPL-MCNC: 8.7 MG/DL (ref 8.4–10.2)
CHLORIDE SERPL-SCNC: 109 MMOL/L (ref 96–108)
CO2 SERPL-SCNC: 23 MMOL/L (ref 21–32)
CREAT SERPL-MCNC: 1 MG/DL (ref 0.6–1.3)
EOSINOPHIL # BLD AUTO: 0.12 THOUSAND/ÂΜL (ref 0–0.61)
EOSINOPHIL NFR BLD AUTO: 2 % (ref 0–6)
ERYTHROCYTE [DISTWIDTH] IN BLOOD BY AUTOMATED COUNT: 13.8 % (ref 11.6–15.1)
GFR SERPL CREATININE-BSD FRML MDRD: 72 ML/MIN/1.73SQ M
GLUCOSE P FAST SERPL-MCNC: 99 MG/DL (ref 65–99)
HCT VFR BLD AUTO: 38.8 % (ref 36.5–49.3)
HGB BLD-MCNC: 12.6 G/DL (ref 12–17)
IMM GRANULOCYTES # BLD AUTO: 0.02 THOUSAND/UL (ref 0–0.2)
IMM GRANULOCYTES NFR BLD AUTO: 0 % (ref 0–2)
LYMPHOCYTES # BLD AUTO: 1.36 THOUSANDS/ÂΜL (ref 0.6–4.47)
LYMPHOCYTES NFR BLD AUTO: 26 % (ref 14–44)
MCH RBC QN AUTO: 31 PG (ref 26.8–34.3)
MCHC RBC AUTO-ENTMCNC: 32.5 G/DL (ref 31.4–37.4)
MCV RBC AUTO: 96 FL (ref 82–98)
MONOCYTES # BLD AUTO: 0.36 THOUSAND/ÂΜL (ref 0.17–1.22)
MONOCYTES NFR BLD AUTO: 7 % (ref 4–12)
NEUTROPHILS # BLD AUTO: 3.46 THOUSANDS/ÂΜL (ref 1.85–7.62)
NEUTS SEG NFR BLD AUTO: 65 % (ref 43–75)
NRBC BLD AUTO-RTO: 0 /100 WBCS
PLATELET # BLD AUTO: 133 THOUSANDS/UL (ref 149–390)
PMV BLD AUTO: 11.5 FL (ref 8.9–12.7)
POTASSIUM SERPL-SCNC: 4.1 MMOL/L (ref 3.5–5.3)
PROT SERPL-MCNC: 6.2 G/DL (ref 6.4–8.4)
PSA SERPL-MCNC: 0.17 NG/ML (ref 0–4)
RBC # BLD AUTO: 4.06 MILLION/UL (ref 3.88–5.62)
SODIUM SERPL-SCNC: 140 MMOL/L (ref 135–147)
WBC # BLD AUTO: 5.33 THOUSAND/UL (ref 4.31–10.16)

## 2025-04-30 PROCEDURE — 85025 COMPLETE CBC W/AUTO DIFF WBC: CPT

## 2025-04-30 PROCEDURE — 80053 COMPREHEN METABOLIC PANEL: CPT

## 2025-04-30 PROCEDURE — 84153 ASSAY OF PSA TOTAL: CPT

## 2025-04-30 PROCEDURE — 36415 COLL VENOUS BLD VENIPUNCTURE: CPT

## 2025-04-30 PROCEDURE — 87086 URINE CULTURE/COLONY COUNT: CPT

## 2025-05-01 LAB — BACTERIA UR CULT: NORMAL

## 2025-05-06 ENCOUNTER — TELEPHONE (OUTPATIENT)
Age: 75
End: 2025-05-06

## 2025-05-06 RX ORDER — B-COMPLEX WITH VITAMIN C
250 TABLET ORAL DAILY
COMMUNITY

## 2025-05-06 NOTE — TELEPHONE ENCOUNTER
Patient having surgery on 05/08/25 by DR. Falcon.      Patient called stating he missed the call from Pre admission yesterday and he did leave a message for them to call him back but he did not hear from them to go over his prep for surgery.  Communicated via [place] with SS and phone number was provided for Pre admission [phone]. Patient will call the number.  SS will also reach out.

## 2025-05-06 NOTE — PRE-PROCEDURE INSTRUCTIONS
Pre-Surgery Instructions:   Medication Instructions    Apalutamide (Erleada) 60 MG TABS Take day of surgery.    Relugolix (Orgovyx) 120 MG TABS Take night before surgery    Thiamine HCl (vitamin B-1) 250 MG tablet Stop taking 7 days prior to surgery.     Medication instructions for day of surgery reviewed. Please take all instructed medications with only a sip of water.       You will receive a call one business day prior to surgery with an arrival time and hospital directions. If your surgery is scheduled on a Monday, the hospital will be calling you on the Friday prior to your surgery. If you have not heard from anyone by 8pm, please call the hospital supervisor through the hospital  at [phone]. ([place] [phone] or [place] [phone]).    Do not eat or drink anything after midnight the night before your surgery, including candy, mints, lifesavers, or chewing gum. Do not drink alcohol 24hrs before your surgery. Try not to smoke at least 24hrs before your surgery.       Follow the pre surgery showering instructions as listed in the “My Surgical Experience Booklet” or otherwise provided by your surgeon's office. Do not use a blade to shave the surgical area 1 week before surgery. It is okay to use a clean electric clippers up to 24 hours before surgery. Do not apply any lotions, creams, including makeup, cologne, deodorant, or perfumes after showering on the day of your surgery. Do not use dry shampoo, hair spray, hair gel, or any type of hair products.     No contact lenses, eye make-up, or artificial eyelashes. Remove nail polish, including gel polish, and any artificial, gel, or acrylic nails if possible. Remove all jewelry including rings and body piercing jewelry.     Wear causal clothing that is easy to take on and off. Consider your type of surgery.    Keep any valuables, jewelry, piercings at home. Please bring any specially ordered equipment (sling, braces) if indicated.    Arrange for  a responsible person to drive you to and from the hospital on the day of your surgery. Please confirm the visitor policy for the day of your procedure when you receive your phone call with an arrival time.     Call the surgeon's office with any new illnesses, exposures, or additional questions prior to surgery.    Please reference your “My Surgical Experience Booklet” for additional information to prepare for your upcoming surgery.

## 2025-05-07 ENCOUNTER — ANESTHESIA EVENT (OUTPATIENT)
Dept: PERIOP | Facility: HOSPITAL | Age: 75
End: 2025-05-07
Payer: COMMERCIAL

## 2025-05-08 ENCOUNTER — HOSPITAL ENCOUNTER (OUTPATIENT)
Facility: HOSPITAL | Age: 75
Setting detail: OUTPATIENT SURGERY
Discharge: HOME | End: 2025-05-08
Attending: UROLOGY | Admitting: UROLOGY
Payer: COMMERCIAL

## 2025-05-08 ENCOUNTER — TELEPHONE (OUTPATIENT)
Dept: UROLOGY | Facility: MEDICAL CENTER | Age: 75
End: 2025-05-08

## 2025-05-08 ENCOUNTER — ANESTHESIA (OUTPATIENT)
Dept: PERIOP | Facility: HOSPITAL | Age: 75
End: 2025-05-08
Payer: COMMERCIAL

## 2025-05-08 VITALS
BODY MASS INDEX: 29.28 KG/M2 | SYSTOLIC BLOOD PRESSURE: 128 MMHG | RESPIRATION RATE: 16 BRPM | TEMPERATURE: 97.4 F | WEIGHT: 248 LBS | DIASTOLIC BLOOD PRESSURE: 82 MMHG | HEIGHT: 77 IN | OXYGEN SATURATION: 96 % | HEART RATE: 61 BPM

## 2025-05-08 DIAGNOSIS — C67.8 MALIGNANT NEOPLASM OF OVERLAPPING SITES OF BLADDER (HCC): ICD-10-CM

## 2025-05-08 DIAGNOSIS — C67.9 UROTHELIAL CARCINOMA OF BLADDER WITHOUT INVASION OF MUSCLE (HCC): Primary | ICD-10-CM

## 2025-05-08 PROBLEM — T88.4XXA DIFFICULT INTUBATION: Status: ACTIVE | Noted: 2025-05-08

## 2025-05-08 PROCEDURE — 52234 CYSTOSCOPY AND TREATMENT: CPT | Performed by: UROLOGY

## 2025-05-08 PROCEDURE — 88342 IMHCHEM/IMCYTCHM 1ST ANTB: CPT | Performed by: PATHOLOGY

## 2025-05-08 PROCEDURE — 88307 TISSUE EXAM BY PATHOLOGIST: CPT | Performed by: PATHOLOGY

## 2025-05-08 PROCEDURE — 88341 IMHCHEM/IMCYTCHM EA ADD ANTB: CPT | Performed by: PATHOLOGY

## 2025-05-08 PROCEDURE — NC001 PR NO CHARGE: Performed by: UROLOGY

## 2025-05-08 RX ORDER — FENTANYL CITRATE/PF 50 MCG/ML
25 SYRINGE (ML) INJECTION
Status: DISCONTINUED | OUTPATIENT
Start: 2025-05-08 | End: 2025-05-08

## 2025-05-08 RX ORDER — FENTANYL CITRATE 50 UG/ML
INJECTION, SOLUTION INTRAMUSCULAR; INTRAVENOUS AS NEEDED
Status: DISCONTINUED | OUTPATIENT
Start: 2025-05-08 | End: 2025-05-08

## 2025-05-08 RX ORDER — ONDANSETRON 2 MG/ML
INJECTION INTRAMUSCULAR; INTRAVENOUS AS NEEDED
Status: DISCONTINUED | OUTPATIENT
Start: 2025-05-08 | End: 2025-05-08

## 2025-05-08 RX ORDER — LIDOCAINE HYDROCHLORIDE 10 MG/ML
INJECTION, SOLUTION EPIDURAL; INFILTRATION; INTRACAUDAL; PERINEURAL AS NEEDED
Status: DISCONTINUED | OUTPATIENT
Start: 2025-05-08 | End: 2025-05-08

## 2025-05-08 RX ORDER — PHENAZOPYRIDINE HYDROCHLORIDE 100 MG/1
100 TABLET, FILM COATED ORAL
Status: DISCONTINUED | OUTPATIENT
Start: 2025-05-08 | End: 2025-05-08 | Stop reason: HOSPADM

## 2025-05-08 RX ORDER — CEFAZOLIN SODIUM 2 G/50ML
2000 SOLUTION INTRAVENOUS ONCE
Status: COMPLETED | OUTPATIENT
Start: 2025-05-08 | End: 2025-05-08

## 2025-05-08 RX ORDER — CEPHALEXIN 500 MG/1
500 CAPSULE ORAL EVERY 12 HOURS SCHEDULED
Qty: 6 CAPSULE | Refills: 0 | Status: SHIPPED | OUTPATIENT
Start: 2025-05-08 | End: 2025-05-11

## 2025-05-08 RX ORDER — SODIUM CHLORIDE, SODIUM LACTATE, POTASSIUM CHLORIDE, CALCIUM CHLORIDE 600; 310; 30; 20 MG/100ML; MG/100ML; MG/100ML; MG/100ML
125 INJECTION, SOLUTION INTRAVENOUS CONTINUOUS
Status: DISCONTINUED | OUTPATIENT
Start: 2025-05-08 | End: 2025-05-08 | Stop reason: HOSPADM

## 2025-05-08 RX ORDER — ONDANSETRON 2 MG/ML
4 INJECTION INTRAMUSCULAR; INTRAVENOUS ONCE AS NEEDED
Status: DISCONTINUED | OUTPATIENT
Start: 2025-05-08 | End: 2025-05-08

## 2025-05-08 RX ORDER — PHENAZOPYRIDINE HYDROCHLORIDE 200 MG/1
200 TABLET, FILM COATED ORAL
Qty: 10 TABLET | Refills: 0 | Status: SHIPPED | OUTPATIENT
Start: 2025-05-08

## 2025-05-08 RX ORDER — HYDROCODONE BITARTRATE AND ACETAMINOPHEN 5; 325 MG/1; MG/1
1 TABLET ORAL EVERY 6 HOURS PRN
Status: DISCONTINUED | OUTPATIENT
Start: 2025-05-08 | End: 2025-05-08 | Stop reason: HOSPADM

## 2025-05-08 RX ORDER — PROPOFOL 10 MG/ML
INJECTION, EMULSION INTRAVENOUS AS NEEDED
Status: DISCONTINUED | OUTPATIENT
Start: 2025-05-08 | End: 2025-05-08

## 2025-05-08 RX ORDER — MAGNESIUM HYDROXIDE 1200 MG/15ML
LIQUID ORAL AS NEEDED
Status: DISCONTINUED | OUTPATIENT
Start: 2025-05-08 | End: 2025-05-08 | Stop reason: HOSPADM

## 2025-05-08 RX ADMIN — LIDOCAINE HYDROCHLORIDE 100 MG: 10 INJECTION, SOLUTION EPIDURAL; INFILTRATION; INTRACAUDAL at 12:46

## 2025-05-08 RX ADMIN — FENTANYL CITRATE 50 MCG: 50 INJECTION INTRAMUSCULAR; INTRAVENOUS at 12:44

## 2025-05-08 RX ADMIN — PROPOFOL 150 MG: 10 INJECTION, EMULSION INTRAVENOUS at 12:46

## 2025-05-08 RX ADMIN — SODIUM CHLORIDE, SODIUM LACTATE, POTASSIUM CHLORIDE, AND CALCIUM CHLORIDE 125 ML/HR: .6; .31; .03; .02 INJECTION, SOLUTION INTRAVENOUS at 10:00

## 2025-05-08 RX ADMIN — ONDANSETRON 4 MG: 2 INJECTION INTRAMUSCULAR; INTRAVENOUS at 12:52

## 2025-05-08 RX ADMIN — CEFAZOLIN SODIUM 2000 MG: 2 SOLUTION INTRAVENOUS at 11:53

## 2025-05-08 RX ADMIN — PHENAZOPYRIDINE 100 MG: 100 TABLET ORAL at 14:58

## 2025-05-08 RX ADMIN — FENTANYL CITRATE 50 MCG: 50 INJECTION INTRAMUSCULAR; INTRAVENOUS at 12:46

## 2025-05-08 NOTE — TELEPHONE ENCOUNTER
TURBT today.  Can have Molina removal tomorrow morning with TOV.  Follow up in 2 weeks for pathology review

## 2025-05-08 NOTE — ANESTHESIA PREPROCEDURE EVALUATION
Procedure:  CYSTOSCOPY WITH BIOPSIES, POSSIBLE TURBT (Bladder)    Relevant Problems   ANESTHESIA  Severely limited cervical ROM    1/10/25 Airway note: Attempted DL with MAC 3.  Grade 3 view due to reduced neck ext.  Switche dti Quiros 3 with grade 1 view      CARDIO (within normal limits)      ENDO (within normal limits)      GI/HEPATIC (within normal limits)      /RENAL   (+) BPH with urinary obstruction   (+) Primary prostate adenocarcinoma (HCC)      HEMATOLOGY   (+) Anemia   (+) Thrombocytopenia (HCC)      MUSCULOSKELETAL  Limited cervical spine extension   (+) Chronic midline low back pain without sciatica      NEURO/PSYCH   (+) Chronic midline low back pain without sciatica      PULMONARY (within normal limits)        Physical Exam    Airway    Mallampati score: III  TM Distance: >3 FB  Neck ROM: full     Dental   No notable dental hx     Cardiovascular  Rhythm: regular, Rate: normal, Cardiovascular exam normal    Pulmonary  Pulmonary exam normal Breath sounds clear to auscultation    Other Findings  post-pubertal.      Anesthesia Plan  ASA Score- 2     Anesthesia Type- general with ASA Monitors.         Additional Monitors:     Airway Plan: ETT.    Comment: Aman Vasquez is a 75 y.o. male with PMHx significant for prostate cancer, cervical spine ROM limitation following trauma to c-spine in past presenting today for cystoscopy, possible TURBT w/ Dr. Falcon.     Plan: GETA, PIVx1-2, standard ASA monitors. Standard PONV ppx.     Anesthesia plan and consent discussed with patient who expressed understanding and agreement. Risks/benefits and alternatives discussed with patient including possible PONV, sore throat, damage to teeth/lips/gums and possibility of rare anesthetic and surgical emergencies.     RCRI (non-cardiac surgery)  High-risk surgery (intraperitoneal, intrathoracic, suprainguinal vascular surgery): 0  History of ischemic heart disease: 0  History of congestive heart failure: 0  History of  cerebrovascular disease: 0  Pre-operative treatment with insulin: 0  Pre-operative creatinine >2.0 mg/dL: 0    Score: 0    0 = < 1%   1 = 1.3% or less (low risk)  2 = 4%-7% (intermediate risk)  >/=3 = 9%-11% (high risk)        Jeff HERNANDEZ D.O., have personally seen and evaluated the patient prior to anesthetic care. I have reviewed the pre-anesthetic record, and other medical records if appropriate to the anesthetic care. If a CRNA is involved in the case, I have reviewed the CRNA assessment, if present, and agree.          .       Plan Factors-Exercise tolerance (METS): >4 METS.    Chart reviewed. EKG reviewed. Imaging results reviewed. Existing labs reviewed. Patient summary reviewed.    Patient is not a current smoker.  Patient instructed to abstain from smoking on day of procedure. Patient did not smoke on day of surgery.    Obstructive sleep apnea risk education given perioperatively.        Induction- intravenous.    Postoperative Plan- Plan for postoperative opioid use. Planned trial extubation    Perioperative Resuscitation Plan - Level 1 - Full Code.       Informed Consent- Anesthetic plan and risks discussed with patient.  I personally reviewed this patient with the CRNA. Discussed and agreed on the Anesthesia Plan with the CRNA..      NPO Status:  Vitals Value Taken Time   Date of last liquid 05/07/25 05/08/25 0947   Time of last liquid 2000 05/08/25 0947   Date of last solid 05/07/25 05/08/25 0947   Time of last solid 2000 05/08/25 0947

## 2025-05-08 NOTE — OP NOTE
OPERATIVE REPORT  PATIENT NAME: Aman Vasquez    :  1950  MRN: [id number]  Pt Location: [facility] OR ROOM 03    SURGERY DATE: 2025    Surgeons and Role:     * Memo Falcon MD - Primary    Preop Diagnosis:  Malignant neoplasm of overlapping sites of bladder (HCC) [C67.8]    Post-Op Diagnosis Codes:     * Malignant neoplasm of overlapping sites of bladder (HCC) [C67.8]    Procedure(s):  Transurethral resection of bladder tumor    Specimen(s):  ID Type Source Tests Collected by Time Destination   1 : Right lateral wall Tissue Urinary Bladder TISSUE EXAM Memo Falcon MD 2025 1303    2 : Right dome Tissue Urinary Bladder TISSUE EXAM Memo Falcon MD 2025 1307        Estimated Blood Loss:   Minimal    Drains:  Urethral Catheter Latex 20 Fr. (Active)   Number of days: 0       Anesthesia Type:   General    Operative Indications:  Malignant neoplasm of overlapping sites of bladder (HCC) [C67.8]    Operative Findings:  Two areas of sessile erythema along right lateral wall and a right dome of bladder      Complications:   None    Procedure and Technique:      PROCEDURE SUMMARY:    The patient was brought to the operating room and anesthesia obtained.  The patient was then placed in the lithotomy position and prepped and draped using standard sterile technique.  All pressure points were carefully padded.  A surgical time-out occurred, antibiotics were administered, and thromboembolism prophylaxis was given.  A 27 F saline resectoscope was inserted into the urethra after dilation of the urethral meatus to 28 F using standard urethral sounds. The urethra and bladder were carefully inspected.     Cystoscopy findings:    Urethra:  Normal  Prostate:  Normal  Bladder:  Lesion identified, characteristics below  Ureteral orifices: Normal    Urachus:   Normal    Lesion characteristics:  -Appearance:    Flat, erythematous  -Number of foci:   2  -Aggregate tumor diameter:  2 cm  -Largest individual tumor:  1.5  cm  -Location:    Right lateral wall and right dome of bladder  -Clinical stage:   Ta, CIS    Bladder tumor resection:    Using a 26-Montserratian Olympus continuous flow resectoscope, all abnormal lesions noted above were resected and sent for pathology.  Resection craters and surrounding urothelium was electrofulgurated and hemostasis was achieved.  Each section was submitted for pathology as documented above.  The bladder was then observed multiple cycles of filling and emptying hemostasis was deemed to be excellent prior to terminating the procedure.    A 20-Montserratian Molina catheter was placed at the end of the case.  Will plan for Molina removal tomorrow.        Patient Disposition:  PACU       SIGNATURE: Memo Falcon MD  DATE: May 8, 2025  TIME: 1:13 PM

## 2025-05-08 NOTE — H&P
H&P - Urology   Name: Aman Vasquez 75 y.o. male I MRN: [id number]  Unit/Bed#: OR POOL I Date of Admission: 5/8/2025   Date of Service: 5/8/2025 I Hospital Day: 0     Assessment & Plan   This is a 75 y.o. year old male here for cystoscopy, bladder biopsy, possible transurethral resection of bladder, and he is stable and optimized for his procedure.    History of Present Illness    Aman Vasquez is a 75 y.o. year old male who presents for for cystoscopy, bladder biopsy, possible transurethral resection of bladder.  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    Historical Information   Past Medical History:   Diagnosis Date    Cancer (HCC)     Chronic pain disorder     lower back    Colon polyp     Fatty liver     Hemorrhoids     History of transfusion 2014    Kidney stone     on PET scan    Prostate cancer (HCC)      Past Surgical History:   Procedure Laterality Date    COLONOSCOPY      HEMORROIDECTOMY      ID CYSTO W/REMOVAL OF LESIONS SMALL N/A 1/10/2025    Procedure: (TURBT);  Surgeon: Memo Falcon MD;  Location: [facility] OR;  Service: Urology     Social History     Tobacco Use    Smoking status: Never     Passive exposure: Never    Smokeless tobacco: Never   Vaping Use    Vaping status: Never Used   Substance and Sexual Activity    Alcohol use: Not Currently     Comment: has not had any alcohol in over 2 months    Drug use: Never    Sexual activity: Not on file     E-Cigarette/Vaping    E-Cigarette Use Never User      E-Cigarette/Vaping Substances    Nicotine No     THC No     CBD No     Flavoring No     Other No     Unknown No      Family History   Problem Relation Age of Onset    Cancer Mother     Bone cancer Mother     Lung cancer Father     Breast cancer Daughter        Meds/Allergies     Current  Facility-Administered Medications:     ceFAZolin (ANCEF) IVPB (premix in dextrose) 2,000 mg 50 mL, 2,000 mg, Intravenous, Once    lactated ringers infusion, 125 mL/hr, Intravenous, Continuous, 125 mL/hr at 05/08/25 1000  No Known Allergies    Objective :  Temp:  [97.6 °F (36.4 °C)] 97.6 °F (36.4 °C)  HR:  [70] 70  BP: (134)/(73) 134/73  Resp:  [17] 17  SpO2:  [96 %] 96 %  O2 Device: None (Room air)    Physical Exam  Vitals and nursing note reviewed.   Constitutional:       General: He is not in acute distress.     Appearance: He is well-developed.   HENT:      Head: Normocephalic and atraumatic.   Eyes:      Conjunctiva/sclera: Conjunctivae normal.   Cardiovascular:      Rate and Rhythm: Normal rate and regular rhythm.      Heart sounds: No murmur heard.  Pulmonary:      Effort: Pulmonary effort is normal. No respiratory distress.      Breath sounds: Normal breath sounds.   Abdominal:      Palpations: Abdomen is soft.      Tenderness: There is no abdominal tenderness.   Musculoskeletal:         General: No swelling.      Cervical back: Neck supple.   Skin:     General: Skin is warm and dry.      Capillary Refill: Capillary refill takes less than 2 seconds.   Neurological:      Mental Status: He is alert.   Psychiatric:         Mood and Affect: Mood normal.       Gen: NAD  Head: NCAT  CV: RRR  CHEST: Clear  ABD: soft, NT/ND  EXT: no edema

## 2025-05-08 NOTE — DISCHARGE INSTR - AVS FIRST PAGE
Aman, everything went well today.  I resected two small spots in the bladder where there was some redness given that you have a history of carcinoma in situ.  No big papillary tumors were seen.  Can have Molina removed tomorrow morning or Monday morning.

## 2025-05-09 ENCOUNTER — PROCEDURE VISIT (OUTPATIENT)
Dept: UROLOGY | Facility: MEDICAL CENTER | Age: 75
End: 2025-05-09
Payer: COMMERCIAL

## 2025-05-09 VITALS
DIASTOLIC BLOOD PRESSURE: 68 MMHG | SYSTOLIC BLOOD PRESSURE: 126 MMHG | BODY MASS INDEX: 29.28 KG/M2 | HEIGHT: 77 IN | WEIGHT: 248 LBS

## 2025-05-09 DIAGNOSIS — N32.89 BLADDER MASS: Primary | ICD-10-CM

## 2025-05-09 LAB — POST-VOID RESIDUAL VOLUME, ML POC: 10 ML

## 2025-05-09 PROCEDURE — 99024 POSTOP FOLLOW-UP VISIT: CPT

## 2025-05-09 PROCEDURE — 51798 US URINE CAPACITY MEASURE: CPT

## 2025-05-09 NOTE — PROGRESS NOTES
"5/9/2025    Aman Vasquez  1950  [id number]    Diagnosis  Chief Complaint    Post-op         Patient presents for champagne removal/TOV s/p TURBT 5/8/2025 managed by Dr. Falcon    Plan  Return to the office this afternoon for TOV  Return to the office for PO appointment 5/22/2025    Procedure Champagne removal/voiding trial    Champagne catheter removed after deflation of an intact balloon. Patient tolerated well. Encouraged patient to hydrate well and return this afternoon for post void residual. He knows he may return early if uncomfortable and unable to urinate. Patient agrees to this plan.    Patient returned this afternoon. Patient states able to void. Patient voided prior to visit. Bladder ultrasound performed and PVR measured 10 ml.    Pt stated that he is doing well since surgery yesterday. Stated that he is having no pain and had a bowel movement in the AM. Pt was appreciative to have an appointment today to be able to get the catheter out. Advised pt if any questions or concerns to give our office a call.     Vitals:    05/09/25 0849   BP: 126/68   BP Location: Left arm   Patient Position: Sitting   Cuff Size: Adult   Weight: 112 kg (248 lb)   Height: 6' 5\" (1.956 m)           Diane Graff RN       "

## 2025-05-13 PROCEDURE — 88342 IMHCHEM/IMCYTCHM 1ST ANTB: CPT | Performed by: PATHOLOGY

## 2025-05-13 PROCEDURE — 88307 TISSUE EXAM BY PATHOLOGIST: CPT | Performed by: PATHOLOGY

## 2025-05-13 PROCEDURE — 88341 IMHCHEM/IMCYTCHM EA ADD ANTB: CPT | Performed by: PATHOLOGY

## 2025-05-16 DIAGNOSIS — C61 PRIMARY PROSTATE ADENOCARCINOMA (HCC): ICD-10-CM

## 2025-05-16 RX ORDER — RELUGOLIX 120 MG/1
2 TABLET, FILM COATED ORAL DAILY
Qty: 180 TABLET | Refills: 0 | Status: SHIPPED | OUTPATIENT
Start: 2025-05-16

## 2025-05-16 NOTE — TELEPHONE ENCOUNTER
Patient informed there are refills remaining on current prescription. Patient states there are no refills at the pharmacy and is requesting prescription to be refilled.   Reason for call:   [x] Refill   [] Prior Auth  [] Other:     Office:   [] PCP/Provider -   [x] Specialty/Provider - [facility]  Authorized By: Cristofer Llamas MD      Medication: Relugolix (Orgovyx) 120 MG TABS    Dose/Frequency: Take 2 tablets by mouth daily (240 mg total)    Quantity: 180    Pharmacy: [place] [place]     Local Pharmacy   Does the patient have enough for 3 days?   [] Yes   [] No - Send as HP to POD    Mail Away Pharmacy   Does the patient have enough for 10 days?   [] Yes   [x] No - Send as HP to POD

## 2025-05-22 ENCOUNTER — OFFICE VISIT (OUTPATIENT)
Dept: UROLOGY | Facility: MEDICAL CENTER | Age: 75
End: 2025-05-22
Payer: COMMERCIAL

## 2025-05-22 VITALS
HEIGHT: 77 IN | WEIGHT: 250 LBS | OXYGEN SATURATION: 96 % | DIASTOLIC BLOOD PRESSURE: 88 MMHG | HEART RATE: 64 BPM | SYSTOLIC BLOOD PRESSURE: 144 MMHG | BODY MASS INDEX: 29.52 KG/M2

## 2025-05-22 DIAGNOSIS — C61 PROSTATE CANCER (HCC): ICD-10-CM

## 2025-05-22 DIAGNOSIS — C67.8 MALIGNANT NEOPLASM OF OVERLAPPING SITES OF BLADDER (HCC): Primary | ICD-10-CM

## 2025-05-22 DIAGNOSIS — R39.15 URINARY URGENCY: ICD-10-CM

## 2025-05-22 LAB — POST-VOID RESIDUAL VOLUME, ML POC: 55 ML

## 2025-05-22 PROCEDURE — 87086 URINE CULTURE/COLONY COUNT: CPT | Performed by: UROLOGY

## 2025-05-22 PROCEDURE — 51798 US URINE CAPACITY MEASURE: CPT | Performed by: UROLOGY

## 2025-05-22 PROCEDURE — 99214 OFFICE O/P EST MOD 30 MIN: CPT | Performed by: UROLOGY

## 2025-05-22 NOTE — PROGRESS NOTES
Name: Aman Vasquez      : 1950      MRN: [id number]  Encounter Provider: Memo Falcon MD  Encounter Date: 2025   [facility]  :  Assessment & Plan  Malignant neoplasm of overlapping sites of bladder (HCC)  HGTa with CIS and inverted growth pattern (cannot exclude focal superficial T1 disease) with negative muscle in specimen   Completed induction BCG x 6   Post BCG biopsy was negative for any definitive urothelial carcinoma  - discussed moving forward with maintenance gemcitabine/docetaxel monthly x 2 years  - consent for intravesical chemotherapy signed in the office  - will plan for surveillance cystoscopy in 3 months    Orders:    POCT Measure PVR    Prostate cancer (HCC)  PSA up to 78 on 2025  GG 4, PSA 32, probable ISH on MRI, 1 cm right external iliac lymph node  PSMA PET scan showed taisha disease in lower abdomen and right pelvis, no bone metastasis  Most recent PSA down to 0.17 (2025)  - on combination ADT with oncology: apalutamide and relugolix   - seen by radiation oncology as well, but declined EBRT in preference of ADT alone    Urinary urgency  - urine culture sent  - if no improvements in urinary symptoms and urine culture negative can consider cystoscopy earlier than 3 months due to voiding concerns    Orders:    Urine culture        History of Present Illness   Aman Vasquez is a 75 y.o. male who presents for follow up    Bladder biopsy showed denuded cells and inflammation, no clear evidence of residual urothelial carcinoma    Does report increased urgency, frequency since time of procedure      Review of Systems   All other systems reviewed and are negative.    Past Medical History   Past Medical History[1]  Past Surgical History[2]  Family History[3]   reports that he has never smoked. He has never been exposed to tobacco smoke. He has never used smokeless tobacco. He reports that he does not currently use alcohol. He  "reports that he does not use drugs.  Current Outpatient Medications   Medication Instructions    Apalutamide (Erleada) 60 MG TABS 4 tablets, Oral, Daily, (240 mg total)    phenazopyridine (PYRIDIUM) 200 mg, Oral, 3 times daily with meals    Relugolix (Orgovyx) 120 MG TABS 2 tablets, Oral, Daily, (240 mg total)    triamcinolone (KENALOG) 0.1 % ointment Topical, 2 times daily, Apply two times a week    vitamin B-1 250 mg, Daily   Allergies[4]      Objective   /88 (BP Location: Left arm, Patient Position: Sitting, Cuff Size: Adult)   Pulse 64   Ht 6' 5\" (1.956 m)   Wt 113 kg (250 lb) Comment: Verbal  SpO2 96%   BMI 29.65 kg/m²     Physical Exam  Vitals and nursing note reviewed.   Constitutional:       General: He is not in acute distress.     Appearance: He is well-developed.   HENT:      Head: Normocephalic and atraumatic.     Eyes:      Conjunctiva/sclera: Conjunctivae normal.       Cardiovascular:      Rate and Rhythm: Normal rate and regular rhythm.      Heart sounds: No murmur heard.  Pulmonary:      Effort: Pulmonary effort is normal. No respiratory distress.      Breath sounds: Normal breath sounds.   Abdominal:      Palpations: Abdomen is soft.      Tenderness: There is no abdominal tenderness.     Musculoskeletal:         General: No swelling.      Cervical back: Neck supple.     Skin:     General: Skin is warm and dry.      Capillary Refill: Capillary refill takes less than 2 seconds.     Neurological:      General: No focal deficit present.      Mental Status: He is alert and oriented to person, place, and time.     Psychiatric:         Mood and Affect: Mood normal.          Results   Lab Results   Component Value Date    PSA 0.170 04/30/2025    PSA 0.594 03/24/2025    PSA 78.583 (H) 01/28/2025     Lab Results   Component Value Date    CALCIUM 8.7 04/30/2025    K 4.1 04/30/2025    CO2 23 04/30/2025     (H) 04/30/2025    BUN 21 04/30/2025    CREATININE 1.00 04/30/2025     Lab Results "   Component Value Date    WBC 5.33 04/30/2025    HGB 12.6 04/30/2025    HCT 38.8 04/30/2025    MCV 96 04/30/2025     (L) 04/30/2025       Office Urine Dip  Recent Results (from the past hour)   POCT Measure PVR    Collection Time: 05/22/25  3:26 PM   Result Value Ref Range    POST-VOID RESIDUAL VOLUME, ML POC 55 mL              [1]   Past Medical History:  Diagnosis Date    Cancer (HCC)     Chronic pain disorder     lower back    Colon polyp     Fatty liver     Hemorrhoids     History of transfusion 2014    Kidney stone     on PET scan    Prostate cancer (HCC)    [2]   Past Surgical History:  Procedure Laterality Date    COLONOSCOPY      HEMORROIDECTOMY      OK CYSTO W/REMOVAL OF LESIONS SMALL N/A 1/10/2025    Procedure: (TURBT);  Surgeon: Memo Falcon MD;  Location: [facility];  Service: Urology    OK CYSTOURETHROSCOPY WITH BIOPSY N/A 5/8/2025    Procedure: TURBT;  Surgeon: Memo Falcon MD;  Location: [facility];  Service: Urology   [3]   Family History  Problem Relation Name Age of Onset    Cancer Mother      Bone cancer Mother      Lung cancer Father      Breast cancer Daughter     [4] No Known Allergies

## 2025-05-24 LAB — BACTERIA UR CULT: NORMAL

## 2025-05-27 DIAGNOSIS — C61 PRIMARY PROSTATE ADENOCARCINOMA (HCC): ICD-10-CM

## 2025-05-27 RX ORDER — RELUGOLIX 120 MG/1
2 TABLET, FILM COATED ORAL DAILY
Qty: 180 TABLET | Refills: 0 | Status: SHIPPED | OUTPATIENT
Start: 2025-05-27

## 2025-05-28 ENCOUNTER — TELEPHONE (OUTPATIENT)
Age: 75
End: 2025-05-28

## 2025-05-28 ENCOUNTER — DOCUMENTATION (OUTPATIENT)
Dept: HEMATOLOGY ONCOLOGY | Facility: CLINIC | Age: 75
End: 2025-05-28

## 2025-05-28 NOTE — PROGRESS NOTES
Spoke with [facility], [facility]govyx requires [facility]. They are working on that piece.

## 2025-05-28 NOTE — TELEPHONE ENCOUNTER
Attempted to contact [facility] at [facility].  Left detailed message including instructions to give us information that he is asking for if unable to speak with someone.  Left call back number.

## 2025-05-28 NOTE — TELEPHONE ENCOUNTER
Deonte, calling from [facility] to speak with Dr. Llamas regarding medication Orgovyx.  He needs to verify some information about patient  please call [phone] ext [phone]

## 2025-05-28 NOTE — TELEPHONE ENCOUNTER
Deonte from [facility] missed call from [facility].    Need to know:    Why picked orgovyx in January instead of Leuprolide?    2. Was it for fast/quicker castration?    Please call [phone] ext [phone]

## 2025-05-30 ENCOUNTER — DOCUMENTATION (OUTPATIENT)
Dept: HEMATOLOGY ONCOLOGY | Facility: CLINIC | Age: 75
End: 2025-05-30

## 2025-06-11 DIAGNOSIS — C67.9 UROTHELIAL CARCINOMA OF BLADDER WITHOUT INVASION OF MUSCLE (HCC): Primary | ICD-10-CM

## 2025-06-12 DIAGNOSIS — C67.9 UROTHELIAL CARCINOMA OF BLADDER WITHOUT INVASION OF MUSCLE (HCC): Primary | ICD-10-CM

## 2025-06-12 NOTE — PROGRESS NOTES
[facility] orders updated at request of pharmacist hussain    there is a hard stop for monthly treatments requiring either day number of the month, or first/second/third/fourth specifid day sun/mon/tues/etc of the month    the default notes first monday of the month at least 28 days apart    even in custom option it requires a day number, or specific day of the week    for now i have updated it to 1st monday at least 28 days apart- but know it does not have tp specifically be a monday    his first session will be 6/16 with the next estimated session on 7/14

## 2025-06-13 ENCOUNTER — TELEPHONE (OUTPATIENT)
Dept: INFUSION CENTER | Facility: CLINIC | Age: 75
End: 2025-06-13

## 2025-06-13 NOTE — TELEPHONE ENCOUNTER
Called patient to confirm appointment on 6/16/25 at 12:00 for bladder chemotherapy. Discussed the location, projected, length of appointment, visitor policy, and availability of snacks, drinks, and WiFi. Encouraged patient to restrict fluid intake the morning of his appointment. Patient stated that he did not want chemotherapy. It was explained to patient that this chemotherapy was targeted to the bladder. Patient stated that he would call the ordering urologist's office to discuss.

## 2025-06-13 NOTE — TELEPHONE ENCOUNTER
Called pt and spoke with him. Explained to him the infusions and the procedure. Advised him this is similar to his BCG treatments that he had in the office.   Pt was relieved and will proceed with infusions as scheduled.

## 2025-06-16 ENCOUNTER — HOSPITAL ENCOUNTER (OUTPATIENT)
Dept: INFUSION CENTER | Facility: CLINIC | Age: 75
Discharge: HOME | End: 2025-06-16
Attending: UROLOGY
Payer: COMMERCIAL

## 2025-06-16 VITALS
SYSTOLIC BLOOD PRESSURE: 122 MMHG | RESPIRATION RATE: 18 BRPM | HEART RATE: 67 BPM | DIASTOLIC BLOOD PRESSURE: 75 MMHG | TEMPERATURE: 96.4 F

## 2025-06-16 DIAGNOSIS — C67.9 UROTHELIAL CARCINOMA OF BLADDER WITHOUT INVASION OF MUSCLE (HCC): Primary | ICD-10-CM

## 2025-06-16 PROCEDURE — 51720 TREATMENT OF BLADDER LESION: CPT

## 2025-06-16 RX ADMIN — GEMCITABINE 1000 MG: 38 INJECTION, SOLUTION INTRAVENOUS at 12:30

## 2025-06-16 RX ADMIN — DOCETAXEL 37.5 MG: 20 INJECTION, SOLUTION, CONCENTRATE INTRAVENOUS at 14:02

## 2025-06-16 NOTE — PLAN OF CARE
Problem: Potential for Falls  Goal: Patient will remain free of falls  Description: INTERVENTIONS:  - Educate patient/family on patient safety including physical limitations  - Instruct patient to call for assistance with activity   - Consider consulting OT/PT to assist with strengthening/mobility based on AM PAC & JH-HLM score  - Consult OT/PT to assist with strengthening/mobility   - Keep Call bell within reach  - Keep bed low and locked with side rails adjusted as appropriate  - Keep care items and personal belongings within reach  - Initiate and maintain comfort rounds  - Make Fall Risk Sign visible to staff  - - Apply yellow socks and bracelet for high fall risk patients  - Consider moving patient to room near nurses station  Outcome: Progressing

## 2025-06-16 NOTE — PROGRESS NOTES
Pt arrived to first bladder chemotherapy treatment. Pt tolerated procedure well without incident. Pt instructed to retain docetaxel for 2 hours, turning every 15 minutes, per orders,  once home. Pt verbalized understanding. AVS declined, pt aware of next appt in one month 7/14 [id number]

## 2025-06-25 ENCOUNTER — OFFICE VISIT (OUTPATIENT)
Dept: FAMILY MEDICINE CLINIC | Facility: CLINIC | Age: 75
End: 2025-06-25
Payer: COMMERCIAL

## 2025-06-25 VITALS
HEART RATE: 94 BPM | BODY MASS INDEX: 29.76 KG/M2 | OXYGEN SATURATION: 97 % | RESPIRATION RATE: 16 BRPM | TEMPERATURE: 98.3 F | DIASTOLIC BLOOD PRESSURE: 74 MMHG | SYSTOLIC BLOOD PRESSURE: 128 MMHG | HEIGHT: 77 IN | WEIGHT: 252 LBS

## 2025-06-25 DIAGNOSIS — R10.9 FLANK PAIN: Primary | ICD-10-CM

## 2025-06-25 DIAGNOSIS — C79.82 METASTATIC MALIGNANT NEOPLASM TO PROSTATE (HCC): ICD-10-CM

## 2025-06-25 LAB
BACTERIA UR QL AUTO: ABNORMAL /HPF
BILIRUB UR QL STRIP: NEGATIVE
CLARITY UR: CLEAR
COLOR UR: YELLOW
GLUCOSE UR STRIP-MCNC: NEGATIVE MG/DL
HGB UR QL STRIP.AUTO: ABNORMAL
HYALINE CASTS #/AREA URNS LPF: ABNORMAL /LPF
KETONES UR STRIP-MCNC: NEGATIVE MG/DL
LEUKOCYTE ESTERASE UR QL STRIP: ABNORMAL
MUCOUS THREADS UR QL AUTO: ABNORMAL
NITRITE UR QL STRIP: NEGATIVE
NON-SQ EPI CELLS URNS QL MICRO: ABNORMAL /HPF
PH UR STRIP.AUTO: 5.5 [PH]
PROT UR STRIP-MCNC: ABNORMAL MG/DL
RBC #/AREA URNS AUTO: ABNORMAL /HPF
SL AMB  POCT GLUCOSE, UA: ABNORMAL
SL AMB LEUKOCYTE ESTERASE,UA: ABNORMAL
SL AMB POCT BILIRUBIN,UA: ABNORMAL
SL AMB POCT BLOOD,UA: ABNORMAL
SL AMB POCT CLARITY,UA: ABNORMAL
SL AMB POCT COLOR,UA: YELLOW
SL AMB POCT KETONES,UA: ABNORMAL
SL AMB POCT NITRITE,UA: ABNORMAL
SL AMB POCT PH,UA: 5
SL AMB POCT SPECIFIC GRAVITY,UA: 1.02
SL AMB POCT URINE PROTEIN: ABNORMAL
SL AMB POCT UROBILINOGEN: 0.2
SP GR UR STRIP.AUTO: 1.02 (ref 1–1.03)
UROBILINOGEN UR STRIP-ACNC: <2 MG/DL
WBC #/AREA URNS AUTO: ABNORMAL /HPF

## 2025-06-25 PROCEDURE — G2211 COMPLEX E/M VISIT ADD ON: HCPCS | Performed by: FAMILY MEDICINE

## 2025-06-25 PROCEDURE — 81001 URINALYSIS AUTO W/SCOPE: CPT | Performed by: FAMILY MEDICINE

## 2025-06-25 PROCEDURE — 99213 OFFICE O/P EST LOW 20 MIN: CPT | Performed by: FAMILY MEDICINE

## 2025-06-25 PROCEDURE — 87086 URINE CULTURE/COLONY COUNT: CPT | Performed by: FAMILY MEDICINE

## 2025-06-25 PROCEDURE — 81002 URINALYSIS NONAUTO W/O SCOPE: CPT | Performed by: FAMILY MEDICINE

## 2025-06-26 PROBLEM — R10.9 FLANK PAIN: Status: ACTIVE | Noted: 2025-06-26

## 2025-06-26 PROBLEM — C79.82 METASTATIC MALIGNANT NEOPLASM TO PROSTATE (HCC): Status: ACTIVE | Noted: 2025-06-26

## 2025-06-26 LAB — BACTERIA UR CULT: NORMAL

## 2025-06-26 NOTE — PROGRESS NOTES
Name: Aman Vasquez      : 1950      MRN: [id number]  Encounter Provider: Dai Meza MD  Encounter Date: 2025   Encounter department: [facility]  :  Assessment & Plan  Flank pain  It was discussed with patient to take Tylenol for pain and to stretches.  Encourage oral hydration.  I am going to send the urine for UA and C&S.    Orders:  •  POCT urine dip  •  UA (URINE) with reflex to Scope; Future  •  Urine culture; Future  •  Urine Microscopic    Metastatic malignant neoplasm to prostate (HCC)                History of Present Illness   Is here today with complaint of left lower back pain has been going on for more than a week now on and off worse with movement.  Denies any fever or chills.  Denies any recent history of injury or trauma.  Denies any urinary symptoms other than noticing that his urine has been cloudy.  He stated he had history before of kidney stone about 30 years ago and it was very painful.    Back Pain  Pertinent negatives include no abdominal pain, chest pain, dysuria, fever or headaches.     Review of Systems   Constitutional:  Negative for chills and fever.   HENT:  Negative for trouble swallowing.    Eyes:  Negative for visual disturbance.   Respiratory:  Negative for cough and shortness of breath.    Cardiovascular:  Negative for chest pain and palpitations.   Gastrointestinal:  Negative for abdominal pain, blood in stool and vomiting.   Endocrine: Negative for cold intolerance and heat intolerance.   Genitourinary:  Negative for difficulty urinating and dysuria.   Musculoskeletal:  Positive for back pain. Negative for gait problem.   Skin:  Negative for rash.   Neurological:  Negative for dizziness, syncope and headaches.   Hematological:  Negative for adenopathy.   Psychiatric/Behavioral:  Negative for behavioral problems.        Objective   /74 (BP Location: Left arm, Patient Position: Sitting, Cuff Size: Large)   Pulse 94   Temp 98.3 °F  "(36.8 °C) (Tympanic)   Resp 16   Ht 6' 5\" (1.956 m)   Wt 114 kg (252 lb)   SpO2 97%   BMI 29.88 kg/m²      Physical Exam  Vitals and nursing note reviewed.   Constitutional:       Appearance: He is well-developed.   HENT:      Head: Normocephalic and atraumatic.     Eyes:      Pupils: Pupils are equal, round, and reactive to light.       Cardiovascular:      Rate and Rhythm: Normal rate and regular rhythm.      Heart sounds: Normal heart sounds.   Pulmonary:      Effort: Pulmonary effort is normal.      Breath sounds: Normal breath sounds.   Abdominal:      General: Bowel sounds are normal.      Palpations: Abdomen is soft.     Musculoskeletal:      Cervical back: Normal range of motion and neck supple.   Lymphadenopathy:      Cervical: No cervical adenopathy.     Skin:     General: Skin is warm.      Findings: No rash.     Neurological:      Mental Status: He is alert and oriented to person, place, and time.         "

## 2025-06-26 NOTE — ASSESSMENT & PLAN NOTE
It was discussed with patient to take Tylenol for pain and to stretches.  Encourage oral hydration.  I am going to send the urine for UA and C&S.    Orders:  •  POCT urine dip  •  UA (URINE) with reflex to Scope; Future  •  Urine culture; Future  •  Urine Microscopic

## 2025-06-30 ENCOUNTER — HOSPITAL ENCOUNTER (EMERGENCY)
Facility: HOSPITAL | Age: 75
Discharge: HOME | End: 2025-06-30
Attending: EMERGENCY MEDICINE | Admitting: EMERGENCY MEDICINE
Payer: COMMERCIAL

## 2025-06-30 ENCOUNTER — TELEPHONE (OUTPATIENT)
Age: 75
End: 2025-06-30

## 2025-06-30 ENCOUNTER — APPOINTMENT (EMERGENCY)
Dept: CT IMAGING | Facility: HOSPITAL | Age: 75
End: 2025-06-30
Payer: COMMERCIAL

## 2025-06-30 VITALS
DIASTOLIC BLOOD PRESSURE: 95 MMHG | TEMPERATURE: 98.2 F | OXYGEN SATURATION: 97 % | RESPIRATION RATE: 20 BRPM | HEART RATE: 68 BPM | SYSTOLIC BLOOD PRESSURE: 155 MMHG

## 2025-06-30 DIAGNOSIS — R10.9 FLANK PAIN: Primary | ICD-10-CM

## 2025-06-30 DIAGNOSIS — N30.90 CYSTITIS: ICD-10-CM

## 2025-06-30 LAB
ALBUMIN SERPL BCG-MCNC: 3.9 G/DL (ref 3.5–5)
ALP SERPL-CCNC: 75 U/L (ref 34–104)
ALT SERPL W P-5'-P-CCNC: 11 U/L (ref 7–52)
ANION GAP SERPL CALCULATED.3IONS-SCNC: 6 MMOL/L (ref 4–13)
AST SERPL W P-5'-P-CCNC: 13 U/L (ref 13–39)
BACTERIA UR QL AUTO: ABNORMAL /HPF
BASOPHILS # BLD AUTO: 0.01 THOUSANDS/ÂΜL (ref 0–0.1)
BASOPHILS NFR BLD AUTO: 0 % (ref 0–1)
BILIRUB SERPL-MCNC: 0.46 MG/DL (ref 0.2–1)
BILIRUB UR QL STRIP: NEGATIVE
BUN SERPL-MCNC: 21 MG/DL (ref 5–25)
CALCIUM SERPL-MCNC: 9.1 MG/DL (ref 8.4–10.2)
CHLORIDE SERPL-SCNC: 107 MMOL/L (ref 96–108)
CLARITY UR: CLEAR
CO2 SERPL-SCNC: 25 MMOL/L (ref 21–32)
COLOR UR: ABNORMAL
CREAT SERPL-MCNC: 0.79 MG/DL (ref 0.6–1.3)
EOSINOPHIL # BLD AUTO: 0.1 THOUSAND/ÂΜL (ref 0–0.61)
EOSINOPHIL NFR BLD AUTO: 2 % (ref 0–6)
ERYTHROCYTE [DISTWIDTH] IN BLOOD BY AUTOMATED COUNT: 13.7 % (ref 11.6–15.1)
GFR SERPL CREATININE-BSD FRML MDRD: 86 ML/MIN/1.73SQ M
GLUCOSE SERPL-MCNC: 95 MG/DL (ref 65–140)
GLUCOSE UR STRIP-MCNC: NEGATIVE MG/DL
HCT VFR BLD AUTO: 37.8 % (ref 36.5–49.3)
HGB BLD-MCNC: 13 G/DL (ref 12–17)
HGB UR QL STRIP.AUTO: ABNORMAL
HYALINE CASTS #/AREA URNS LPF: ABNORMAL /LPF
IMM GRANULOCYTES # BLD AUTO: 0.02 THOUSAND/UL (ref 0–0.2)
IMM GRANULOCYTES NFR BLD AUTO: 0 % (ref 0–2)
KETONES UR STRIP-MCNC: NEGATIVE MG/DL
LEUKOCYTE ESTERASE UR QL STRIP: ABNORMAL
LIPASE SERPL-CCNC: 18 U/L (ref 11–82)
LYMPHOCYTES # BLD AUTO: 1.46 THOUSANDS/ÂΜL (ref 0.6–4.47)
LYMPHOCYTES NFR BLD AUTO: 22 % (ref 14–44)
MAGNESIUM SERPL-MCNC: 1.9 MG/DL (ref 1.9–2.7)
MCH RBC QN AUTO: 31.6 PG (ref 26.8–34.3)
MCHC RBC AUTO-ENTMCNC: 34.4 G/DL (ref 31.4–37.4)
MCV RBC AUTO: 92 FL (ref 82–98)
MONOCYTES # BLD AUTO: 0.42 THOUSAND/ÂΜL (ref 0.17–1.22)
MONOCYTES NFR BLD AUTO: 6 % (ref 4–12)
MUCOUS THREADS UR QL AUTO: ABNORMAL
NEUTROPHILS # BLD AUTO: 4.55 THOUSANDS/ÂΜL (ref 1.85–7.62)
NEUTS SEG NFR BLD AUTO: 70 % (ref 43–75)
NITRITE UR QL STRIP: NEGATIVE
NON-SQ EPI CELLS URNS QL MICRO: ABNORMAL /HPF
NRBC BLD AUTO-RTO: 0 /100 WBCS
PH UR STRIP.AUTO: 5 [PH]
PLATELET # BLD AUTO: 145 THOUSANDS/UL (ref 149–390)
PMV BLD AUTO: 9.1 FL (ref 8.9–12.7)
POTASSIUM SERPL-SCNC: 4.2 MMOL/L (ref 3.5–5.3)
PROT SERPL-MCNC: 6.8 G/DL (ref 6.4–8.4)
PROT UR STRIP-MCNC: NEGATIVE MG/DL
RBC # BLD AUTO: 4.12 MILLION/UL (ref 3.88–5.62)
RBC #/AREA URNS AUTO: ABNORMAL /HPF
SODIUM SERPL-SCNC: 138 MMOL/L (ref 135–147)
SP GR UR STRIP.AUTO: 1.02 (ref 1–1.03)
UROBILINOGEN UR STRIP-ACNC: <2 MG/DL
WBC # BLD AUTO: 6.56 THOUSAND/UL (ref 4.31–10.16)
WBC #/AREA URNS AUTO: ABNORMAL /HPF

## 2025-06-30 PROCEDURE — 83690 ASSAY OF LIPASE: CPT | Performed by: EMERGENCY MEDICINE

## 2025-06-30 PROCEDURE — 99285 EMERGENCY DEPT VISIT HI MDM: CPT | Performed by: EMERGENCY MEDICINE

## 2025-06-30 PROCEDURE — 96374 THER/PROPH/DIAG INJ IV PUSH: CPT

## 2025-06-30 PROCEDURE — 85025 COMPLETE CBC W/AUTO DIFF WBC: CPT | Performed by: EMERGENCY MEDICINE

## 2025-06-30 PROCEDURE — 87086 URINE CULTURE/COLONY COUNT: CPT | Performed by: EMERGENCY MEDICINE

## 2025-06-30 PROCEDURE — 74177 CT ABD & PELVIS W/CONTRAST: CPT

## 2025-06-30 PROCEDURE — 83735 ASSAY OF MAGNESIUM: CPT | Performed by: EMERGENCY MEDICINE

## 2025-06-30 PROCEDURE — 36415 COLL VENOUS BLD VENIPUNCTURE: CPT | Performed by: EMERGENCY MEDICINE

## 2025-06-30 PROCEDURE — 99284 EMERGENCY DEPT VISIT MOD MDM: CPT

## 2025-06-30 PROCEDURE — 81001 URINALYSIS AUTO W/SCOPE: CPT | Performed by: EMERGENCY MEDICINE

## 2025-06-30 PROCEDURE — 80053 COMPREHEN METABOLIC PANEL: CPT | Performed by: EMERGENCY MEDICINE

## 2025-06-30 RX ORDER — PHENAZOPYRIDINE HYDROCHLORIDE 100 MG/1
100 TABLET, FILM COATED ORAL ONCE
Status: COMPLETED | OUTPATIENT
Start: 2025-06-30 | End: 2025-06-30

## 2025-06-30 RX ORDER — MORPHINE SULFATE 4 MG/ML
4 INJECTION, SOLUTION INTRAMUSCULAR; INTRAVENOUS ONCE
Status: COMPLETED | OUTPATIENT
Start: 2025-06-30 | End: 2025-06-30

## 2025-06-30 RX ORDER — PHENAZOPYRIDINE HYDROCHLORIDE 200 MG/1
200 TABLET, FILM COATED ORAL 3 TIMES DAILY PRN
Qty: 6 TABLET | Refills: 0 | Status: SHIPPED | OUTPATIENT
Start: 2025-06-30

## 2025-06-30 RX ORDER — CEPHALEXIN 500 MG/1
500 CAPSULE ORAL EVERY 6 HOURS SCHEDULED
Qty: 28 CAPSULE | Refills: 0 | Status: SHIPPED | OUTPATIENT
Start: 2025-06-30 | End: 2025-07-07

## 2025-06-30 RX ADMIN — MORPHINE SULFATE 4 MG: 4 INJECTION INTRAVENOUS at 11:39

## 2025-06-30 RX ADMIN — CEPHALEXIN 500 MG: 250 CAPSULE ORAL at 15:09

## 2025-06-30 RX ADMIN — IOHEXOL 100 ML: 350 INJECTION, SOLUTION INTRAVENOUS at 12:25

## 2025-06-30 RX ADMIN — PHENAZOPYRIDINE 100 MG: 100 TABLET ORAL at 15:09

## 2025-06-30 NOTE — DISCHARGE INSTRUCTIONS
Please take medications as prescribed.  Please follow-up with your urologist.  Return to Emergency Department if your symptoms change or worsen

## 2025-06-30 NOTE — TELEPHONE ENCOUNTER
FYI    Patient called to cancel his appointment for tomorrow as he is currently in the Emergency Room and patient wanted to notify his pcp.

## 2025-06-30 NOTE — ED PROVIDER NOTES
Time reflects when diagnosis was documented in both MDM as applicable and the Disposition within this note       Time User Action Codes Description Comment    6/30/2025  3:03 PM Syed Akers Add [R10.9] Flank pain     6/30/2025  3:03 PM Syed Akers Add [N30.90] Cystitis           ED Disposition       ED Disposition   Discharge    Condition   Stable    Date/Time   Mon Jun 30, 2025  3:03 PM    Comment   Aman Vasquez discharge to home/self care.                   Assessment & Plan       Medical Decision Making  Patient seen and evaluated.  Differential includes but not limited to nephrolithiasis, obstruction, splenic injury, electrolyte dysfunction, KATHY., Metastases, fracture, strain, sprain.  Concerning part of the history is history of cancer.  Will evaluate with CT imaging to assess for any bony metastases or fractures.  Will also obtain CT imaging abdomen pelvis to assess for any acute intra-abdominal process.    Amount and/or Complexity of Data Reviewed  External Data Reviewed: labs, radiology and notes.  Labs: ordered. Decision-making details documented in ED Course.  Radiology: ordered. Decision-making details documented in ED Course.    Risk  Prescription drug management.        ED Course as of 06/30/25 1554   Mon Jun 30, 2025   1142 CBC and differential(!)  No anemia or leukocytosis   1152 Comprehensive metabolic panel  No KATHY or electrolyte dysfunction   1152 UA (URINE) with reflex to Scope(!)  Not consistent with UTI   1152 LIPASE: 18  Not consistent with pancreatitis   1234 CT abdomen pelvis with contrast  I independently reviewed CT images.  Cannot appreciate any free air or high-grade bowel obstruction.  Gallbladder does appear on the larger side but do not appreciate any stones.  Bladder wall irregularities noted.  Formal read pending per radiology   1235 CT recon only lumbar spine  I independently reviewed CT lumbar spine.  Do not appreciate any significant fractures or retropulsion.  Formal read  pending per radiology   1345 CT recon only lumbar spine  Agree with chronic findings and no acute process of CT lumbar spine   IMPRESSION:     No acute osseous abnormality of lumbar spine.     Degenerative changes, as detailed above.     1440 Reviewed report - agree with Diffuse wall thickening of the underdistended bladder concerning for cystitis. Correlate with urinalysis.     Bilateral nonobstructing renal calculi as measured above with approximately 9 mm calculus in the left kidney likely layering within a calyceal diverticulum.      Will treat with antibiotics, message sent to urology for review   1449 Spoke with urology who recommends obtaining a urine culture.  Given his symptoms and history of cancer, we will reach out to get him set up with an appointment and consideration of cystoscopy.  Okay with me treating for UTI until culture comes back   1504 Updated patient, is comfortable now.  Understands follow-up, return precautions, self-care at home.  Stable for discharge.       Medications   morphine injection 4 mg (4 mg Intravenous Given 6/30/25 1139)   iohexol (OMNIPAQUE) 350 MG/ML injection (MULTI-DOSE) 100 mL (100 mL Intravenous Given 6/30/25 1225)   cephalexin (KEFLEX) capsule 500 mg (500 mg Oral Given 6/30/25 1509)   phenazopyridine (PYRIDIUM) tablet 100 mg (100 mg Oral Given 6/30/25 1509)       ED Risk Strat Scores                    No data recorded        SBIRT 22yo+      Flowsheet Row Most Recent Value   Initial Alcohol Screen: US AUDIT-C     1. How often do you have a drink containing alcohol? 0 Filed at: 06/30/2025 1133   2. How many drinks containing alcohol do you have on a typical day you are drinking?  0 Filed at: 06/30/2025 1133   3b. FEMALE Any Age, or MALE 65+: How often do you have 4 or more drinks on one occassion? 0 Filed at: 06/30/2025 1133   Audit-C Score 0 Filed at: 06/30/2025 1133   DORI: How many times in the past year have you...    Used an illegal drug or used a prescription  medication for non-medical reasons? Never Filed at: 06/30/2025 1133                            History of Present Illness       Chief Complaint   Patient presents with    Flank Pain     Pt reports left sided flank pain. Hx of kidney stones.        Past Medical History[1]   Past Surgical History[2]   Family History[3]   Social History[4]   E-Cigarette/Vaping    E-Cigarette Use Never User       E-Cigarette/Vaping Substances    Nicotine No     THC No     CBD No     Flavoring No     Other No     Unknown No       I have reviewed and agree with the history as documented.     68-year-old male history of cancer presents for evaluation of worsening left flank/hip/back pain.  This has been ongoing for about a week worsening in his yesterday when he picked up his wife.  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  She is having excruciating pain earlier that prompted presentation.  Denying any saddle anesthesia, bowel or bladder incontinence.  No fevers.  Does have a history of cancer.  Did take Excedrin this morning.  Does also have a history of kidney stones and feels that this is possibly similar      History provided by:  Medical records and patient  Flank Pain  Associated symptoms: no chest pain, no cough, no dysuria, no fever, no hematuria, no nausea, no shortness of breath and no vomiting        Review of Systems   Constitutional:  Negative for fever.   Eyes:  Negative for pain and visual disturbance.   Respiratory:  Negative for cough and shortness of breath.    Cardiovascular:  Negative for chest pain and palpitations.   Gastrointestinal:  Negative for abdominal pain, nausea and vomiting.   Genitourinary:  Positive for flank pain. Negative for decreased urine volume, difficulty urinating, dysuria and hematuria.   Musculoskeletal:  Positive for back pain and gait problem.   Skin:  Negative for color change and rash.   Psychiatric/Behavioral:  Negative for confusion.            Objective       ED Triage  Vitals   Temperature Pulse Blood Pressure Respirations SpO2 Patient Position - Orthostatic VS   06/30/25 1100 06/30/25 1100 06/30/25 1100 06/30/25 1100 06/30/25 1100 06/30/25 1315   98.2 °F (36.8 °C) 70 161/99 20 97 % Lying      Temp Source Heart Rate Source BP Location FiO2 (%) Pain Score    06/30/25 1100 06/30/25 1100 06/30/25 1100 -- 06/30/25 1139    Oral Monitor Right arm  5      Vitals      Date and Time Temp Pulse SpO2 Resp BP Pain Score FACES Pain Rating User   06/30/25 1315 -- 68 -- 20 155/95 -- -- JK   06/30/25 1237 -- -- -- -- -- 3 --    06/30/25 1139 -- -- -- -- -- 5 --    06/30/25 1100 98.2 °F (36.8 °C) 70 97 % 20 161/99 -- -- HMR            Physical Exam  Vitals and nursing note reviewed.   Constitutional:       General: He is not in acute distress.     Appearance: He is well-developed. He is not ill-appearing.   HENT:      Head: Normocephalic and atraumatic.     Eyes:      Conjunctiva/sclera: Conjunctivae normal.       Cardiovascular:      Rate and Rhythm: Normal rate and regular rhythm.      Heart sounds: No murmur heard.  Pulmonary:      Effort: Pulmonary effort is normal. No respiratory distress.      Breath sounds: Normal breath sounds.   Abdominal:      General: Abdomen is protuberant.      Palpations: Abdomen is soft.      Tenderness: There is no abdominal tenderness.     Musculoskeletal:         General: No swelling.      Cervical back: Neck supple.        Back:      Skin:     General: Skin is warm and dry.      Capillary Refill: Capillary refill takes less than 2 seconds.     Neurological:      Mental Status: He is alert.      Sensory: No sensory deficit.      Comments: Sensation intact bilateral lower extremities.  Able to move bilateral feet and lower extremities   Psychiatric:         Mood and Affect: Mood normal.         Behavior: Behavior is cooperative.         Results Reviewed       Procedure Component Value Units Date/Time    Urine culture [[id number]] Collected: 06/30/25 1509    Lab  Status: No result Specimen: Urine, Clean Catch     Urine Microscopic [[id number]]  (Abnormal) Collected: 06/30/25 1138    Lab Status: Final result Specimen: Urine, Clean Catch Updated: 06/30/25 1153     RBC, UA 20-30 /hpf      WBC, UA 30-50 /hpf      Epithelial Cells Occasional /hpf      Bacteria, UA Occasional /hpf      MUCUS THREADS Moderate     Hyaline Casts, UA 0-3 /lpf     UA (URINE) with reflex to Scope [[id number]]  (Abnormal) Collected: 06/30/25 1138    Lab Status: Final result Specimen: Urine, Clean Catch Updated: 06/30/25 1150     Color, UA Light Yellow     Clarity, UA Clear     Specific Gravity, UA 1.025     pH, UA 5.0     Leukocytes, UA Moderate     Nitrite, UA Negative     Protein, UA Negative mg/dl      Glucose, UA Negative mg/dl      Ketones, UA Negative mg/dl      Urobilinogen, UA <2.0 mg/dl      Bilirubin, UA Negative     Occult Blood, UA Small    Comprehensive metabolic panel [[id number]] Collected: 06/30/25 1123    Lab Status: Final result Specimen: Blood from Arm, Left Updated: 06/30/25 1148     Sodium 138 mmol/L      Potassium 4.2 mmol/L      Chloride 107 mmol/L      CO2 25 mmol/L      ANION GAP 6 mmol/L      BUN 21 mg/dL      Creatinine 0.79 mg/dL      Glucose 95 mg/dL      Calcium 9.1 mg/dL      AST 13 U/L      ALT 11 U/L      Alkaline Phosphatase 75 U/L      Total Protein 6.8 g/dL      Albumin 3.9 g/dL      Total Bilirubin 0.46 mg/dL      eGFR 86 ml/min/1.73sq m     Narrative:      National Kid[employer] Disease Foundation guidelines for Chronic Kidney Disease (CKD):     Stage 1 with normal or high GFR (GFR > 90 mL/min/1.73 square meters)    Stage 2 Mild CKD (GFR = 60-89 mL/min/1.73 square meters)    Stage 3A Moderate CKD (GFR = 45-59 mL/min/1.73 square meters)    Stage 3B Moderate CKD (GFR = 30-44 mL/min/1.73 square meters)    Stage 4 Severe CKD (GFR = 15-29 mL/min/1.73 square meters)    Stage 5 End Stage CKD (GFR <15 mL/min/1.73 square meters)  Note: GFR calculation is accurate only with a steady  state creatinine    Magnesium [[id number]]  (Normal) Collected: 06/30/25 1123    Lab Status: Final result Specimen: Blood from Arm, Left Updated: 06/30/25 1148     Magnesium 1.9 mg/dL     Lipase [[id number]]  (Normal) Collected: 06/30/25 1123    Lab Status: Final result Specimen: Blood from Arm, Left Updated: 06/30/25 1148     Lipase 18 u/L     CBC and differential [[id number]]  (Abnormal) Collected: 06/30/25 1123    Lab Status: Final result Specimen: Blood from Arm, Left Updated: 06/30/25 1140     WBC 6.56 Thousand/uL      RBC 4.12 Million/uL      Hemoglobin 13.0 g/dL      Hematocrit 37.8 %      MCV 92 fL      MCH 31.6 pg      MCHC 34.4 g/dL      RDW 13.7 %      MPV 9.1 fL      Platelets 145 Thousands/uL      nRBC 0 /100 WBCs      Segmented % 70 %      Immature Grans % 0 %      Lymphocytes % 22 %      Monocytes % 6 %      Eosinophils Relative 2 %      Basophils Relative 0 %      Absolute Neutrophils 4.55 Thousands/µL      Absolute Immature Grans 0.02 Thousand/uL      Absolute Lymphocytes 1.46 Thousands/µL      Absolute Monocytes 0.42 Thousand/µL      Eosinophils Absolute 0.10 Thousand/µL      Basophils Absolute 0.01 Thousands/µL             CT abdomen pelvis with contrast   Final Interpretation by Dior Angel MD (06/30 1432)      Diffuse wall thickening of the underdistended bladder concerning for cystitis. Correlate with urinalysis.      Bilateral nonobstructing renal calculi as measured above with approximately 9 mm calculus in the left kidney likely layering within a calyceal diverticulum.      The study was marked in EPIC for immediate notification.      Resident: Phong Everett      I, the attending radiologist, have reviewed the images and agree with the final report above.      Workstation performed: [id number]         CT recon only lumbar spine   Final Interpretation by Kapil Luther MD (06/30 1342)      No acute osseous abnormality of lumbar spine.      Degenerative changes, as detailed above.       Please see same-day CT abdomen pelvis with contrast for further evaluation            Workstation performed: [id number]             Procedures    ED Medication and Procedure Management   Prior to Admission Medications   Prescriptions Last Dose Informant Patient Reported? Taking?   Apalutamide (Erleada) 60 MG TABS   No Yes   Sig: Take 4 tablets by mouth daily (240 mg total)   Relugolix (Orgovyx) 120 MG TABS   No Yes   Sig: Take 2 tablets by mouth daily (240 mg total)   Thiamine HCl (vitamin B-1) 250 MG tablet   Yes No   Sig: Take 250 mg by mouth daily   Patient not taking: Reported on 5/8/2025   phenazopyridine (PYRIDIUM) 200 mg tablet   No No   Sig: Take 1 tablet (200 mg total) by mouth 3 (three) times a day with meals   Patient not taking: Reported on 5/22/2025   triamcinolone (KENALOG) 0.1 % ointment   No No   Sig: Apply topically 2 (two) times a day Apply two times a week      Facility-Administered Medications: None     Discharge Medication List as of 6/30/2025  3:04 PM        START taking these medications    Details   cephalexin (KEFLEX) 500 mg capsule Take 1 capsule (500 mg total) by mouth every 6 (six) hours for 7 days, Starting Mon 6/30/2025, Until Mon 7/7/2025, Normal      !! phenazopyridine (PYRIDIUM) 200 mg tablet Take 1 tablet (200 mg total) by mouth 3 (three) times a day as needed for bladder spasms, Starting Mon 6/30/2025, Normal       !! - Potential duplicate medications found. Please discuss with provider.        CONTINUE these medications which have NOT CHANGED    Details   Apalutamide (Erleada) 60 MG TABS Take 4 tablets by mouth daily (240 mg total), Starting Mon 3/24/2025, Normal      Relugolix (Orgovyx) 120 MG TABS Take 2 tablets by mouth daily (240 mg total), Starting Tue 5/27/2025, Normal      !! phenazopyridine (PYRIDIUM) 200 mg tablet Take 1 tablet (200 mg total) by mouth 3 (three) times a day with meals, Starting Thu 5/8/2025, Normal      Thiamine HCl (vitamin B-1) 250 MG tablet Take 250  mg by mouth daily, Historical Med      triamcinolone (KENALOG) 0.1 % ointment Apply topically 2 (two) times a day Apply two times a week, Starting Thu 4/17/2025, Normal       !! - Potential duplicate medications found. Please discuss with provider.        No discharge procedures on file.  ED SEPSIS DOCUMENTATION   Time reflects when diagnosis was documented in both MDM as applicable and the Disposition within this note       Time User Action Codes Description Comment    6/30/2025  3:03 PM Syed Akers [R10.9] Flank pain     6/30/2025  3:03 PM Syed Akers [N30.90] Cystitis                    Syed Akers, DO  06/30/25 8066         [1]   Past Medical History:  Diagnosis Date    Bladder cancer (HCC)     Cancer (HCC)     Chronic pain disorder     lower back    Colon polyp     Fatty liver     Hemorrhoids     History of transfusion 2014    Kidney stone     on PET scan    Prostate cancer (HCC)    [2]   Past Surgical History:  Procedure Laterality Date    COLONOSCOPY      HEMORROIDECTOMY      IL CYSTO W/REMOVAL OF LESIONS SMALL N/A 1/10/2025    Procedure: (TURBT);  Surgeon: Memo Falcon MD;  Location: [facility];  Service: Urology    IL CYSTOURETHROSCOPY WITH BIOPSY N/A 5/8/2025    Procedure: TURBT;  Surgeon: Memo Falcon MD;  Location: [facility];  Service: Urology   [3]   Family History  Problem Relation Name Age of Onset    Cancer Mother      Bone cancer Mother      Lung cancer Father      Breast cancer Daughter     [4]   Social History  Tobacco Use    Smoking status: Never     Passive exposure: Never    Smokeless tobacco: Never   Vaping Use    Vaping status: Never Used   Substance Use Topics    Alcohol use: Not Currently     Comment: has not had any alcohol in over 2 months    Drug use: Never        Syed Akers, DO  06/30/25 5288

## 2025-06-30 NOTE — ED NOTES
Lab notified that urine culture sticker was sent down for urine specimen already in lab.      Twila López RN  06/30/25 5971

## 2025-07-01 LAB — BACTERIA UR CULT: NORMAL

## 2025-07-07 DIAGNOSIS — C61 PRIMARY PROSTATE ADENOCARCINOMA (HCC): ICD-10-CM

## 2025-07-07 NOTE — TELEPHONE ENCOUNTER
Medication:  Apalutamide (Erleada) 60 MG TABS    Dose/Frequency: Take 4 tablets by mouth daily (240 mg total)     Quantity:  120 tablet     Pharmacy: [facility] [address]   Phone: [phone]  Fax: [phone]      Office:   [x] PCP/Provider - Cristofer Llamas MD   [] Speciality/Provider -     Does the patient have enough for 3 days?   [] Yes   [x] No - Send as HP to POD

## 2025-07-08 NOTE — ASSESSMENT & PLAN NOTE
Aman Vasquez is a 75 y.o. 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

## 2025-07-08 NOTE — PROGRESS NOTES
Name: Aman Vasquez      : 1950      MRN: [id number]  Encounter Provider: Cristofer Llamas MD  Encounter Date: 2025   Encounter department: [facility]  :  Assessment & Plan  Primary prostate adenocarcinoma (HCC)    Aman Vasquez is a 75 y.o. 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 Prostate cancer.  His PSA levels have shown a significant decrease from 0.17 in 2025 to 0.097 currently, indicating a 40% reduction. The goal is to further reduce the PSA level to <0.0008. The cancer is under excellent control and minimally active. The combination of Orgovyx and Erleada is effectively reducing testosterone levels, preventing the cancer cells from dividing uncontrollably. Orgovyx reduces testosterone levels, while Erleada blocks the cancer cells' ability to uptake the remaining testosterone in the body. This dual approach is inhibiting the growth of the cancer cells. On average, Erleada can control the cancer for about 2 years without resistance, but there are cases where it has been effective for 3 to 4 years. While the cancer cannot be cured, it can be managed effectively.    2. Back pain.  Reports new pain to the left of the back, described as feeling like a stone. Has a history of kidney stones and the pain was excruciating at times. Used a heating pad which helped significantly. Visited the emergency room where no kidney stone was found, but the pain had reduced by then. Advised to continue drinking plenty of water to help prevent kidney stones.        Sister Jovita is the emergency contact. He lives with his wife, who has dementia.     Original ECO    Today's ECO    Goals and Barriers:  Current  "Goal: Minimize effects of disease burden, extend life.   Barriers to accomplishing this: None    Patient's Capacity to Self Care:  Patient is able to self care        Aman Vasquez is a 75 y.o. 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  He was referred to urology who noted a firm nodule on the L side of the prostate on HERON, and repeat PSA was 32.1 (12/2/24).  MRI Prostate (11/26/24) showed PI-RADS 5 which showed a 5.2 cm lesion of the L peripheral zone with probable extra prostatic extension, enlarged R external iliac lymph node, no osseous metastatic disease, 1.2 cm R-sided bladder lesion concerning for urothelial carcinoma, and a prostate volume of 51 mL.  A prostate biopsy was performed (12/6/24) which showed adenocarcinoma of the prostate, [place] 4+4=8, Grade Group 4.  PET CT (12/31/24) confirmed MRI findings and showed avid taisha metastases in the lower abdomen and Right pelvis.    TURBT (1/10/25) showed \"High-grade papillary urothelial carcinoma with inverted growth pattern; cannot exclude focal superficial lamina propria invasion\"  . S/p intravesical instillation of gemcitabine.     Colonoscopy (12/18/24) removed a benign rectal polyp.  Punch biopsy (12/23/24) of mid back was benign.    Discussion of decision making  Oncology history updated, accordingly, during this visit  Goals of care/patient communication  I discussed with the patient the clinical course leading up to their cancer diagnosis. I reviewed relevant office notes, imaging reports and pathology result as well.  I told the patient that this is a case of likely incurable disease and what this means. We discussed that the goal of " "anti-cancer therapy is to provide best quality of life, extend overall survival, and progression free survival as shown in clinical trials. We also discussed that there might be a point when the cancer will no longer respond to this anti-neoplastic therapy. As a result, we also discussed the role of the palliative care team being introduced early in the treatment course. We will be making this referral.  I explained the risks/benefits of the proposed cancer therapy: Nubeqa + Docetaxel vs Erleada + ADT and after discussion including understanding risks of possible life-threatening complications and therapy-related malignancy development, informed consent has been signed and obtained for the Erleada + Orgovyx.        According to  [place] ClinicalTrials.gov number, [id number]   \"A total of 525 patients were assigned to receive apalutamide plus ADT and 527 to receive placebo plus ADT. The median age was 68 years. A total of 16.4% of the patients had undergone prostatectomy or received radiotherapy for localized disease, and 10.7% had received previous docetaxel therapy; 62.7% had high-volume disease, and 37.3% had low-volume disease. At the first interim analysis, with a median of 22.7 months of follow-up, the percentage of patients with radiographic progression-free survival at 24 months was 68.2% in the apalutamide group and 47.5% in the placebo group (hazard ratio for radiographic progression or death, 0.48; 95% confidence interval [CI], 0.39 to 0.60; P<0.001). Overall survival at 24 months was also greater with apalutamide than with placebo (82.4% in the apalutamide group vs. 73.5% in the placebo group; hazard ratio for death, 0.67; 95% CI, 0.51 to 0.89; P=0.005). The frequency of grade 3 or 4 adverse events was 42.2% in the apalutamide group and 40.8% in the placebo group; rash was more common in the apalutamide group.   Conclusions  In this trial involving patients with metastatic, castration-sensitive prostate " "cancer, overall survival and radiographic progression-free survival were significantly longer with the addition of apalutamide to ADT than with placebo plus ADT, and the side-effect profile did not differ substantially between the two groups.\"  Apalutamide for Metastatic, Castration-Sensitive Prostate Cancer  [place] Journal of Medicine           TNM/Staging At Diagnosis  Cancer Staging   Primary prostate adenocarcinoma (HCC)  Staging form: Prostate, AJCC 8th Edition  - Clinical: Stage IVB (cT2, cN1, cM1, PSA: 32, Grade Group: 4) - Signed by Cristofer Llamas MD on 1/17/2025  Prostate specific antigen (PSA) range: 20 or greater  Christine score: 8  Histologic grading system: 5 grade system    Disease Features/Tumor Markers/Genetics  Tumor Marker:  PSA 22.5 (7/31/24)  PSA 32.1 (12/2/24)  1/27/2025: 78  3/24/2025: 0.594  4/30/2025: 0.17  7/15/2025: 0.097  Notable Path Features: adenocarcinoma of the prostate, [place] 4+4=8, Grade Group 4  1/28/2025 Guardant NGS: TP53, SMAD4 mutations  CARIS NGS: SPOP and TP53 mutation, TMB low and MSI stable  Treatment: Erleada + Orgovyx   Other Supportive care:   Treatment Team Members  Surgeon: Dr. Falcon  Rad Onc: Dr. Jones  Palliative  Labs  5/10/24 Lipid panel wnl   12/2/24 BMP unremarkable ; CBC  (stable)   Diagnostics  11/26/2024 MRI Prostate: PI-RADS 5 which showed a 5.2 cm lesion of the L peripheral zone with probable extra prostatic extension, enlarged R external iliac lymph node, no osseous metastatic disease, 1.2 cm R-sided bladder lesion concerning for urothelial carcinoma, and a prostate volume of 51 mL.  12/6/2024: Prostate biopsy: adenocarcinoma of the prostate, [place] 4+4=8, Grade Group 4  12/31/2024 PET/CT PSMA: Prostatomegaly, multifocal areas of increased uptake in the R posteriolateral peripheral zone, avid taisha metastasis in the lower abdomen and R pelvis, no seminal vesicle or osseous involvement    Discussion of decision making    I personally " reviewed the following lab results, the image studies, pathology, other specialty/physicians consult notes and recommendations, and outside medical records. I had a lengthy discussion with the patient and shared the work-up findings. We discussed the diagnosis and management plan as below. I spent 41 minutes reviewing the records (labs, clinician notes, outside records, medical history, ordering medicine/tests/procedures, monitoring of anti-neoplastic toxicities, interpreting the imaging/labs previously done) and coordination of care as well as direct time with the patient today, of which greater than 50% of the time was spent in counseling and coordination of care with the patient/family.    Plan/Labs   Cont ADT: Orgovyx 240 mg daily, Erleada 240 mg daily (he has been on and off the keto-low sugar diet)  Next line is Xtandi  Follow up with Rad Oncology   Cont to check PSA, CBC, CMP q12 weeks as standing until 4/2026   referral to palliative care   F/u Rad Onc  DEXA due q2 years thereafter        Follow Up: q 12 weeks    All questions were answered to the patient's satisfaction during this encounter. The patient knows the contact information for our office and knows to reach out for any relevant concerns related to this encounter. They are to call for any temperature 100.4 or higher, new symptoms including but not restricted to shaking chills, decreased appetite, nausea, vomiting, diarrhea, increased fatigue, shortness of breath or chest pain, confusion, and not feeling the strength to come to the clinic. For all other listed problems and medical diagnosis in their chart - they are managed by PCP and/or other specialists, which the patient acknowledges. Thank you very much for your consultation and making us a part of this patient's care. We are continuing to follow closely with you. Please do not hesitate to reach out to me with any additional questions or concerns.      Cristofer Llamas MD  Hematology & Medical  Oncology Staff Physician             Disclaimer: This document was prepared using "[employer] Direct technology. If a word or phrase is confusing, or does not make sense, this is likely due to recognition error which was not discovered during this clinician's review. If you believe an error has occurred, please contact me through [employer] service line for iveth?cation.          DATA REVIEW:    Pathology Result:    Final Diagnosis   Date Value Ref Range Status   05/08/2025   Final    A. Urinary Bladder, Right lateral wall:  -Extensively denuded urothelial lined mucosa with  cant surface  urothelium showing mild cytological atypia  Background lamina propria with chronic inflammation,  dystrophic calcification , dilated & congested blood vessels and edema.  -Unremarkable fragment of detrusor muscle present  -No invasive carcinoma seen  Note  -Pankeratin stain MCK is used in evaluation and highlighted scant surface  urothelium Stromal cells are negative for staining. CK20 stain is negative  Appropriate positive and negative controls reacted appropriately     Denudation may sometimes reflect cases of CIS where tumor cells have shed as a result of discohesion. Clinical correlation is recommended.    B. Urinary Bladder, Right dome:  Extensively denuded urothelial lined mucosa   -Unremarkable fragment of detrusor muscle present  -No invasive carcinoma seen  Note  -Pankeratin stain MCK is used in evaluation and highlighted scant surface  urothelium . Stromal cells are negative for staining. CK20 stain is negative  Appropriate positive and negative controls reacted appropriately       Appropriate clinical follow up is recommended.      03/11/2025   Final    A. Skin, central upper back, punch biopsy:    Erosion with superficial epidermal neutrophils and superficial perivascular lymphocytic infiltrate with numerous neutrophils and few eosinophils (see note).    Note: In the appropriate clinical context, the histopathologic  findings are consistent with trauma/irritation and concurrent irritant contact dermatitis. A concurrent eczematous process/allergic contact dermatitis cannot be fully excluded. Clinical pathologic correlation is advised. Pathogenic microorganisms are not seen with PAS and Gram stains. CD3 and  immunostains were reviewed; findings diagnostic of hematologic malignancy or a lymphoproliferative disorder are not appreciated. Significant lymphocytic cytologic atypia is not seen.  Multiple levels examined. If the rash were to progress/persist despite therapy, additional sampling should be sought to exclude development of a more significant disease.         01/10/2025   Final    A. Bladder, Right Lateral Wall, Biopsy:  - High-grade papillary urothelial carcinoma with inverted growth pattern; cannot exclude focal superficial lamina propria invasion.  - Benign muscularis propria present.    B. Bladder, Deep Margin, Biopsy:  - Portions of muscularis propria with chronic inflammation and reactive changes.    C. 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  - Invasion not seen on H&E.  - Benign muscularis propria present.    D. Bladder, Right Dome, Biopsy:  - Rare detached atypical urothelial cells present. See Note.  - Abundant denuded urothelial mucosa with marked reactive changes     12/23/2024   Final    A. Skin, upper mid back, punch biopsy:    Ulceration, superficial small-vessel ectasia, and superficial dermal lymphocytic inflammatory infiltrate with scattered neutrophils (see note).    Note: The histopathologic findings are non-specific, though findings suggestive of trauma/irritation are present. Findings diagnostic of a neutrophilic dermatosis (e.g., pyoderma gangrenosum) are not appreciated (though the diagnosis cannot be fully excluded based upon these histopathologic findings alone). Clinical pathologic correlation is essential. Pathogenic microorganisms are not seen with PAS and  Gram stains. Multiple levels examined. If the rash were to progress/persist despite therapy, additional sampling should be sought to exclude development of a more significant disease.         12/18/2024   Final    A. Polyp, Colorectal, Rectal polyp-cold bx:  - Minute piece of polypoid colonic mucosa, no definitive hyperplastic or adenomatous changes seen       12/06/2024   Final    A. Prostate, L Lat Base, needle Biopsy:   - Adenocarcinoma , immunohistochemically compatible with Prostatic adenocarcinoma  -Christine grade  4 +3  = score  7  -Cribriform pattern: present  -Intraductal carcinoma: present  -Tumor involves one submitted core  -Tumor involves approximately 95% of  core biopsy  - Perineural invasion: Not identified      B. Prostate, L Base, needle Biopsy:   - Adenocarcinoma , immunohistochemically compatible with Prostatic adenocarcinoma  -[place] grade 4+ 4 = score  8  -Cribriform pattern: present  -Intraductal carcinoma: present  -Tumor involves one submitted core  -Tumor involves approximately 80% of  core biopsy  - Perineural invasion: Not identified  Note  A small component of grade 5 can not be ruled out     C. Prostate, L Lat Mid, needle Biopsy:   Adenocarcinoma , compatible with Prostatic adenocarcinoma  -Christine grade 4 + 3=  score  7  -Cribriform pattern: present  -Tumor involves one submitted core  -Tumor involves approximately 95% of  core biopsy  - Perineural invasion: Not identified      D. Prostate, L Mid, needle Biopsy:   - Adenocarcinoma , immunohistochemically compatible with Prostatic adenocarcinoma  -Christine grade 4 + 3=  score  7   Cribriform pattern: present  -Tumor involves one submitted core  -Tumor  discontinuously involves approximately 95% of  core biopsy  - Perineural invasion: present      E. Prostate, L Lat [place], needle Biopsy:   Adenocarcinoma , compatible with Prostatic adenocarcinoma  -Christine grade 4 + 3=  score  7  Cribriform pattern: present  -Tumor involves one submitted  core  -Tumor  discontinuously involves approximately 95% of  core biopsy  - Perineural invasion: present      F. Prostate, L [place], eedle Biopsy:   - Adenocarcinoma , immunohistochemically compatible with Prostatic adenocarcinoma  -[place] grade 4 + 3=  score  7   Cribriform pattern: present  -Tumor involves one submitted core  -Tumor  discontinuously involves approximately 90% of  core biopsy  - Perineural invasion: present  Note  A small component of grade 5 can not be ruled out       G. Prostate, R Lat Base, needle Biopsy:   Adenocarcinoma , compatible with Prostatic adenocarcinoma  -[place] grade 4 + 4 =  score  8  Cribriform pattern: present  -Tumor involves one submitted core  -Tumor  discontinuously involves approximately 90% of  core biopsy  - Perineural invasion: present      H. Prostate, R Base,  needle Biopsy:   - Adenocarcinoma , immunohistochemically compatible with Prostatic adenocarcinoma  -Christine grade 4 + 4 =  score  8  Cribriform pattern: present  -Tumor involves one submitted core  -Tumor  discontinuously involves approximately 90% of  core biopsy  - Perineural invasion: present      I. Prostate, R Lat Mid, needle Biopsy:   - Adenocarcinoma , immunohistochemically compatible with Prostatic adenocarcinoma  -[place] grade 4 + 4 =  score  8  Cribriform pattern: not identifed  -Tumor involves one submitted core  -Tumor  discontinuously involves approximately 90% of  core biopsy  - Perineural invasion: not identifed      J. Prostate, R Mid, needle Biopsy:   Adenocarcinoma , compatible with Prostatic adenocarcinoma  -[place] grade 4 + 3=  score  7  -Cribriform Pattern: Not identified  -Tumor involves one submitted core  -Tumor involves approximately 30% of  core biopsy  - Perineural invasion: Not identified      K. Prostate, R Lat [place], needle Biopsy:   Adenocarcinoma , compatible with Prostatic adenocarcinoma  -Christine grade 4 + 3=  score  7  -Cribriform Pattern: Not identified  -Tumor involves one  submitted core  -Tumor discontinuously  involves approximately 90% of  core biopsy  - Perineural invasion: Not identified      L. Prostate, R [place], needle Biopsy:   Adenocarcinoma , compatible with Prostatic adenocarcinoma  -Christine grade 3 + 3=  score  6  Tumor involves one submitted core  -Tumor discontinuously  involves approximately 60% of  core biopsy  - Perineural invasion: Not identified    Note  Tumor cells are  positive for Prostatic marker (NKX3.1 ( diffuse) , PSAP ) and CDX2 ( patchy nuclear staining), P16 ( patchy cytoplasmic  staining) and negative for CK20  (colon marker) . Stains performed on multiple blocks Controls reacted appropriately     Based on this staining pattern and combined with high serum PSA, the tumor is consistent with Prostatic adenocarcinoma , however  because of positive staining for ( CDX2, P16)  extension from other organs including colorectal rectal carcinoma can not be  ruled out. Additional investigations including GI endoscopy  are recommend for definite diagnosis     12/03/2024   Final    A. Urine, Clean Catch, :  Suspicious for high grade urothelial carcinoma (SHGUC) - see comment.  Atypical but degenerative cells with nuclear enlargement, irregularities and hyperchromasia.    Comment:  The above diagnostic category is from the recently published book, The Rosalba System for Reporting Urinary Cytology, and is in keeping with the ongoing effort for utilization of standardized diagnostic terminology in urine cytology.*    *The Rosalba System for Reporting Urinary Cytology. Shelby Rosales, Viktoriya Heaton, Bishnu Lea; 2016.            Bladder: Surveillance as per Urology, currently appears to be [id number]      History of Present Illness   Chief Complaint   Patient presents with    Follow-up       History of Present Illness  The patient presents for prostate cancer and back pain.    He reports feeling well overall, with the exception of a new pain on the left side of  his back. This pain is reminiscent of a previous kidney stone he had, which was so severe that it rendered him bedridden. The current pain is sharp and intense, but its cause remains uncertain. He sought relief from a heating pad and even visited the emergency room. The pain has since subsided, leading him to question if it was indeed a kidney stone. Despite the pain, he has been maintaining a high water intake. He occasionally wakes up in the middle of the night due to the pain and uses a heating pad for relief. He also mentions a persistent lower back pain.    He reports no fevers, shaking, chills, nausea, vomiting, lightheadedness, headaches, blood in urine, dark or bright red stools, falls, rashes, itching, swelling in legs or arms, weakness on one side more than the other, double vision, new headaches, diarrhea, or constipation. He did notice extremely yellow urine for a few days, but this resolved after increasing his water intake.    He has been managing his prostate cancer with Orgovyx and Erleada, although he ran out of the latter and was without it for about a week. He has since resumed taking it consistently. He has been following a low sugar diet intermittently.      12/2/2024 PSA: 32    12/3/2024: Urine, Clean Catch, :  Suspicious for high grade urothelial carcinoma    12/6/2024 Prostate bx  G4+4 (8)    12/31/2024 PET/CT PSMA Prostatomegaly with multifocal areas of increased PSMA activity particularly in the right posterolateral peripheral zone in keeping with recent biopsy-proven prostate cancer. Radiotracer avid taisha metastases in the lower abdomen and right pelvis    1/10/2025: Bladder, Right Lateral Wall, Biopsy:  - High-grade papillary urothelial carcinoma with inverted growth pattern; cannot exclude focal superficial lamina propria invasion.  - Benign muscularis propria present    1/28/2025 Guardant NGS: TP53, SMAD4 mutations  CARIS NGS: SPOP and TP53 mutation, TMB low and MSI stable    See above  for updates  He has not had any issues with either of his prostate pills: no hot flashes, night sweats, mood swings.    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)  Staging form: Urinary Bladder, AJCC 8th Edition  - Pathologic: pT1, cN0, cM0 - Signed by Lisette Jones MD on 1/21/2025  Stage used in treatment planning: Yes  National guidelines used in treatment planning: Yes  Type of national guideline used in treatment planning: NCCN  Oncology History   Primary prostate adenocarcinoma (HCC)   1/17/2025 Initial Diagnosis    Primary prostate adenocarcinoma (HCC)     1/17/2025 -  Cancer Staged    Staging form: Prostate, AJCC 8th Edition  - Clinical: Stage IVB (cT2, cN1, cM1, PSA: 32, Grade Group: 4) - Signed by Cristofer Llamas MD on 1/17/2025  Prostate specific antigen (PSA) range: 20 or greater  Christine score: 8  Histologic grading system: 5 grade system       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   Denies any B symptoms and denies any urinary  "symptoms.   Worked in electricity and served in the  , in [place] with known exposure to asbestos as well as agent orange.     Objective   /70 (BP Location: Left arm, Patient Position: Sitting, Cuff Size: Adult)   Pulse 74   Temp (!) 97.3 °F (36.3 °C) (Skin)   Resp 16   Ht 6' 5\" (1.956 m)   Wt 117 kg (259 lb)   SpO2 98%   BMI 30.71 kg/m²     Pain Screening:  Pain Score: 0-No pain      General: Appearance: alert, cooperative, no distress.  HEENT: Normocephalic, atraumatic. No scleral icterus. conjunctivae clear. EOMI.  Chest: No tenderness to palpation. No open wound noted.  Lungs: Clear to auscultation bilaterally, Respirations unlabored.  Cardiac: Regular rate and rhythm, +S1and S2, -r/m/g  Abdomen: Soft, non-tender, non-distended. Bowel sounds are normal. No hepatosplenomegaly noted.  Extremities:  No edema, cyanosis, clubbing.  Skin: Skin color, turgor are normal. No rashes.  Lymphatics: no palpable supra-cervical, axillary, or inguinal adenopathy  Neurologic: Awake, Alert, and oriented, no gross focal deficits noted b/l.     Labs: I have reviewed the following labs:  Lab Results   Component Value Date/Time    WBC 6.02 07/15/2025 12:47 PM    RBC 3.98 07/15/2025 12:47 PM    Hemoglobin 12.6 07/15/2025 12:47 PM    Hematocrit 38.8 07/15/2025 12:47 PM    MCV 98 07/15/2025 12:47 PM    MCH 31.7 07/15/2025 12:47 PM    RDW 14.1 07/15/2025 12:47 PM    Platelets 153 07/15/2025 12:47 PM    Segmented % 66 07/15/2025 12:47 PM    Lymphocytes % 22 07/15/2025 12:47 PM    Monocytes % 8 07/15/2025 12:47 PM    Eosinophils Relative 3 07/15/2025 12:47 PM    Basophils Relative 0 07/15/2025 12:47 PM    Immature Grans % 1 07/15/2025 12:47 PM    Absolute Neutrophils 4.04 07/15/2025 12:47 PM     Lab Results   Component Value Date/Time    Potassium 4.9 07/15/2025 12:47 PM    Chloride 108 07/15/2025 12:47 PM    CO2 28 07/15/2025 12:47 PM    BUN 22 07/15/2025 12:47 PM    Creatinine 0.94 07/15/2025 12:47 PM    Glucose, " Fasting 99 04/30/2025 07:51 AM    Calcium 9.1 07/15/2025 12:47 PM    AST 16 07/15/2025 12:47 PM    ALT 16 07/15/2025 12:47 PM    Alkaline Phosphatase 69 07/15/2025 12:47 PM    Total Protein 7.0 07/15/2025 12:47 PM    Albumin 4.1 07/15/2025 12:47 PM    Total Bilirubin 0.40 07/15/2025 12:47 PM    eGFR 77 07/15/2025 12:47 PM

## 2025-07-14 ENCOUNTER — HOSPITAL ENCOUNTER (OUTPATIENT)
Dept: INFUSION CENTER | Facility: CLINIC | Age: 75
Discharge: HOME | End: 2025-07-14
Attending: UROLOGY
Payer: COMMERCIAL

## 2025-07-14 VITALS
HEART RATE: 57 BPM | DIASTOLIC BLOOD PRESSURE: 79 MMHG | TEMPERATURE: 97.4 F | SYSTOLIC BLOOD PRESSURE: 125 MMHG | RESPIRATION RATE: 20 BRPM

## 2025-07-14 DIAGNOSIS — C67.9 UROTHELIAL CARCINOMA OF BLADDER WITHOUT INVASION OF MUSCLE (HCC): Primary | ICD-10-CM

## 2025-07-14 PROCEDURE — 51720 TREATMENT OF BLADDER LESION: CPT

## 2025-07-14 RX ADMIN — DOCETAXEL 37.5 MG: 20 INJECTION, SOLUTION, CONCENTRATE INTRAVENOUS at 14:08

## 2025-07-14 RX ADMIN — GEMCITABINE 1000 MG: 38 INJECTION, SOLUTION INTRAVENOUS at 12:36

## 2025-07-14 NOTE — PROGRESS NOTES
Patient arrived to the unit reporting that he had a UTI and had completed his abx. He denies symptoms of an infection. Denies blood in the urine or burning on urination. Dr. Falcon was made aware and gave the ok to proceed with bladder chemotherapy. A 12 fr champagne catheter was placed by Minal TRINIDAD RN. He tolerated his chemotherapy and was able to hold his urine. Catheter was removed after his taxotere and patient is aware to turn and rotate. He has an alarm set for two hours to hold his urine. Patient is aware to return on 8/11/25.

## 2025-07-14 NOTE — PLAN OF CARE
Problem: INFECTION - ADULT  Goal: Absence or prevention of progression during hospitalization  Description: INTERVENTIONS:  - Assess and monitor for signs and symptoms of infection  - Monitor lab/diagnostic results  - Monitor all insertion sites, i.e. indwelling lines, tubes, and drains  - Monitor endotracheal if appropriate and nasal secretions for changes in amount and color  - [place] appropriate cooling/warming therapies per order  - Administer medications as ordered  - Instruct and encourage patient and family to use good hand hygiene technique  - Identify and instruct in appropriate isolation precautions for identified infection/condition  Outcome: Progressing

## 2025-07-15 ENCOUNTER — APPOINTMENT (OUTPATIENT)
Dept: LAB | Age: 75
End: 2025-07-15
Payer: COMMERCIAL

## 2025-07-15 DIAGNOSIS — C61 PRIMARY PROSTATE ADENOCARCINOMA (HCC): ICD-10-CM

## 2025-07-15 LAB
ALBUMIN SERPL BCG-MCNC: 4.1 G/DL (ref 3.5–5)
ALP SERPL-CCNC: 69 U/L (ref 34–104)
ALT SERPL W P-5'-P-CCNC: 16 U/L (ref 7–52)
ANION GAP SERPL CALCULATED.3IONS-SCNC: 7 MMOL/L (ref 4–13)
AST SERPL W P-5'-P-CCNC: 16 U/L (ref 13–39)
BASOPHILS # BLD AUTO: 0.01 THOUSANDS/ÂΜL (ref 0–0.1)
BASOPHILS NFR BLD AUTO: 0 % (ref 0–1)
BILIRUB SERPL-MCNC: 0.4 MG/DL (ref 0.2–1)
BUN SERPL-MCNC: 22 MG/DL (ref 5–25)
CALCIUM SERPL-MCNC: 9.1 MG/DL (ref 8.4–10.2)
CHLORIDE SERPL-SCNC: 108 MMOL/L (ref 96–108)
CO2 SERPL-SCNC: 28 MMOL/L (ref 21–32)
CREAT SERPL-MCNC: 0.94 MG/DL (ref 0.6–1.3)
EOSINOPHIL # BLD AUTO: 0.16 THOUSAND/ÂΜL (ref 0–0.61)
EOSINOPHIL NFR BLD AUTO: 3 % (ref 0–6)
ERYTHROCYTE [DISTWIDTH] IN BLOOD BY AUTOMATED COUNT: 14.1 % (ref 11.6–15.1)
GFR SERPL CREATININE-BSD FRML MDRD: 77 ML/MIN/1.73SQ M
GLUCOSE SERPL-MCNC: 66 MG/DL (ref 65–140)
HCT VFR BLD AUTO: 38.8 % (ref 36.5–49.3)
HGB BLD-MCNC: 12.6 G/DL (ref 12–17)
IMM GRANULOCYTES # BLD AUTO: 0.03 THOUSAND/UL (ref 0–0.2)
IMM GRANULOCYTES NFR BLD AUTO: 1 % (ref 0–2)
LYMPHOCYTES # BLD AUTO: 1.31 THOUSANDS/ÂΜL (ref 0.6–4.47)
LYMPHOCYTES NFR BLD AUTO: 22 % (ref 14–44)
MCH RBC QN AUTO: 31.7 PG (ref 26.8–34.3)
MCHC RBC AUTO-ENTMCNC: 32.5 G/DL (ref 31.4–37.4)
MCV RBC AUTO: 98 FL (ref 82–98)
MONOCYTES # BLD AUTO: 0.47 THOUSAND/ÂΜL (ref 0.17–1.22)
MONOCYTES NFR BLD AUTO: 8 % (ref 4–12)
NEUTROPHILS # BLD AUTO: 4.04 THOUSANDS/ÂΜL (ref 1.85–7.62)
NEUTS SEG NFR BLD AUTO: 66 % (ref 43–75)
NRBC BLD AUTO-RTO: 0 /100 WBCS
PLATELET # BLD AUTO: 153 THOUSANDS/UL (ref 149–390)
PMV BLD AUTO: 10.9 FL (ref 8.9–12.7)
POTASSIUM SERPL-SCNC: 4.9 MMOL/L (ref 3.5–5.3)
PROT SERPL-MCNC: 7 G/DL (ref 6.4–8.4)
PSA SERPL-MCNC: 0.1 NG/ML (ref 0–4)
RBC # BLD AUTO: 3.98 MILLION/UL (ref 3.88–5.62)
SODIUM SERPL-SCNC: 143 MMOL/L (ref 135–147)
WBC # BLD AUTO: 6.02 THOUSAND/UL (ref 4.31–10.16)

## 2025-07-15 PROCEDURE — 84153 ASSAY OF PSA TOTAL: CPT

## 2025-07-15 PROCEDURE — 85025 COMPLETE CBC W/AUTO DIFF WBC: CPT

## 2025-07-15 PROCEDURE — 80053 COMPREHEN METABOLIC PANEL: CPT

## 2025-07-15 PROCEDURE — 36415 COLL VENOUS BLD VENIPUNCTURE: CPT

## 2025-07-17 DIAGNOSIS — C61 PRIMARY PROSTATE ADENOCARCINOMA (HCC): ICD-10-CM

## 2025-07-18 ENCOUNTER — OFFICE VISIT (OUTPATIENT)
Dept: HEMATOLOGY ONCOLOGY | Facility: CLINIC | Age: 75
End: 2025-07-18
Payer: COMMERCIAL

## 2025-07-18 VITALS
WEIGHT: 259 LBS | DIASTOLIC BLOOD PRESSURE: 70 MMHG | OXYGEN SATURATION: 98 % | TEMPERATURE: 97.3 F | HEART RATE: 74 BPM | HEIGHT: 77 IN | BODY MASS INDEX: 30.58 KG/M2 | RESPIRATION RATE: 16 BRPM | SYSTOLIC BLOOD PRESSURE: 128 MMHG

## 2025-07-18 DIAGNOSIS — C61 PRIMARY PROSTATE ADENOCARCINOMA (HCC): Primary | ICD-10-CM

## 2025-07-18 PROCEDURE — 99215 OFFICE O/P EST HI 40 MIN: CPT | Performed by: INTERNAL MEDICINE

## 2025-08-05 ENCOUNTER — PROCEDURE VISIT (OUTPATIENT)
Dept: UROLOGY | Facility: MEDICAL CENTER | Age: 75
End: 2025-08-05
Payer: COMMERCIAL

## 2025-08-05 ENCOUNTER — TELEPHONE (OUTPATIENT)
Dept: UROLOGY | Facility: MEDICAL CENTER | Age: 75
End: 2025-08-05

## 2025-08-05 VITALS
RESPIRATION RATE: 21 BRPM | HEART RATE: 70 BPM | DIASTOLIC BLOOD PRESSURE: 64 MMHG | BODY MASS INDEX: 30.46 KG/M2 | WEIGHT: 258 LBS | OXYGEN SATURATION: 98 % | SYSTOLIC BLOOD PRESSURE: 110 MMHG | HEIGHT: 77 IN

## 2025-08-05 DIAGNOSIS — C67.9 UROTHELIAL CARCINOMA OF BLADDER WITHOUT INVASION OF MUSCLE (HCC): ICD-10-CM

## 2025-08-05 DIAGNOSIS — C61 PRIMARY PROSTATE ADENOCARCINOMA (HCC): ICD-10-CM

## 2025-08-05 DIAGNOSIS — C67.8 MALIGNANT NEOPLASM OF OVERLAPPING SITES OF BLADDER (HCC): Primary | ICD-10-CM

## 2025-08-05 PROCEDURE — 88112 CYTOPATH CELL ENHANCE TECH: CPT | Performed by: PATHOLOGY

## 2025-08-05 PROCEDURE — 52000 CYSTOURETHROSCOPY: CPT | Performed by: UROLOGY

## 2025-08-11 ENCOUNTER — HOSPITAL ENCOUNTER (OUTPATIENT)
Dept: INFUSION CENTER | Facility: CLINIC | Age: 75
End: 2025-08-11
Attending: UROLOGY

## 2025-08-11 ENCOUNTER — PREP FOR PROCEDURE (OUTPATIENT)
Dept: UROLOGY | Facility: MEDICAL CENTER | Age: 75
End: 2025-08-11

## (undated) DEVICE — HF-RESECTION ELECTRODE PLASMALOOP LOOP, MEDIUM, 24 FR., 12°-30°, ESG TURIS: Brand: OLYMPUS

## (undated) DEVICE — LUBRICANT JELLY SURGILUBE TUBE 2OZ FLIP TOP

## (undated) DEVICE — GLOVE SRG BIOGEL 7.5

## (undated) DEVICE — SCD SEQUENTIAL COMPRESSION COMFORT SLEEVE MEDIUM KNEE LENGTH: Brand: KENDALL SCD

## (undated) DEVICE — HF-RESECTION ELECTRODE PLASMALOOP LOOP, LARGE, 24 FR., 12°-30°, ESG TURIS: Brand: OLYMPUS

## (undated) DEVICE — BAG URINE DRAINAGE 2000ML ANTI RFLX LF

## (undated) DEVICE — SPECIMEN CONTAINER STERILE PEEL PACK

## (undated) DEVICE — INVIEW CLEAR LEGGINGS: Brand: CONVERTORS

## (undated) DEVICE — CATH FOLEY 20FR 5ML 2WAY LUBRICATH

## (undated) DEVICE — CATH FOLEY 20FR 5ML 2 WAY UNCOATED SILICONE

## (undated) DEVICE — UROLOGIC DRAIN BAG: Brand: UNBRANDED

## (undated) DEVICE — SINGLE PORT MANIFOLD: Brand: NEPTUNE 2

## (undated) DEVICE — PACK TUR

## (undated) DEVICE — EXIDINE 4 PCT

## (undated) DEVICE — TUBING SUCTION 5MM X 12 FT

## (undated) DEVICE — PREMIUM DRY TRAY LF: Brand: MEDLINE INDUSTRIES, INC.

## (undated) DEVICE — TELFA NON-ADHERENT ABSORBENT DRESSING: Brand: TELFA